# Patient Record
Sex: MALE | Race: BLACK OR AFRICAN AMERICAN | NOT HISPANIC OR LATINO | Employment: PART TIME | URBAN - METROPOLITAN AREA
[De-identification: names, ages, dates, MRNs, and addresses within clinical notes are randomized per-mention and may not be internally consistent; named-entity substitution may affect disease eponyms.]

---

## 2017-01-17 ENCOUNTER — TELEPHONE (OUTPATIENT)
Dept: FAMILY MEDICINE | Facility: CLINIC | Age: 37
End: 2017-01-17

## 2017-01-17 NOTE — TELEPHONE ENCOUNTER
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 He would like to make sure this was entered in and that his visit was billed to the workers comp and not to his health insurance. Workers comp info is not entered in on the registration. Please call patient to verify that workers comp info is in his chart.     Phone Number Patient can be reached at: Home number on file [phone] (home)    Best Time: Anytime    Can we leave a detailed message on this number? YES    Call taken on 1/17/2017 at 3:01 PM by Faby Card

## 2017-01-18 NOTE — TELEPHONE ENCOUNTER
Spoke with patient and informed of provider note below.  Patient was upset that no one has called to schedule PT.  Transferred call to scheduling, see referral.

## 2017-01-18 NOTE — TELEPHONE ENCOUNTER
To clarify, I saw patient on 12-26-16, and it was recorded as work comp.  The visit was linked to the work comp episode from 10-11-16.   We also did the usual workability letter.  Please inform patient.    Robbie Gaytan MD

## 2017-07-12 ENCOUNTER — OFFICE VISIT (OUTPATIENT)
Dept: URGENT CARE | Facility: URGENT CARE | Age: 37
End: 2017-07-12
Payer: MEDICAID

## 2017-07-12 VITALS
HEART RATE: 92 BPM | SYSTOLIC BLOOD PRESSURE: 110 MMHG | DIASTOLIC BLOOD PRESSURE: 80 MMHG | BODY MASS INDEX: 35.91 KG/M2 | TEMPERATURE: 98.4 F | WEIGHT: 205 LBS | OXYGEN SATURATION: 99 %

## 2017-07-12 DIAGNOSIS — R31.9 HEMATURIA: Primary | ICD-10-CM

## 2017-07-12 DIAGNOSIS — N41.0 ACUTE PROSTATITIS: ICD-10-CM

## 2017-07-12 DIAGNOSIS — R30.0 DYSURIA: ICD-10-CM

## 2017-07-12 LAB
ALBUMIN UR-MCNC: NEGATIVE MG/DL
APPEARANCE UR: ABNORMAL
BACTERIA #/AREA URNS HPF: ABNORMAL /HPF
BILIRUB UR QL STRIP: NEGATIVE
COLOR UR AUTO: ABNORMAL
GLUCOSE UR STRIP-MCNC: NEGATIVE MG/DL
HGB UR QL STRIP: ABNORMAL
KETONES UR STRIP-MCNC: NEGATIVE MG/DL
LEUKOCYTE ESTERASE UR QL STRIP: NEGATIVE
NITRATE UR QL: NEGATIVE
PH UR STRIP: 5 PH (ref 5–7)
RBC #/AREA URNS AUTO: >100 /HPF (ref 0–2)
SP GR UR STRIP: 1.02 (ref 1–1.03)
URN SPEC COLLECT METH UR: ABNORMAL
UROBILINOGEN UR STRIP-ACNC: 0.2 EU/DL (ref 0.2–1)
WBC #/AREA URNS AUTO: ABNORMAL /HPF (ref 0–2)

## 2017-07-12 PROCEDURE — 87086 URINE CULTURE/COLONY COUNT: CPT | Performed by: FAMILY MEDICINE

## 2017-07-12 PROCEDURE — 87591 N.GONORRHOEAE DNA AMP PROB: CPT | Performed by: FAMILY MEDICINE

## 2017-07-12 PROCEDURE — 99213 OFFICE O/P EST LOW 20 MIN: CPT | Performed by: FAMILY MEDICINE

## 2017-07-12 PROCEDURE — 81001 URINALYSIS AUTO W/SCOPE: CPT | Performed by: PHYSICIAN ASSISTANT

## 2017-07-12 PROCEDURE — 87491 CHLMYD TRACH DNA AMP PROBE: CPT | Performed by: FAMILY MEDICINE

## 2017-07-12 RX ORDER — CIPROFLOXACIN 500 MG/1
500 TABLET, FILM COATED ORAL 2 TIMES DAILY
Qty: 20 TABLET | Refills: 0 | Status: SHIPPED | OUTPATIENT
Start: 2017-07-12 | End: 2017-07-22

## 2017-07-12 NOTE — MR AVS SNAPSHOT
"              After Visit Summary   2017    Isacc Wang    MRN: [id number]           Patient Information     Date Of Birth          1980        Visit Information        Provider Department      2017 8:30 PM Nidhi Pizano MD [facility]        Today's Diagnoses     Hematuria    -  1    Dysuria        Acute prostatitis           Follow-ups after your visit        Who to contact     If you have questions or need follow up information about today's clinic visit or your schedule please contact [facility] directly at [phone].  Normal or non-critical lab and imaging results will be communicated to you by [employer], letter or phone within 4 business days after the clinic has received the results. If you do not hear from us within 7 days, please contact the clinic through [employer] or phone. If you have a critical or abnormal lab result, we will notify you by phone as soon as possible.  Submit refill requests through [facility] or call your pharmacy and they will forward the refill request to us. Please allow 3 business days for your refill to be completed.          Additional Information About Your Visit        MyChart Information     [facility] lets you send messages to your doctor, view your test results, renew your prescriptions, schedule appointments and more. To sign up, go to [email or web address] . Click on \"Log in\" on the left side of the screen, which will take you to the Welcome page. Then click on \"Sign up Now\" on the right side of the page.     You will be asked to enter the access code listed below, as well as some personal information. Please follow the directions to create your username and password.     Your access code is: [email or web address]  Expires: 10/10/2017  9:17 PM     Your access code will  in 90 days. If you need help or a new code, please call your [facility] clinic or [phone].        Care EveryWhere ID     This is your " Care EveryWhere ID. This could be used by other organizations to access your [place] medical records  [id number]        Your Vitals Were     Pulse Temperature Pulse Oximetry BMI (Body Mass Index)          92 98.4  F (36.9  C) (Oral) 99% 35.91 kg/m2         Blood Pressure from Last 3 Encounters:   07/12/17 110/80   12/26/16 130/82   12/13/16 127/83    Weight from Last 3 Encounters:   07/12/17 205 lb (93 kg)   12/26/16 204 lb (92.5 kg)   12/13/16 209 lb (94.8 kg)              We Performed the Following     Chlamydia trachomatis PCR     Neisseria gonorrhoeae PCR     UA with Microscopic reflex to Culture     Urine Culture Aerobic Bacterial          Today's Medication Changes          These changes are accurate as of: 7/12/17  9:17 PM.  If you have any questions, ask your nurse or doctor.               Start taking these medicines.        Dose/Directions    ciprofloxacin 500 MG tablet   Commonly known as:  CIPRO   Used for:  Acute prostatitis   Started by:  Nidhi Pizano MD        Dose:  500 mg   Take 1 tablet (500 mg) by mouth 2 times daily for 10 days   Quantity:  20 tablet   Refills:  0            Where to get your medicines      These medications were sent to [facility] [address] [place], [place] [address] [address] [facility] [address] [facility] [address]    Hours:  24-hours Phone:  [phone]     ciprofloxacin 500 MG tablet                Primary Care Provider Office Phone # Fax #    Ernesto Simental -[phone]       [facility] [address] [facility] [address]        Equal Access to Services     RAIZA CARDONA AH: Hadii curtis tsai hadashkandy Soomaali, waaxda luqadaha, qaybta kaalmada adeegyada, lukas karimi. So [facility] [phone].    ATENCIÓN: Si habla español, tiene a lopez disposición servicios gratuitos de asistencia lingüística. Sandra newell [phone].    We comply with applicable federal civil rights laws  and [place] laws. We do not discriminate on the basis of race, color, national origin, age, disability sex, sexual orientation or gender identity.            Thank you!     Thank you for choosing [facility]  for your care. Our goal is always to provide you with excellent care. Hearing back from our patients is one way we can continue to improve our services. Please take a few minutes to complete the written survey that you may receive in the mail after your visit with us. Thank you!             Your Updated Medication List - Protect others around you: Learn how to safely use, store and throw away your medicines at www.disposemymeds.org.          This list is accurate as of: 7/12/17  9:17 PM.  Always use your most recent med list.                   Brand Name Dispense Instructions for use Diagnosis    ciprofloxacin 500 MG tablet    CIPRO    20 tablet    Take 1 tablet (500 mg) by mouth 2 times daily for 10 days    Acute prostatitis       ibuprofen 800 MG tablet    ADVIL/MOTRIN    90 tablet    Take 1 tablet (800 mg) by mouth every 8 hours as needed for moderate pain    Chronic pain of left ankle

## 2017-07-13 NOTE — PROGRESS NOTES
SUBJECTIVE:   Isacc Wang is a 36 year old male who  presents today for a possible UTI. Symptoms of dysuria and blood in urine noticed it an hr back then had another episode in the clinic today with blood noted in the end of urination    Hematuria yes . He was at work when had an urge to urinate  and had some burning with urination and noticed some blood at the end  of urination  sudden onsetand moderate.  There is no history of fever, chills, nausea or vomiting.  Has  history of clear  penile discharge. This patient does not have a history of urinary tract infections. Patient denies rigors, flank pain and temperature > 101 degrees F.    Past Medical History:   Diagnosis Date     Esophageal reflux      Hyperlipidemia LDL goal <130 10/9/2009     Obesity 10/21/2009     Current Outpatient Prescriptions   Medication Sig Dispense Refill     ibuprofen (ADVIL/MOTRIN) 800 MG tablet Take 1 tablet (800 mg) by mouth every 8 hours as needed for moderate pain 90 tablet 1     Social History   Substance Use Topics     Smoking status: Never Smoker     Smokeless tobacco: Never Used     Alcohol use No       ROS:   Review of systems negative except as stated above.    OBJECTIVE:  /80 (BP Location: Left arm, Patient Position: Chair, Cuff Size: Adult Regular)  Pulse 92  Temp 98.4  F (36.9  C) (Oral)  Wt 205 lb (93 kg)  SpO2 99%  BMI 35.91 kg/m2  GENERAL APPEARANCE: healthy, alert and no distress  RESP: lungs clear to auscultation - no rales, rhonchi or wheezes  CV: regular rates and rhythm, normal S1 S2, no murmur noted  ABDOMEN:  soft, nontender, no HSM or masses and bowel sounds normal  BACK: No CVA tenderness  SKIN: no suspicious lesions or rashes    ASSESSMENT:   Isacc was seen today for urinary problem.    Diagnoses and all orders for this visit:    Hematuria  -     UA with Microscopic reflex to Culture    Dysuria  -     Chlamydia trachomatis PCR  -     Neisseria gonorrhoeae PCR  -     Urine Culture Aerobic  Bacterial    Acute prostatitis  -     ciprofloxacin (CIPRO) 500 MG tablet; Take 1 tablet (500 mg) by mouth 2 times daily for 10 days      Results for orders placed or performed in visit on 07/12/17   UA with Microscopic reflex to Culture   Result Value Ref Range    Color Urine Pink     Appearance Urine Slightly Cloudy     Glucose Urine Negative NEG mg/dL    Bilirubin Urine Negative NEG    Ketones Urine Negative NEG mg/dL    Specific Gravity Urine 1.020 1.003 - 1.035    pH Urine 5.0 5.0 - 7.0 pH    Protein Albumin Urine Negative NEG mg/dL    Urobilinogen Urine 0.2 0.2 - 1.0 EU/dL    Nitrite Urine Negative NEG    Blood Urine Large (A) NEG    Leukocyte Esterase Urine Negative NEG    Source Midstream Urine     WBC Urine O - 2 0 - 2 /HPF    RBC Urine >100 (A) 0 - 2 /HPF    Bacteria Urine Few (A) NEG /HPF         PLAN:  As per ordered above  Drink plenty of fluids.  Prevention and treatment of UTI's discussed.Signs and symptoms of pyelonephritis mentioned.  Follow up with primary care physician if not improving  Follow up if  symptoms fail to improve or worsens   Pt understood and agreed with plan

## 2017-07-13 NOTE — NURSING NOTE
"Chief Complaint   Patient presents with     Urinary Problem     had episode of hematuria tonight       Initial /80 (BP Location: Left arm, Patient Position: Chair, Cuff Size: Adult Regular)  Pulse 92  Temp 98.4  F (36.9  C) (Oral)  Wt 205 lb (93 kg)  SpO2 99%  BMI 35.91 kg/m2 Estimated body mass index is 35.91 kg/(m^2) as calculated from the following:    Height as of 12/26/16: 5' 3.35\" (1.609 m).    Weight as of this encounter: 205 lb (93 kg).  Medication Reconciliation: complete   Michoacano PETERS    "

## 2017-07-14 LAB
BACTERIA SPEC CULT: NORMAL
C TRACH DNA SPEC QL NAA+PROBE: NORMAL
MICRO REPORT STATUS: NORMAL
N GONORRHOEA DNA SPEC QL NAA+PROBE: NORMAL
SPECIMEN SOURCE: NORMAL

## 2017-09-29 ENCOUNTER — OFFICE VISIT (OUTPATIENT)
Dept: FAMILY MEDICINE | Facility: CLINIC | Age: 37
End: 2017-09-29
Payer: COMMERCIAL

## 2017-09-29 VITALS
TEMPERATURE: 97.3 F | WEIGHT: 210 LBS | HEIGHT: 65 IN | SYSTOLIC BLOOD PRESSURE: 121 MMHG | HEART RATE: 73 BPM | BODY MASS INDEX: 34.99 KG/M2 | OXYGEN SATURATION: 99 % | DIASTOLIC BLOOD PRESSURE: 90 MMHG

## 2017-09-29 DIAGNOSIS — Z80.0 FAMILY HISTORY OF COLON CANCER: ICD-10-CM

## 2017-09-29 DIAGNOSIS — Z12.11 SPECIAL SCREENING FOR MALIGNANT NEOPLASMS, COLON: Primary | ICD-10-CM

## 2017-09-29 DIAGNOSIS — R07.89 CHEST TIGHTNESS OR PRESSURE: ICD-10-CM

## 2017-09-29 DIAGNOSIS — N52.9 ERECTILE DYSFUNCTION, UNSPECIFIED ERECTILE DYSFUNCTION TYPE: ICD-10-CM

## 2017-09-29 DIAGNOSIS — Z13.1 SCREENING FOR DIABETES MELLITUS: ICD-10-CM

## 2017-09-29 DIAGNOSIS — Z80.42 FAMILY HISTORY OF PROSTATE CANCER: ICD-10-CM

## 2017-09-29 LAB
GLUCOSE SERPL-MCNC: 92 MG/DL (ref 70–99)
PSA SERPL-ACNC: 1.2 UG/L (ref 0–4)

## 2017-09-29 PROCEDURE — 82947 ASSAY GLUCOSE BLOOD QUANT: CPT | Performed by: FAMILY MEDICINE

## 2017-09-29 PROCEDURE — 93000 ELECTROCARDIOGRAM COMPLETE: CPT | Performed by: FAMILY MEDICINE

## 2017-09-29 PROCEDURE — 99395 PREV VISIT EST AGE 18-39: CPT | Performed by: FAMILY MEDICINE

## 2017-09-29 PROCEDURE — 36415 COLL VENOUS BLD VENIPUNCTURE: CPT | Performed by: FAMILY MEDICINE

## 2017-09-29 PROCEDURE — G0103 PSA SCREENING: HCPCS | Performed by: FAMILY MEDICINE

## 2017-09-29 RX ORDER — SILDENAFIL CITRATE 20 MG/1
TABLET ORAL
Qty: 20 TABLET | Refills: 0 | Status: SHIPPED | OUTPATIENT
Start: 2017-09-29 | End: 2017-09-29

## 2017-09-29 RX ORDER — SILDENAFIL CITRATE 20 MG/1
TABLET ORAL
Qty: 20 TABLET | Refills: 0 | Status: SHIPPED | OUTPATIENT
Start: 2017-09-29 | End: 2017-10-25

## 2017-09-29 NOTE — LETTER
[facility]   [address] [place]  [address]  [phone]                                   October 2, 2017    Isacc Wang  [address]  [facility] [address]        Dear Isacc,    Your psa and glucose were normal     Results for orders placed or performed in visit on 09/29/17   PSA, screen   Result Value Ref Range    PSA 1.20 0 - 4 ug/L   Glucose   Result Value Ref Range    Glucose 92 70 - 99 mg/dL       If you have any questions please call the clinic at [phone]    Sincerely,    Ernesto Simental MD  bmd

## 2017-09-29 NOTE — PROGRESS NOTES
SUBJECTIVE:   CC: Isacc Wang is an 37 year old male who presents for preventative health visit.     Physical   Annual:     Getting at least 3 servings of Calcium per day::  Yes    Bi-annual eye exam::  Yes    Dental care twice a year::  Yes    Sleep apnea or symptoms of sleep apnea::  None    Taking medications regularly::  Yes    Medication side effects::  Not applicable      Today's PHQ-2 Score:   PHQ-2 ( 1999 [employer]) 9/29/2017   Q1: Little interest or pleasure in doing things 0   Q2: Feeling down, depressed or hopeless 0   PHQ-2 Score 0   Q1: Little interest or pleasure in doing things Not at all   Q2: Feeling down, depressed or hopeless Not at all   PHQ-2 Score 0     Work construction   Detailing cars     Abuse: Current or Past(Physical, Sexual or Emotional)- No  Do you feel safe in your environment - Yes    Social History   Substance Use Topics     Smoking status: Never Smoker     Smokeless tobacco: Never Used     Alcohol use No     The patient does not drink >3 drinks per day nor >7 drinks per week.    Last PSA: No results found for: PSA    Reviewed orders with patient. Reviewed health maintenance and updated orders accordingly - Yes  BP Readings from Last 3 Encounters:   09/29/17 121/90   07/12/17 110/80   12/26/16 130/82    Wt Readings from Last 3 Encounters:   09/29/17 210 lb (95.3 kg)   07/12/17 205 lb (93 kg)   12/26/16 204 lb (92.5 kg)                  Patient Active Problem List   Diagnosis     Hyperlipidemia LDL goal <130     Obesity     Acute idiopathic gout of right foot     Family history of colon cancer     Past Surgical History:   Procedure Laterality Date     C NONSPECIFIC PROCEDURE      cyst removed right ear       Social History   Substance Use Topics     Smoking status: Never Smoker     Smokeless tobacco: Never Used     Alcohol use Yes      Comment: Socially     Family History   Problem Relation Age of Onset     Hypertension Mother      CANCER Mother      cervical cancer     Depression  "Mother      Thyroid Disease Mother      DIABETES Father      Hypertension Father      CEREBROVASCULAR DISEASE Father      Depression Father      HEART DISEASE Father      Lipids Father      Colon Cancer Father      Colon Cancer Maternal Grandmother      Colon Cancer Maternal Uncle          Current Outpatient Prescriptions   Medication Sig Dispense Refill     Naproxen Sodium (ALEVE PO) Take by mouth as needed for moderate pain       ibuprofen (ADVIL/MOTRIN) 800 MG tablet Take 1 tablet (800 mg) by mouth every 8 hours as needed for moderate pain (Patient not taking: Reported on 9/29/2017) 90 tablet 1     No Known Allergies        Reviewed and updated as needed this visit by clinical staff         Reviewed and updated as needed this visit by Provider            ROS:I: NEGATIVE for worrisome rashes, moles or lesions  C: NEGATIVE for fever, chills, change in weight    E: NEGATIVE for vision changes or irritation  ENT: NEGATIVE for ear, mouth and throat problems  R: NEGATIVE for significant cough or SOB  CV: NEGATIVE , palpitations or peripheral edema' ; patient states that something is sitting on his chest goes away in 5-10 minutes   He did not think it was that bad and he thinks he was overexerting himself   GI: NEGATIVE for nausea, abdominal pain, heartburn, or change in bowel habits   male: negative for dysuria, hematuria, decreased urinary stream, erectile dysfunction, urethral discharge  History of gross hematuria 2 months ago   He was given antibiotic   Went away   M: NEGATIVE for significant arthralgias or myalgia  N: NEGATIVE for weakness, dizziness or paresthesias  P: NEGATIVE for changes in mood or affect    OBJECTIVE:   /90  Pulse 73  Temp 97.3  F (36.3  C) (Oral)  Ht 5' 4.5\" (1.638 m)  Wt 210 lb (95.3 kg)  SpO2 99%  BMI 35.49 kg/m2    EXAM:  GENERAL: healthy, alert and no distress; overweight EYES: Eyes grossly normal to inspection, PERRL and conjunctivae and sclerae normal  HENT: ear canals and " "TM's normal, nose and mouth without ulcers or lesions  NECK: no adenopathy, no asymmetry, masses, or scars and thyroid normal to palpation  RESP: lungs clear to auscultation - no rales, rhonchi or wheezes  CV: regular rate and rhythm, normal S1 S2, no S3 or S4, no murmur, click or rub, no peripheral edema and peripheral pulses strong  ABDOMEN: soft, nontender, no hepatosplenomegaly, no masses and bowel sounds normal  MS: no gross musculoskeletal defects noted, no edema  SKIN: no suspicious lesions or rashes  NEURO: Normal strength and tone, mentation intact and speech normal  PSYCH: mentation appears normal, affect normal/bright    Rectal is normal prostate     ASSESSMENT/PLAN:       ICD-10-CM    1. Special screening for malignant neoplasms, colon Z12.11 GASTROENTEROLOGY ADULT REF PROCEDURE ONLY   2. Family history of colon cancer Z80.0 GASTROENTEROLOGY ADULT REF PROCEDURE ONLY   3. Family history of prostate cancer Z80.42 PSA, screen   4. Chest tightness or pressure R07.89 EKG 12-lead complete w/read - Clinics       COUNSELING:   Reviewed preventive health counseling, as reflected in patient instructions       Regular exercise       Healthy diet/nutrition           reports that he has never smoked. He has never used smokeless tobacco.      Estimated body mass index is 35.91 kg/(m^2) as calculated from the following:    Height as of 12/26/16: 5' 3.35\" (1.609 m).    Weight as of 7/12/17: 205 lb (93 kg).         Counseling Resources:  ATP IV Guidelines  Pooled Cohorts Equation Calculator  FRAX Risk Assessment  ICSI Preventive Guidelines  Dietary Guidelines for Americans, 2010  USDA's MyPlate  ASA Prophylaxis  Lung CA Screening    Ernesto Simental MD  [facility] for HPI/ROS submitted by the patient on 9/29/2017   PHQ-2 Score: 0    "

## 2017-09-29 NOTE — MR AVS SNAPSHOT
After Visit Summary   9/29/2017    Isacc Wang    MRN: [id number]           Patient Information     Date Of Birth          1980        Visit Information        Provider Department      9/29/2017 2:20 PM Ernesto Simental MD [facility]        Today's Diagnoses     Special screening for malignant neoplasms, colon    -  1    Family history of colon cancer        Family history of prostate cancer        Chest tightness or pressure        Erectile dysfunction, unspecified erectile dysfunction type          Care Instructions      Preventive Health Recommendations  Male Ages 26 - 39    Yearly exam:             See your health care provider every year in order to  o   Review health changes.   o   Discuss preventive care.    o   Review your medicines if your doctor has prescribed any.    You should be tested each year for STDs (sexually transmitted diseases), if you re at risk.     After age 35, talk to your provider about cholesterol testing. If you are at risk for heart disease, have your cholesterol tested at least every 5 years.     If you are at risk for diabetes, you should have a diabetes test (fasting glucose).  Shots: Get a flu shot each year. Get a tetanus shot every 10 years.     Nutrition:    Eat at least 5 servings of fruits and vegetables daily.     Eat whole-grain bread, whole-wheat pasta and brown rice instead of white grains and rice.     Talk to your provider about Calcium and Vitamin D.     Lifestyle    Exercise for at least 150 minutes a week (30 minutes a day, 5 days a week). This will help you control your weight and prevent disease.     Limit alcohol to one drink per day.     No smoking.     Wear sunscreen to prevent skin cancer.     See your dentist every six months for an exam and cleaning.             Follow-ups after your visit        Additional Services     GASTROENTEROLOGY ADULT REF PROCEDURE ONLY       Last Lab Result: No results found for:  "CR  Body mass index is 35.49 kg/(m^2).    Father diagnosed with colon cancer when 45 y/o      Patient will be contacted to schedule procedure.     Please be aware that coverage of these services is subject to the terms and limitations of your health insurance plan.  Call member services at your health plan with any benefit or coverage questions.  Any procedures must be performed at a [facility] facility OR coordinated by your clinic's referral office.    Please bring the following with you to your appointment:    (1) Any X-Rays, CTs or MRIs which have been performed.  Contact the facility where they were done to arrange for  prior to your scheduled appointment.    (2) List of current medications   (3) This referral request   (4) Any documents/labs given to you for this referral                  Who to contact     If you have questions or need follow up information about today's clinic visit or your schedule please contact [facility] directly at [phone].  Normal or non-critical lab and imaging results will be communicated to you by MyChart, letter or phone within 4 business days after the clinic has received the results. If you do not hear from us within 7 days, please contact the clinic through [employer] or phone. If you have a critical or abnormal lab result, we will notify you by phone as soon as possible.  Submit refill requests through [employer] or call your pharmacy and they will forward the refill request to us. Please allow 3 business days for your refill to be completed.          Additional Information About Your Visit        [employer] Information     [employer] lets you send messages to your doctor, view your test results, renew your prescriptions, schedule appointments and more. To sign up, go to [email or web address] . Click on \"Log in\" on the left side of the screen, which will take you to the Welcome page. Then click on \"Sign up Now\" on the right side of the page.     You will " "be asked to enter the access code listed below, as well as some personal information. Please follow the directions to create your username and password.     Your access code is: [email or web address]  Expires: 10/10/2017  9:17 PM     Your access code will  in 90 days. If you need help or a new code, please call your [facility] clinic or [phone].        Care EveryWhere ID     This is your Care EveryWhere ID. This could be used by other organizations to access your [facility] medical records  [id number]        Your Vitals Were     Pulse Temperature Height Pulse Oximetry BMI (Body Mass Index)       73 97.3  F (36.3  C) (Oral) 5' 4.5\" (1.638 m) 99% 35.49 kg/m2        Blood Pressure from Last 3 Encounters:   17 121/90   17 110/80   16 130/82    Weight from Last 3 Encounters:   17 210 lb (95.3 kg)   17 205 lb (93 kg)   16 204 lb (92.5 kg)              We Performed the Following     EKG 12-lead complete w/read - Clinics     GASTROENTEROLOGY ADULT REF PROCEDURE ONLY     PSA, screen          Today's Medication Changes          These changes are accurate as of: 17  3:44 PM.  If you have any questions, ask your nurse or doctor.               Start taking these medicines.        Dose/Directions    sildenafil 20 MG tablet   Commonly known as:  REVATIO   Used for:  Erectile dysfunction, unspecified erectile dysfunction type   Started by:  Ernesto Simental MD        Take 1-3 tabs as needed 1 hour before sexual activity for erectile dysfunction  Never use with nitroglycerin, terazosin or doxazosin.   Quantity:  20 tablet   Refills:  0            Where to get your medicines      Call your pharmacy to confirm that your medication is ready for pickup. It may take up to 24 hours for them to receive the prescription. If the prescription is not ready within 3 business days, please contact your clinic or your provider.     We will let you know when these medications are ready. If you don't hear " back within 3 business days, please contact us.     sildenafil 20 MG tablet                Primary Care Provider Office Phone # Fax #    Ernesto Simental [phone]       [address] [facility] [address]        Equal Access to Services     KYLIERAIZA DULCE : Hadii aad ku hadasho Soomaali, waaxda luqadaha, qaybta kaalmada adeegyada, waxay idiin hayefrainn adeeg khjennifer lashakajosue karimi. So [facility] [phone].    ATENCIÓN: Si habla español, tiene a lopez disposición servicios gratuitos de asistencia lingüística. Llame al [phone].    We comply with applicable federal civil rights laws and [place] laws. We do not discriminate on the basis of race, color, national origin, age, disability, sex, sexual orientation, or gender identity.            Thank you!     Thank you for choosing [facility]  for your care. Our goal is always to provide you with excellent care. Hearing back from our patients is one way we can continue to improve our services. Please take a few minutes to complete the written survey that you may receive in the mail after your visit with us. Thank you!             Your Updated Medication List - Protect others around you: Learn how to safely use, store and throw away your medicines at www.disposemymeds.org.          This list is accurate as of: 9/29/17  3:44 PM.  Always use your most recent med list.                   Brand Name Dispense Instructions for use Diagnosis    ALEVE PO      Take by mouth as needed for moderate pain        ibuprofen 800 MG tablet    ADVIL/MOTRIN    90 tablet    Take 1 tablet (800 mg) by mouth every 8 hours as needed for moderate pain    Chronic pain of left ankle       sildenafil 20 MG tablet    REVATIO    20 tablet    Take 1-3 tabs as needed 1 hour before sexual activity for erectile dysfunction  Never use with nitroglycerin, terazosin or doxazosin.    Erectile dysfunction, unspecified erectile dysfunction type

## 2017-10-09 ENCOUNTER — TELEPHONE (OUTPATIENT)
Dept: FAMILY MEDICINE | Facility: CLINIC | Age: 37
End: 2017-10-09

## 2017-10-09 NOTE — TELEPHONE ENCOUNTER
Reason for Call:  Other colonoscsopy prep script     Detailed comments: Patient calling in to state that his pharmacy, [facility], that they have not received the script for his colonoscopy prep.    Phone Number Patient can be reached at: Cell number on file:    Telephone Information:   Mobile [phone]       Best Time: anytime    Can we leave a detailed message on this number? YES    Call taken on 10/9/2017 at 3:57 PM by Angelika Sierra

## 2017-10-09 NOTE — TELEPHONE ENCOUNTER
Notified patient to get this prep from surgeon.  Patient stated understanding and agreeable with the plan of care. Talia Shay RN [facility] Triage.

## 2017-10-25 DIAGNOSIS — N52.9 ERECTILE DYSFUNCTION, UNSPECIFIED ERECTILE DYSFUNCTION TYPE: ICD-10-CM

## 2017-10-26 ENCOUNTER — HOSPITAL ENCOUNTER (OUTPATIENT)
Facility: AMBULATORY SURGERY CENTER | Age: 37
Discharge: HOME | End: 2017-10-26
Attending: SURGERY | Admitting: SURGERY
Payer: COMMERCIAL

## 2017-10-26 ENCOUNTER — SURGERY (OUTPATIENT)
Age: 37
End: 2017-10-26

## 2017-10-26 VITALS
WEIGHT: 210 LBS | OXYGEN SATURATION: 97 % | BODY MASS INDEX: 34.99 KG/M2 | TEMPERATURE: 97.5 F | RESPIRATION RATE: 16 BRPM | DIASTOLIC BLOOD PRESSURE: 81 MMHG | HEIGHT: 65 IN | SYSTOLIC BLOOD PRESSURE: 119 MMHG

## 2017-10-26 LAB — COLONOSCOPY: NORMAL

## 2017-10-26 PROCEDURE — G8918 PT W/O PREOP ORDER IV AB PRO: HCPCS

## 2017-10-26 PROCEDURE — 45385 COLONOSCOPY W/LESION REMOVAL: CPT

## 2017-10-26 PROCEDURE — 88305 TISSUE EXAM BY PATHOLOGIST: CPT | Performed by: SURGERY

## 2017-10-26 PROCEDURE — 99152 MOD SED SAME PHYS/QHP 5/>YRS: CPT | Performed by: SURGERY

## 2017-10-26 PROCEDURE — G8907 PT DOC NO EVENTS ON DISCHARG: HCPCS

## 2017-10-26 PROCEDURE — 45385 COLONOSCOPY W/LESION REMOVAL: CPT | Mod: PT | Performed by: SURGERY

## 2017-10-26 RX ORDER — FENTANYL CITRATE 50 UG/ML
INJECTION, SOLUTION INTRAMUSCULAR; INTRAVENOUS PRN
Status: DISCONTINUED | OUTPATIENT
Start: 2017-10-26 | End: 2017-10-26 | Stop reason: HOSPADM

## 2017-10-26 RX ORDER — LIDOCAINE 40 MG/G
CREAM TOPICAL
Status: DISCONTINUED | OUTPATIENT
Start: 2017-10-26 | End: 2017-10-27 | Stop reason: HOSPADM

## 2017-10-26 RX ADMIN — FENTANYL CITRATE 50 MCG: 50 INJECTION, SOLUTION INTRAMUSCULAR; INTRAVENOUS at 08:33

## 2017-10-26 RX ADMIN — FENTANYL CITRATE 100 MCG: 50 INJECTION, SOLUTION INTRAMUSCULAR; INTRAVENOUS at 08:30

## 2017-10-27 RX ORDER — SILDENAFIL CITRATE 20 MG/1
TABLET ORAL
Qty: 20 TABLET | Refills: 0 | Status: SHIPPED | OUTPATIENT
Start: 2017-10-27 | End: 2020-12-09

## 2017-10-27 NOTE — TELEPHONE ENCOUNTER
Prescription approved per [facility] Refill Protocol.    Hien Oconnell RN  [facility]

## 2017-11-02 LAB — COPATH REPORT: NORMAL

## 2019-02-17 ENCOUNTER — NURSE TRIAGE (OUTPATIENT)
Dept: NURSING | Facility: CLINIC | Age: 39
End: 2019-02-17

## 2019-02-17 ENCOUNTER — APPOINTMENT (OUTPATIENT)
Dept: GENERAL RADIOLOGY | Facility: CLINIC | Age: 39
End: 2019-02-17
Attending: EMERGENCY MEDICINE
Payer: COMMERCIAL

## 2019-02-17 ENCOUNTER — HOSPITAL ENCOUNTER (EMERGENCY)
Facility: CLINIC | Age: 39
Discharge: HOME | End: 2019-02-17
Attending: EMERGENCY MEDICINE | Admitting: EMERGENCY MEDICINE
Payer: COMMERCIAL

## 2019-02-17 VITALS
HEART RATE: 82 BPM | TEMPERATURE: 97.8 F | BODY MASS INDEX: 35.36 KG/M2 | HEIGHT: 66 IN | RESPIRATION RATE: 20 BRPM | SYSTOLIC BLOOD PRESSURE: 149 MMHG | DIASTOLIC BLOOD PRESSURE: 103 MMHG | OXYGEN SATURATION: 99 % | WEIGHT: 220 LBS

## 2019-02-17 DIAGNOSIS — M54.9 ACUTE BACK PAIN, UNSPECIFIED BACK LOCATION, UNSPECIFIED BACK PAIN LATERALITY: ICD-10-CM

## 2019-02-17 DIAGNOSIS — R07.89 ATYPICAL CHEST PAIN: ICD-10-CM

## 2019-02-17 LAB
ALBUMIN SERPL-MCNC: 3.4 G/DL (ref 3.4–5)
ALP SERPL-CCNC: 58 U/L (ref 40–150)
ALT SERPL W P-5'-P-CCNC: 59 U/L (ref 0–70)
ANION GAP SERPL CALCULATED.3IONS-SCNC: 5 MMOL/L (ref 3–14)
AST SERPL W P-5'-P-CCNC: 29 U/L (ref 0–45)
BASOPHILS # BLD AUTO: 0 10E9/L (ref 0–0.2)
BASOPHILS NFR BLD AUTO: 0.5 %
BILIRUB SERPL-MCNC: <0.1 MG/DL (ref 0.2–1.3)
BUN SERPL-MCNC: 15 MG/DL (ref 7–30)
CALCIUM SERPL-MCNC: 8.4 MG/DL (ref 8.5–10.1)
CHLORIDE SERPL-SCNC: 112 MMOL/L (ref 94–109)
CO2 SERPL-SCNC: 24 MMOL/L (ref 20–32)
CREAT SERPL-MCNC: 0.71 MG/DL (ref 0.66–1.25)
DIFFERENTIAL METHOD BLD: NORMAL
EOSINOPHIL # BLD AUTO: 0.1 10E9/L (ref 0–0.7)
EOSINOPHIL NFR BLD AUTO: 1.3 %
ERYTHROCYTE [DISTWIDTH] IN BLOOD BY AUTOMATED COUNT: 14.3 % (ref 10–15)
GFR SERPL CREATININE-BSD FRML MDRD: >90 ML/MIN/{1.73_M2}
GLUCOSE SERPL-MCNC: 87 MG/DL (ref 70–99)
HCT VFR BLD AUTO: 41 % (ref 40–53)
HGB BLD-MCNC: 13.5 G/DL (ref 13.3–17.7)
IMM GRANULOCYTES # BLD: 0 10E9/L (ref 0–0.4)
IMM GRANULOCYTES NFR BLD: 0.5 %
LYMPHOCYTES # BLD AUTO: 2.7 10E9/L (ref 0.8–5.3)
LYMPHOCYTES NFR BLD AUTO: 31.6 %
MCH RBC QN AUTO: 26.8 PG (ref 26.5–33)
MCHC RBC AUTO-ENTMCNC: 32.9 G/DL (ref 31.5–36.5)
MCV RBC AUTO: 81 FL (ref 78–100)
MONOCYTES # BLD AUTO: 0.9 10E9/L (ref 0–1.3)
MONOCYTES NFR BLD AUTO: 10.3 %
NEUTROPHILS # BLD AUTO: 4.8 10E9/L (ref 1.6–8.3)
NEUTROPHILS NFR BLD AUTO: 55.8 %
NRBC # BLD AUTO: 0 10*3/UL
NRBC BLD AUTO-RTO: 0 /100
PLATELET # BLD AUTO: 246 10E9/L (ref 150–450)
POTASSIUM SERPL-SCNC: 4 MMOL/L (ref 3.4–5.3)
PROT SERPL-MCNC: 7.7 G/DL (ref 6.8–8.8)
RBC # BLD AUTO: 5.04 10E12/L (ref 4.4–5.9)
SODIUM SERPL-SCNC: 141 MMOL/L (ref 133–144)
TROPONIN I SERPL-MCNC: <0.015 UG/L (ref 0–0.04)
WBC # BLD AUTO: 8.6 10E9/L (ref 4–11)

## 2019-02-17 PROCEDURE — 25000128 H RX IP 250 OP 636: Performed by: EMERGENCY MEDICINE

## 2019-02-17 PROCEDURE — 85025 COMPLETE CBC W/AUTO DIFF WBC: CPT | Performed by: EMERGENCY MEDICINE

## 2019-02-17 PROCEDURE — 99285 EMERGENCY DEPT VISIT HI MDM: CPT | Mod: 25

## 2019-02-17 PROCEDURE — 84484 ASSAY OF TROPONIN QUANT: CPT | Performed by: EMERGENCY MEDICINE

## 2019-02-17 PROCEDURE — 93005 ELECTROCARDIOGRAM TRACING: CPT

## 2019-02-17 PROCEDURE — 80053 COMPREHEN METABOLIC PANEL: CPT | Performed by: EMERGENCY MEDICINE

## 2019-02-17 PROCEDURE — 96374 THER/PROPH/DIAG INJ IV PUSH: CPT

## 2019-02-17 PROCEDURE — 25000132 ZZH RX MED GY IP 250 OP 250 PS 637: Performed by: EMERGENCY MEDICINE

## 2019-02-17 PROCEDURE — 71046 X-RAY EXAM CHEST 2 VIEWS: CPT

## 2019-02-17 RX ORDER — ASPIRIN 81 MG/1
324 TABLET, CHEWABLE ORAL ONCE
Status: COMPLETED | OUTPATIENT
Start: 2019-02-17 | End: 2019-02-17

## 2019-02-17 RX ORDER — KETOROLAC TROMETHAMINE 15 MG/ML
10 INJECTION, SOLUTION INTRAMUSCULAR; INTRAVENOUS ONCE
Status: COMPLETED | OUTPATIENT
Start: 2019-02-17 | End: 2019-02-17

## 2019-02-17 RX ORDER — CYCLOBENZAPRINE HCL 10 MG
10 TABLET ORAL 3 TIMES DAILY PRN
Qty: 20 TABLET | Refills: 0 | Status: SHIPPED | OUTPATIENT
Start: 2019-02-17 | End: 2019-05-21

## 2019-02-17 RX ORDER — DIAZEPAM 5 MG
5 TABLET ORAL ONCE
Status: COMPLETED | OUTPATIENT
Start: 2019-02-17 | End: 2019-02-17

## 2019-02-17 RX ADMIN — ASPIRIN 81 MG 324 MG: 81 TABLET ORAL at 22:47

## 2019-02-17 RX ADMIN — DIAZEPAM 5 MG: 5 TABLET ORAL at 22:46

## 2019-02-17 RX ADMIN — KETOROLAC TROMETHAMINE 10 MG: 15 INJECTION, SOLUTION INTRAMUSCULAR; INTRAVENOUS at 22:47

## 2019-02-17 ASSESSMENT — ENCOUNTER SYMPTOMS
SHORTNESS OF BREATH: 1
BACK PAIN: 1

## 2019-02-17 ASSESSMENT — MIFFLIN-ST. JEOR: SCORE: 1850.66

## 2019-02-17 NOTE — ED AVS SNAPSHOT
[facility]  [address]  [facility] [address]  Phone:  [phone]  Fax:  [phone]                                    Isacc Wang   MRN: [id number]    Department:  [facility]   Date of Visit:  2/17/2019           After Visit Summary Signature Page    I have received my discharge instructions, and my questions have been answered. I have discussed any challenges I see with this plan with the nurse or doctor.    ..........................................................................................................................................  Patient/Patient Representative Signature      ..........................................................................................................................................  Patient Representative Print Name and Relationship to Patient    ..................................................               ................................................  Date                                   Time    ..........................................................................................................................................  Reviewed by Signature/Title    ...................................................              ..............................................  Date                                               Time          22EPIC Rev 08/18

## 2019-02-18 LAB — INTERPRETATION ECG - MUSE: NORMAL

## 2019-02-18 NOTE — TELEPHONE ENCOUNTER
Patient concerned about chest pain.    Patient states she started having chest pain yesterday and it is worse today. Pain is about a 7/10 on pain scale. Pain is in the left side of her chest and travels from the front into his back and the pain feels heavy today on his chest, lasts longer than 5 minutes. Denied severe difficulty with breathing, shortness of breath at times.     Protocol and care advice reviewed.  Recommended patient hang up and call 911 per protocol, patient agreed he would call 911.   Caller states understanding of the recommended disposition.   Advised to call back if further questions or concerns.    Priscilla Duffy RN  [facility] Nurse Advisor    Reason for Disposition    [1] Chest pain lasts > 5 minutes AND [2] described as crushing, pressure-like, or heavy    Additional Information    Negative: Severe difficulty breathing (e.g., struggling for each breath, speaks in single words)    Negative: Difficult to awaken or acting confused (e.g., disoriented, slurred speech)    Negative: Shock suspected (e.g., cold/pale/clammy skin, too weak to stand, low BP, rapid pulse)    Negative: [1] Chest pain lasts > 5 minutes AND [2] history of heart disease  (i.e., heart attack, bypass surgery, angina, angioplasty, CHF; not just a heart murmur)    Protocols used: CHEST PAIN-ADULT-

## 2019-02-18 NOTE — ED TRIAGE NOTES
Pt presents with having chest pain that radiates from between his shoulder blades into the front of the chest rates it at a 6/10 ABC's intact A&Ox.4

## 2019-02-18 NOTE — ED PROVIDER NOTES
History     Chief Complaint:  Chest pain    The history is provided by the patient.      Isacc Wang is a 38 year old male with a history of hyperlipidemia who presents to the emergency department with his friend for evaluation of chest pain. For the past two days, the patient has had left sided back pain originating behind the shoulder blade and this pain wrapped to the front of his left side of his chest, which was concerning to him. Pain is worse in his back vs the chest and pain is sharp, non-pleuritic, brief, and sporadic in occurrence. He tried to massage his back but then the pain became sharp in the left side of the chest and did become short of breath. He does note he [place] bags at the airport [place] and may have injured his back then. The continuation of pain this evening prompted the patient to seek evaluation here in the emergency department. He has taken 400 mg ibuprofen 8 hours ago for the pain. He denies a history of related back pain but does have a history of back pain. He is not a smoker and has never previously had a stress test.     Cardiac/PE/DVT Risk Factors:  The patient has a history of hyperlipidemia but no hypertension, diabetes, or smoking. He reports a family history of heart disease in his father as well as HTN, hyperlipidemia, and diabetes. The patient denies any personal or familial history of PE, DVT, or clotting disorder. The patient reports denies recent travel, surgery, or other immobilizations.     Allergies:  NKDA     Medications:    Revatio  Prilosec    Past Medical History:    Hyperlipidemia  Obesity  Acute idiopathic gout of right foot   GERD    Past Surgical History:    Right ear cyst removed  Colonoscopy with CO2 insufflation    Family History:    HTN  Cervical cancer  Depression  Thyroid disease  diabetes mellitus  Cerebrovascular disease  Colon cancer  Hyperlipidemia  Heart disease: Father    Social History:  Negative for tobacco use.  Positive for  "alcohol use.  Negative for drug use.  Marital Status:  Single      Review of Systems   Respiratory: Positive for shortness of breath.    Cardiovascular: Positive for chest pain.   Musculoskeletal: Positive for back pain.   All other systems reviewed and are negative.    Physical Exam     Patient Vitals for the past 24 hrs:   BP Temp Temp src Pulse Heart Rate Resp SpO2 Height Weight   02/17/19 2230 -- -- -- -- 80 20 99 % -- --   02/17/19 2220 -- -- -- -- 77 22 100 % -- --   02/17/19 2215 -- -- -- -- 76 27 98 % -- --   02/17/19 2211 (!) 149/103 97.8  F (36.6  C) Oral 82 82 18 98 % 1.676 m (5' 6\") 99.8 kg (220 lb)       Physical Exam  Constitutional:  Oriented to person, place, and time. Well appearing.  HENT:   Head:    Normocephalic.   Mouth/Throat:   Oropharynx is clear and moist.   Eyes:    EOM are normal. Pupils are equal, round, and reactive to light.   Neck:    Neck supple.   Cardiovascular:  Normal rate, regular rhythm and normal heart sounds.      Exam reveals no gallop and no friction rub.       No murmur heard.  Pulmonary/Chest:  Effort normal and breath sounds normal.      No respiratory distress. No wheezes. No rales.      No reproducible chest wall pain.  Abdominal:   Soft. No distension. No tenderness. No rebound and no guarding.   Musculoskeletal:  Normal range of motion. No midline spinal tenderness.  Left thoracic paraspinal tenderness noted. Pain is reproducible with lateral torsion of his trunk. 2+ distal pulses.    Neurological:   Alert and oriented to person, place, and time.           Moves all 4 extremities spontaneously. 5/5 strength in all 4 extremities.  Sensation intact to light touch in all 4 extremities.  Skin:    No rash noted. No pallor.     Emergency Department Course   ECG:  Indication: chest pain  Time: 2204  Vent. Rate 77 bpm. AL interval 164. QRS duration 94. QT/QTc 370/418. P-R-T axis 37 -13 -2. Normal sinus rhythm Voltage criteria for left ventricular hypertrophy. Abnormal ECG. " Agrees with computer interpretation. Read time: 2210.    Imaging:  Radiographic findings were communicated with the patient who voiced understanding of the findings.    XR Chest 2 views:   No acute abnormality. As per radiology.     Laboratory:  2220 Troponin: <0.015  CBC: WBC: 8.6, HGB: 13.5, PLT: 246  CMP: Chloride 112 (H), Calcium 8.4 (L), Bilirubin total <0.1 (L) o/w WNL (Creatinine: 0.71)    Interventions:  2246 Valium 5 mg tablet PO  2247 Aspirin 324 mg tablet PO  2247 Toradol 10 mg IV    Emergency Department Course:  2208 Nursing notes and vitals reviewed.  I performed an exam of the patient as documented above.     IV inserted. Medicine administered as documented above. Blood drawn. This was sent to the lab for further testing, results above.    The patient was placed on continuous pulse oximetry and cardiac monitoring while here in the ED.      EKG obtained in the ED, see results above.     The patient was sent for a chest xray while in the emergency department, findings above.     2330 I rechecked the patient and discussed the results of his workup thus far.     Findings and plan explained to the Patient. Patient discharged home with instructions regarding supportive care, medications, and reasons to return. The importance of close follow-up was reviewed. The patient was prescribed Flexeril.    I personally reviewed the laboratory results with the Patient and answered all related questions prior to discharge.   Impression & Plan    Medical Decision Making:  Isacc Wang is a 38 year old male who has had ongoing back pain over the past two days after lifting a bag with pain radiating to the left front of his chest today. Differential includes musculoskeletal back pain, ACS, PE, pneumothorax, costochondritis, aortic dissection, or other causes. His workup is otherwise non-specific. EKG is reassuring as well a negative troponin with symptoms ongoing for greater than 6 hours. Patient is PERC negative and  low HEART score therefore I would highly doubt ACS and need for admission. Pain is reproducible along his left thoracic paraspinal area as well as with lateral rotation of his trunk, likely pointing to musculoskeletal back pain. He has no weakness or numbness. No concerning findings for neurovascular compromise or fracture or any need for imaging. At this time, he is safe for discharge. He will be discharged home with a course of flexeril and told to continue using ibuprofen and follow up with his primary care doctor is symptoms persist for physical therapy. If his symptoms start worsening such as chest pain, shortness of breath, or for any other concerns, he should return here.    Critical Care time:  none    Diagnosis:    ICD-10-CM    1. Acute back pain, unspecified back location, unspecified back pain laterality M54.9    2. Atypical chest pain R07.89        Disposition:  discharged to home    Discharge Medications:     Medication List      Started    cyclobenzaprine 10 MG tablet  Commonly known as:  FLEXERIL  10 mg, Oral, 3 TIMES DAILY PRN          Scribe Disclosure:  I, Sherley Barillas, am serving as a scribe on 2/17/2019 at 11:44 PM to personally document services performed by Federico Torres MD      providers found based on my observations and the provider's statements to me.     Sherley Barillas  2/17/2019   [facility] EMERGENCY DEPARTMENT       Federico Torres MD  02/18/19 [id number]

## 2019-02-18 NOTE — DISCHARGE INSTRUCTIONS
Discharge Instructions  Back Pain  You were seen today for back pain. Back pain can have many causes, but most will get better without surgery or other specific treatment. Sometimes there is a herniated (?slipped?) disc. We don?t usually do MRI scans to look for these right away, since most herniated discs will get better on their own with time.  Today, we did not find any evidence that your back pain was caused by a serious condition, such as an infection, fracture, or tumor. However, sometimes symptoms develop over time and cannot be found during an emergency visit, so it is very important that you follow up with your primary doctor.  Return to the Emergency Department if:  You develop a fever with your back pain.   You have weakness or change in sensation in one or both legs.  You lose control of your bowels or bladder, or can?t empty your bladder.  Your pain gets much worse.     Follow-up with your doctor:  Unless your pain has completely gone away, please make an appointment with your doctor within one week.  You may need further management of your back pain, such as more pain medication, imaging such as an X-ray or MRI, or physical therapy.    What can I do to help myself?  Remain Active -- People are often afraid that they will hurt their back further or delay recovery by remaining active, but this is one of the best things you can do for your back. In fact, prolonged bed rest is not recommended. Studies have shown that people with low back pain recover faster when they remain active. Movement helps to bring blood flow to the muscles and relieve muscle spasms as well as preventing loss of muscle strength.  Heat -- Using a heating pad can help with low back pain during the first few weeks. Do not sleep with a heating pad, as you can be burned.   Pain medications - You may take a pain medication such as Tylenol  (acetaminophen), Advil , Nuprin  (ibuprofen) or Aleve  (naproxen).  If you have been given a  narcotic such as Vicodin  (hydrocodone with acetaminophen), Percocet  (oxycodone with acetaminophen), codeine, or a muscle relaxant such as Flexeril  (cyclobenzaprine) or Soma  (carisoprodol), do not drive for four hours after you have taken it. If the narcotic contains Tylenol  (acetaminophen), do not take Tylenol  with it. All narcotics will cause constipation, so eat a high fiber diet.   If you were given a prescription for medicine here today, be sure to read all of the information (including the package insert) that comes with your prescription.  This will include important information about the medicine, its side effects, and any warnings that you need to know about.  The pharmacist who fills the prescription can provide more information and answer questions you may have about the medicine.  If you have questions or concerns that the pharmacist cannot address, please call or return to the Emergency Department.   Opioid Medication Information    Pain medications are among the most commonly prescribed medicines, so we are including this information for all our patients. If you did not receive pain medication or get a prescription for pain medicine, you can ignore it.     You may have been given a prescription for an opioid (narcotic) pain medicine and/or have received a pain medicine while here in the Emergency Department. These medicines can make you drowsy or impaired. You must not drive, operate dangerous equipment, or engage in any other dangerous activities while taking these medications. If you drive while taking these medications, you could be arrested for DUI, or driving under the influence. Do not drink any alcohol while you are taking these medications.     Opioid pain medications can cause addiction. If you have a history of chemical dependency of any type, you are at a higher risk of becoming addicted to pain medications.  Only take these prescribed medications to treat your pain when all other  options have been tried. Take it for as short a time and as few doses as possible. Store your pain pills in a secure place, as they are frequently stolen and provide a dangerous opportunity for children or visitors in your house to start abusing these powerful medications. We will not replace any lost or stolen medicine.  As soon as your pain is better, you should flush all your remaining medication.     Many prescription pain medications contain Tylenol  (acetaminophen), including Vicodin , Tylenol #3 , Norco , Lortab , and Percocet .  You should not take any extra pills of Tylenol  if you are using these prescription medications or you can get very sick.  Do not ever take more than 3000 mg of acetaminophen in any 24 hour period.    All opioids tend to cause constipation. Drink plenty of water and eat foods that have a lot of fiber, such as fruits, vegetables, prune juice, apple juice and high fiber cereal.  Take a laxative if you don?t move your bowels at least every other day. Miralax , Milk of Magnesia, Colace , or Senna  can be used to keep you regular.      Remember that you can always come back to the Emergency Department if you are not able to see your regular doctor in the amount of time listed above, if you get any new symptoms, or if there is anything that worries you.    Discharge Instructions  Chest Pain    You have been seen today for chest pain or discomfort.  At this time, your doctor has found no signs that your chest pain is due to a serious or life-threatening condition, (or you have declined more testing and/or admission to the hospital). However, sometimes there is a serious problem that does not show up right away. Your evaluation today may not be complete and you may need further testing and evaluation.     You need to follow-up with your regular doctor within 3 days.    Return to the Emergency Department if:  Your chest pain changes, gets worse, starts to happen more often, or comes with less  activity.  You are short of breath.  You get very weak or tired.  You pass out or faint.  You have any new symptoms, like fever, cough, numb legs, or you cough up blood.  You have anything else that worries you.    Until you follow-up with your regular doctor please do the following:  Take one aspirin daily unless you have an allergy or are told not to by your doctor.  If a stress test appointment has been made, go to the appointment.  If you have questions, contact your regular doctor.    If your doctor today has told you to follow-up with your regular doctor, it is very important that you make an appointment with your clinic and go to the appointment.  If you do not follow-up with your primary doctor, it may result in missing an important development which could result in permanent injury or disability and/or lasting pain.  If there is any problem keeping your appointment, call your doctor or return to the Emergency Department.    If you were given a prescription for medicine here today, be sure to read all of the information (including the package insert) that comes with your prescription.  This will include important information about the medicine, its side effects, and any warnings that you need to know about.  The pharmacist who fills the prescription can provide more information and answer questions you may have about the medicine.  If you have questions or concerns that the pharmacist cannot address, please call or return to the Emergency Department.     Opioid Medication Information    Pain medications are among the most commonly prescribed medicines, so we are including this information for all our patients. If you did not receive pain medication or get a prescription for pain medicine, you can ignore it.     You may have been given a prescription for an opioid (narcotic) pain medicine and/or have received a pain medicine while here in the Emergency Department. These medicines can make you drowsy or  impaired. You must not drive, operate dangerous equipment, or engage in any other dangerous activities while taking these medications. If you drive while taking these medications, you could be arrested for DUI, or driving under the influence. Do not drink any alcohol while you are taking these medications.     Opioid pain medications can cause addiction. If you have a history of chemical dependency of any type, you are at a higher risk of becoming addicted to pain medications.  Only take these prescribed medications to treat your pain when all other options have been tried. Take it for as short a time and as few doses as possible. Store your pain pills in a secure place, as they are frequently stolen and provide a dangerous opportunity for children or visitors in your house to start abusing these powerful medications. We will not replace any lost or stolen medicine.  As soon as your pain is better, you should flush all your remaining medication.     Many prescription pain medications contain Tylenol  (acetaminophen), including Vicodin , Tylenol #3 , Norco , Lortab , and Percocet .  You should not take any extra pills of Tylenol  if you are using these prescription medications or you can get very sick.  Do not ever take more than 3000 mg of acetaminophen in any 24 hour period.    All opioids tend to cause constipation. Drink plenty of water and eat foods that have a lot of fiber, such as fruits, vegetables, prune juice, apple juice and high fiber cereal.  Take a laxative if you don?t move your bowels at least every other day. Miralax , Milk of Magnesia, Colace , or Senna  can be used to keep you regular.      Remember that you can always come back to the Emergency Department if you are not able to see your regular doctor in the amount of time listed above, if you get any new symptoms, or if there is anything that worries you.

## 2019-05-21 ENCOUNTER — OFFICE VISIT (OUTPATIENT)
Dept: FAMILY MEDICINE | Facility: CLINIC | Age: 39
End: 2019-05-21
Payer: MEDICAID

## 2019-05-21 VITALS
HEART RATE: 72 BPM | TEMPERATURE: 97.3 F | WEIGHT: 234 LBS | SYSTOLIC BLOOD PRESSURE: 120 MMHG | HEIGHT: 66 IN | BODY MASS INDEX: 37.61 KG/M2 | OXYGEN SATURATION: 99 % | DIASTOLIC BLOOD PRESSURE: 84 MMHG

## 2019-05-21 DIAGNOSIS — R35.89 POLYURIA: ICD-10-CM

## 2019-05-21 DIAGNOSIS — R63.5 ABNORMAL WEIGHT GAIN: ICD-10-CM

## 2019-05-21 DIAGNOSIS — Z23 NEED FOR TDAP VACCINATION: Primary | ICD-10-CM

## 2019-05-21 DIAGNOSIS — Z86.0100 HISTORY OF COLONIC POLYPS: ICD-10-CM

## 2019-05-21 DIAGNOSIS — Z23 NEED FOR TETANUS BOOSTER: ICD-10-CM

## 2019-05-21 DIAGNOSIS — E66.01 MORBID OBESITY (H): ICD-10-CM

## 2019-05-21 DIAGNOSIS — Z12.5 SCREENING FOR PROSTATE CANCER: ICD-10-CM

## 2019-05-21 LAB
ANION GAP SERPL CALCULATED.3IONS-SCNC: 5 MMOL/L (ref 3–14)
BASOPHILS # BLD AUTO: 0 10E9/L (ref 0–0.2)
BASOPHILS NFR BLD AUTO: 0.4 %
BUN SERPL-MCNC: 11 MG/DL (ref 7–30)
CALCIUM SERPL-MCNC: 9.4 MG/DL (ref 8.5–10.1)
CHLORIDE SERPL-SCNC: 106 MMOL/L (ref 94–109)
CO2 SERPL-SCNC: 28 MMOL/L (ref 20–32)
CREAT SERPL-MCNC: 0.66 MG/DL (ref 0.66–1.25)
DIFFERENTIAL METHOD BLD: NORMAL
EOSINOPHIL # BLD AUTO: 0.1 10E9/L (ref 0–0.7)
EOSINOPHIL NFR BLD AUTO: 1.1 %
ERYTHROCYTE [DISTWIDTH] IN BLOOD BY AUTOMATED COUNT: 14.4 % (ref 10–15)
GFR SERPL CREATININE-BSD FRML MDRD: >90 ML/MIN/{1.73_M2}
GLUCOSE SERPL-MCNC: 85 MG/DL (ref 70–99)
HCT VFR BLD AUTO: 41.9 % (ref 40–53)
HGB BLD-MCNC: 14.2 G/DL (ref 13.3–17.7)
LYMPHOCYTES # BLD AUTO: 2.5 10E9/L (ref 0.8–5.3)
LYMPHOCYTES NFR BLD AUTO: 30.4 %
MCH RBC QN AUTO: 26.5 PG (ref 26.5–33)
MCHC RBC AUTO-ENTMCNC: 33.9 G/DL (ref 31.5–36.5)
MCV RBC AUTO: 78 FL (ref 78–100)
MONOCYTES # BLD AUTO: 0.7 10E9/L (ref 0–1.3)
MONOCYTES NFR BLD AUTO: 8.6 %
NEUTROPHILS # BLD AUTO: 4.9 10E9/L (ref 1.6–8.3)
NEUTROPHILS NFR BLD AUTO: 59.5 %
PLATELET # BLD AUTO: 274 10E9/L (ref 150–450)
POTASSIUM SERPL-SCNC: 4.4 MMOL/L (ref 3.4–5.3)
RBC # BLD AUTO: 5.35 10E12/L (ref 4.4–5.9)
SODIUM SERPL-SCNC: 139 MMOL/L (ref 133–144)
WBC # BLD AUTO: 8.2 10E9/L (ref 4–11)

## 2019-05-21 PROCEDURE — 84154 ASSAY OF PSA FREE: CPT | Performed by: FAMILY MEDICINE

## 2019-05-21 PROCEDURE — 90715 TDAP VACCINE 7 YRS/> IM: CPT | Performed by: FAMILY MEDICINE

## 2019-05-21 PROCEDURE — 99396 PREV VISIT EST AGE 40-64: CPT | Mod: 25 | Performed by: FAMILY MEDICINE

## 2019-05-21 PROCEDURE — 90471 IMMUNIZATION ADMIN: CPT | Performed by: FAMILY MEDICINE

## 2019-05-21 PROCEDURE — 85025 COMPLETE CBC W/AUTO DIFF WBC: CPT | Performed by: FAMILY MEDICINE

## 2019-05-21 PROCEDURE — 36415 COLL VENOUS BLD VENIPUNCTURE: CPT | Performed by: FAMILY MEDICINE

## 2019-05-21 PROCEDURE — 80048 BASIC METABOLIC PNL TOTAL CA: CPT | Performed by: FAMILY MEDICINE

## 2019-05-21 ASSESSMENT — ENCOUNTER SYMPTOMS
DIARRHEA: 0
DIZZINESS: 1
CHILLS: 0
ABDOMINAL PAIN: 0
HEMATURIA: 0
FEVER: 0
HEADACHES: 1
COUGH: 0
FREQUENCY: 0
CONSTIPATION: 0
EYE PAIN: 0
HEMATOCHEZIA: 0
NERVOUS/ANXIOUS: 0

## 2019-05-21 ASSESSMENT — MIFFLIN-ST. JEOR: SCORE: 1914.17

## 2019-05-21 NOTE — LETTER
[facility]   [address] [place]  [address]  [phone]                                   May 23, 2019    Isacc Wang  [address]   [facility] [id number]        Dear Isacc,    Your complete blood count is normal   Your psa is normal   Your basic metabolic panel which includes electrolytes,kidney function is normal and  -Glucose (diabetic screening test) is within normal limits    Follow up in 1 year(s)    Results for orders placed or performed in visit on 05/21/19   PSA, screen   Result Value Ref Range    PSA 0.97 0 - 4 ug/L   Basic metabolic panel   Result Value Ref Range    Sodium 139 133 - 144 mmol/L    Potassium 4.4 3.4 - 5.3 mmol/L    Chloride 106 94 - 109 mmol/L    Carbon Dioxide 28 20 - 32 mmol/L    Anion Gap 5 3 - 14 mmol/L    Glucose 85 70 - 99 mg/dL    Urea Nitrogen 11 7 - 30 mg/dL    Creatinine 0.66 0.66 - 1.25 mg/dL    GFR Estimate >90 >60 mL/min/[1.73_m2]    GFR Estimate If Black >90 >60 mL/min/[1.73_m2]    Calcium 9.4 8.5 - 10.1 mg/dL   CBC with platelets differential   Result Value Ref Range    WBC 8.2 4.0 - 11.0 10e9/L    RBC Count 5.35 4.4 - 5.9 10e12/L    Hemoglobin 14.2 13.3 - 17.7 g/dL    Hematocrit 41.9 40.0 - 53.0 %    MCV 78 78 - 100 fl    MCH 26.5 26.5 - 33.0 pg    MCHC 33.9 31.5 - 36.5 g/dL    RDW 14.4 10.0 - 15.0 %    Platelet Count 274 150 - 450 10e9/L    % Neutrophils 59.5 %    % Lymphocytes 30.4 %    % Monocytes 8.6 %    % Eosinophils 1.1 %    % Basophils 0.4 %    Absolute Neutrophil 4.9 1.6 - 8.3 10e9/L    Absolute Lymphocytes 2.5 0.8 - 5.3 10e9/L    Absolute Monocytes 0.7 0.0 - 1.3 10e9/L    Absolute Eosinophils 0.1 0.0 - 0.7 10e9/L    Absolute Basophils 0.0 0.0 - 0.2 10e9/L    Diff Method Automated Method        If you have any questions please call the clinic at [phone]    Sincerely,    Ernesto Simental MD  [facility]

## 2019-05-21 NOTE — PROGRESS NOTES
SUBJECTIVE:   CC: Isacc Wang is an 38 year old male who presents for preventative health visit.     Healthy Habits:     Getting at least 3 servings of Calcium per day:  Yes    Bi-annual eye exam:  Yes    Dental care twice a year:  Yes    Sleep apnea or symptoms of sleep apnea:  None    Diet:  Low salt    Frequency of exercise:  4-5 days/week    Duration of exercise:  30-45 minutes    Taking medications regularly:  Yes    Medication side effects:  None    PHQ-2 Total Score: 0    Additional concerns today:  No        Today's PHQ-2 Score:   PHQ-2 ( 1999 Pfizer) 5/21/2019   Q1: Little interest or pleasure in doing things 0   Q2: Feeling down, depressed or hopeless 0   PHQ-2 Score 0   Q1: Little interest or pleasure in doing things Not at all   Q2: Feeling down, depressed or hopeless Not at all   PHQ-2 Score 0       Abuse: Current or Past(Physical, Sexual or Emotional)- No  Do you feel safe in your environment? Yes    Social History     Tobacco Use     Smoking status: Never Smoker     Smokeless tobacco: Never Used   Substance Use Topics     Alcohol use: Yes     Comment: Socially     Alcohol Use 5/21/2019   Prescreen: >3 drinks/day or >7 drinks/week? No   Prescreen: >3 drinks/day or >7 drinks/week? -   No flowsheet data found.    Last PSA:   PSA   Date Value Ref Range Status   09/29/2017 1.20 0 - 4 ug/L Final     Comment:     Assay Method:  Chemiluminescence using Siemens Vista analyzer       Reviewed orders with patient. Reviewed health maintenance and updated orders accordingly - Yes  Labs reviewed in EPIC  BP Readings from Last 3 Encounters:   05/21/19 120/84   02/17/19 (!) 149/103   10/26/17 119/81    Wt Readings from Last 3 Encounters:   05/21/19 106.1 kg (234 lb)   02/17/19 99.8 kg (220 lb)   10/19/17 95.3 kg (210 lb)                  Patient Active Problem List   Diagnosis     Hyperlipidemia LDL goal <130     Obesity     Acute idiopathic gout of right foot     Family history of colon cancer     Obesity (BMI  35.0-39.9) with comorbidity (H)     Past Surgical History:   Procedure Laterality Date     C NONSPECIFIC PROCEDURE      cyst removed right ear     COLONOSCOPY WITH CO2 INSUFFLATION N/A 10/26/2017    Procedure: COLONOSCOPY WITH CO2 INSUFFLATION;  COLON SCREEN/ FAMILY HX/ SPECIAL SCREENING FOR MALIGNANT NEOPLASMS/ [id number];  Surgeon: Alfonso Ortega MD;  Location:  OR       Social History     Tobacco Use     Smoking status: Never Smoker     Smokeless tobacco: Never Used   Substance Use Topics     Alcohol use: Yes     Comment: Socially     Family History   Problem Relation Age of Onset     Hypertension Mother      Cancer Mother         cervical cancer     Depression Mother      Thyroid Disease Mother      Diabetes Father      Hypertension Father      Cerebrovascular Disease Father      Depression Father      Heart Disease Father      Lipids Father      Colon Cancer Father      Colon Cancer Maternal Grandmother      Colon Cancer Maternal Uncle          Current Outpatient Medications   Medication Sig Dispense Refill     ibuprofen (ADVIL/MOTRIN) 800 MG tablet Take 1 tablet (800 mg) by mouth every 8 hours as needed for moderate pain (Patient not taking: Reported on 5/21/2019) 90 tablet 1     sildenafil (REVATIO) 20 MG tablet TAKE 1 TO 3 TABLETS AS NEEDED 1 HOUR BEFORE SEXUAL ACTIVITY FOR ERECTILE DYSFUNCTION. NEVER USE WITH NITROGLYCERIN, TERAZOSIN, OR DOXAZOSIN (Patient not taking: Reported on 5/21/2019) 20 tablet 0     No Known Allergies    Reviewed and updated as needed this visit by clinical staff         Reviewed and updated as needed this visit by Provider        Past Medical History:   Diagnosis Date     Esophageal reflux      Hyperlipidemia LDL goal <130 10/9/2009     Obesity 10/21/2009      Past Surgical History:   Procedure Laterality Date     C NONSPECIFIC PROCEDURE      cyst removed right ear     COLONOSCOPY WITH CO2 INSUFFLATION N/A 10/26/2017    Procedure: COLONOSCOPY WITH CO2 INSUFFLATION;   "COLON SCREEN/ FAMILY HX/ SPECIAL SCREENING FOR MALIGNANT NEOPLASMS/ KAREN;  Surgeon: Alfonso Ortega MD;  Location: [facility] OR       Review of Systems   Constitutional: Negative for chills and fever.   HENT: Positive for ear pain. Negative for congestion.    Eyes: Negative for pain.   Respiratory: Negative for cough.    Cardiovascular: Negative for chest pain.   Gastrointestinal: Negative for abdominal pain, constipation, diarrhea and hematochezia.   Genitourinary: Negative for frequency, genital sores and hematuria.   Neurological: Positive for dizziness and headaches.   Psychiatric/Behavioral: The patient is not nervous/anxious.      History of gout       OBJECTIVE:   /84 (BP Location: Right arm, Patient Position: Sitting, Cuff Size: Adult Large)   Pulse 72   Temp 97.3  F (36.3  C) (Oral)   Ht 1.676 m (5' 6\")   Wt 106.1 kg (234 lb)   SpO2 99%   BMI 37.77 kg/m       Wt Readings from Last 4 Encounters:   05/21/19 106.1 kg (234 lb)   02/17/19 99.8 kg (220 lb)   10/19/17 95.3 kg (210 lb)   09/29/17 95.3 kg (210 lb)       Physical Exam  GENERAL: healthy, alert and no distress; obese has gained 20 lb in 2 years   EYES: Eyes grossly normal to inspection, PERRL and conjunctivae and sclerae normal  HENT: ear canals and TM's normal, nose and mouth without ulcers or lesions  NECK: no adenopathy, no asymmetry, masses, or scars and thyroid normal to palpation; no bruits   RESP: lungs clear to auscultation - no rales, rhonchi or wheezes  CV: regular rate and rhythm, normal S1 S2, no S3 or S4, no murmur, click or rub, no peripheral edema and peripheral pulses strong  ABDOMEN: soft, nontender, no hepatosplenomegaly, no masses and bowel sounds normal  MS: no gross musculoskeletal defects noted, no edema  SKIN: no suspicious lesions or rashes  NEURO: Normal strength and tone, mentation intact and speech normal  PSYCH: mentation appears normal, affect normal/bright    ASSESSMENT/PLAN:       ICD-10-CM    1. Need " "for Tdap vaccination Z23 TDAP, IM (10 - 64 YRS) - [facility]     VACCINE ADMINISTRATION, INITIAL   2. Need for tetanus booster Z23 TDAP, IM (10 - 64 YRS) - Adacel     VACCINE ADMINISTRATION, INITIAL   3. Morbid obesity (H) E66.01      Son is getting obese   Son has epilepsy     Patient thinks that he is stressed and eating more because of it   He is not    He lives with 2 sons     Suggested weighing     COUNSELING:   Reviewed preventive health counseling, as reflected in patient instructions       Regular exercise       Healthy diet/nutrition    Estimated body mass index is 35.51 kg/m  as calculated from the following:    Height as of 2/17/19: 1.676 m (5' 6\").    Weight as of 2/17/19: 99.8 kg (220 lb).     Weight management plan: Discussed healthy diet and exercise guidelines     reports that he has never smoked. He has never used smokeless tobacco.      Counseling Resources:  ATP IV Guidelines  Pooled Cohorts Equation Calculator  FRAX Risk Assessment  ICSI Preventive Guidelines  Dietary Guidelines for Americans, 2010  USDA's MyPlate  ASA Prophylaxis  Lung CA Screening    Ernesto Simental MD  [facility]  "

## 2019-05-21 NOTE — PATIENT INSTRUCTIONS
Preventive Health Recommendations  Male Ages 40 to 49    Yearly exam:             See your health care provider every year in order to  o   Review health changes.   o   Discuss preventive care.    o   Review your medicines if your doctor has prescribed any.    You should be tested each year for STDs (sexually transmitted diseases) if you re at risk.     Have a cholesterol test every 5 years.     Have a colonoscopy (test for colon cancer) if someone in your family has had colon cancer or polyps before age 50.     After age 45, have a diabetes test (fasting glucose). If you are at risk for diabetes, you should have this test every 3 years.      Talk with your health care provider about whether or not a prostate cancer screening test (PSA) is right for you.    Shots: Get a flu shot each year. Get a tetanus shot every 10 years.     Nutrition:    Eat at least 5 servings of fruits and vegetables daily.     Eat whole-grain bread, whole-wheat pasta and brown rice instead of white grains and rice.     Get adequate Calcium and Vitamin D.     Lifestyle    Exercise for at least 150 minutes a week (30 minutes a day, 5 days a week). This will help you control your weight and prevent disease.     Limit alcohol to one drink per day.     No smoking.     Wear sunscreen to prevent skin cancer.     See your dentist every six months for an exam and cleaning.      Patient Education     Weight Management: Exercise and Activity    Studies show that people who exercise are the most likely to lose weight and keep it off. Exercise burns calories. It helps build muscle to make your body stronger. Make exercise an important part of your weight-management plan.  Make activity part of your day  You may not think you have the time to exercise. But you can work activity into your daily life--you just need to be committed. Take 10 minutes out of your lunch hour to take a walk. Walk to the [employer] to get your paper instead of having it delivered.  Make it a habit to take the stairs instead of the elevator. Park in a far away parking spot instead of the closest. You ll be surprised at how fast these little changes can make a difference.  Some people really cannot walk very far, and tire out quickly with exercise. Instead of becoming discouraged, resolve to do what you can do, and work to make that a regular frequent habit.   The benefits of exercise  Exercise offers many benefits including:     Exercise increases your metabolism (the speed at which your body burns calories).    Regular exercise can increase the amount of muscle in your body. Muscle burns calories faster than fat. The more muscle you have, the more calories you burn.    Exercise gives you energy and curbs your appetite.    Exercise decreases stress and helps you sleep better. Find out for yourself what time of day works best for you.  Make exercise fun  Exercise can be fun. Choose an activity you enjoy. You may even get a friend to do it with you:    Take a resistance-training or aerobics class    Join a team sport    Take a dance class    Walk the dog    Ride a bike  If you have health problems, be sure to ask your healthcare provider before you start an exercise program. Have a  help you develop a plan that s safe for you.   Date Last Reviewed: 4/1/2018 2000-2018 [employer] [address] [facility], [place] [address]. All rights reserved. This information is not intended as a substitute for professional medical care. Always follow your healthcare professional's instructions.

## 2019-05-21 NOTE — NURSING NOTE
Screening Questionnaire for Adult Immunization    Are you sick today?   No   Do you have allergies to medications, food, a vaccine component or latex?   No   Have you ever had a serious reaction after receiving a vaccination?   No   Do you have a long-term health problem with heart disease, lung disease, asthma, kidney disease, metabolic disease (e.g. diabetes), anemia, or other blood disorder?   No   Do you have cancer, leukemia, HIV/AIDS, or any other immune system problem?   No   In the past 3 months, have you taken medications that affect  your immune system, such as prednisone, other steroids, or anticancer drugs; drugs for the treatment of rheumatoid arthritis, Crohn s disease, or psoriasis; or have you had radiation treatments?   No   Have you had a seizure, or a brain or other nervous system problem?   No   During the past year, have you received a transfusion of blood or blood     products, or been given immune (gamma) globulin or antiviral drug?   No   For women: Are you pregnant or is there a chance you could become        pregnant during the next month?   No   Have you received any vaccinations in the past 4 weeks?   No     Immunization questionnaire answers were all negative.      Patient instructed to remain in clinic for 15 minutes afterwards, and to report any adverse reaction to me immediately.    Vaccine information supplied.      Screening performed by [facility] on 5/21/2019 at 4:36 PM.

## 2019-05-22 LAB — PSA SERPL-ACNC: 0.97 UG/L (ref 0–4)

## 2019-05-23 ENCOUNTER — TELEPHONE (OUTPATIENT)
Dept: FAMILY MEDICINE | Facility: CLINIC | Age: 39
End: 2019-05-23

## 2019-05-23 NOTE — TELEPHONE ENCOUNTER
Notes recorded by Ernesto Simental MD on 5/23/2019 at 1:48 PM CDT  Your complete blood count is normal   Your psa is normal   Your basic metabolic panel which includes electrolytes,kidney function is normal and  -Glucose (diabetic screening test) is within normal limits    Follow up in 1 year(s)        Called and relayed above to Isacc, he expressed understanding.  Valerie Enrique RN

## 2019-05-23 NOTE — TELEPHONE ENCOUNTER
Reason for Call:  Request for results:    Name of test or procedure: Lab results    Date of test of procedure: Tuesday 5/21/19    Location of the test or procedure: [facility] to leave the result message on voice mail or with a family member? YES    Phone number Patient can be reached at:  Home number on file [phone] (home)    Additional comments: Would like test results    Thank you!  Juana RIGGS  Patient Representative  [facility]        Call taken on 5/23/2019 at 3:40 PM by Juana Maravilla

## 2019-05-23 NOTE — RESULT ENCOUNTER NOTE
Your complete blood count is normal   Your psa is normal   Your basic metabolic panel which includes electrolytes,kidney function is normal and  -Glucose (diabetic screening test) is within normal limits    Follow up in 1 year(s)

## 2019-12-09 ENCOUNTER — OFFICE VISIT (OUTPATIENT)
Dept: URGENT CARE | Facility: URGENT CARE | Age: 39
End: 2019-12-09
Payer: COMMERCIAL

## 2019-12-09 VITALS
BODY MASS INDEX: 38.25 KG/M2 | HEART RATE: 85 BPM | OXYGEN SATURATION: 98 % | SYSTOLIC BLOOD PRESSURE: 142 MMHG | WEIGHT: 237 LBS | DIASTOLIC BLOOD PRESSURE: 98 MMHG | RESPIRATION RATE: 16 BRPM

## 2019-12-09 DIAGNOSIS — S39.92XA BACK INJURY, INITIAL ENCOUNTER: Primary | ICD-10-CM

## 2019-12-09 PROCEDURE — 99213 OFFICE O/P EST LOW 20 MIN: CPT | Performed by: FAMILY MEDICINE

## 2019-12-09 NOTE — LETTER
[facility]  [address] [address]  [phone]      December 9, 2019    RE:  Isacc Wang                                                                                                                                                       [address]   [place] [place] [address]            To whom it may concern:    Isacc Wang is under my professional care for Back injury, initial encounter.   He  may return to work with the following: No working or lifting restrictions on or about today.          Sincerely,        Mayank Godwin, DO    [facility]

## 2019-12-09 NOTE — PROGRESS NOTES
SUBJECTIVE:  Chief Complaint   Patient presents with     Fall     Pt fell and bruised L leg and mid back X 6 days   .ident presents with a chief complaint of left back.  The injury occurred days ago.   The injury happened while while walking.   How: trauma: sliped on ice immediate pain  The patient complained of moderate pain and has had decreased ROM.    Pain exacerbated by movement    He treated it initially with no therapy.   This is the first time this type of injury has occurred to this patient.     Past Medical History:   Diagnosis Date     Esophageal reflux      Hyperlipidemia LDL goal <130 10/9/2009     Obesity 10/21/2009     No Known Allergies  Social History     Tobacco Use     Smoking status: Never Smoker     Smokeless tobacco: Never Used   Substance Use Topics     Alcohol use: Yes     Comment: Socially       ROSINTEGUMENTARY/SKIN: NEGATIVE for open wound/bleeding and NEGATIVE for bruising  MUSCULOSKELETAL: NEGATIVE for joint swelling, paresthesias, radicular pain    EXAM: BP (!) 142/98   Pulse 85   Resp 16   Wt 107.5 kg (237 lb)   SpO2 98%   BMI 38.25 kg/m  Gen: healthy,alert,no distress  Extremity: back has no pain with palpatiom or ro .   There is not compromise to the distal circulation.  Pulses are +2 and CRT is brisk.GENERAL APPEARANCE: healthy, alert and no distress  EXTREMITIES: peripheral pulses normal  SKIN: no suspicious lesions or rashes  NEURO: Normal strength and tone, sensory exam grossly normal, mentation intact and speech normal    X-RAY was not done.      ICD-10-CM    1. Back injury, initial encounter S39.92XA      [facility]  RICE

## 2020-06-09 ENCOUNTER — VIRTUAL VISIT (OUTPATIENT)
Dept: FAMILY MEDICINE | Facility: CLINIC | Age: 40
End: 2020-06-09
Payer: COMMERCIAL

## 2020-06-09 DIAGNOSIS — J30.1 SEASONAL ALLERGIC RHINITIS DUE TO POLLEN: Primary | ICD-10-CM

## 2020-06-09 PROCEDURE — 99213 OFFICE O/P EST LOW 20 MIN: CPT | Mod: 95 | Performed by: PHYSICIAN ASSISTANT

## 2020-06-09 RX ORDER — FLUTICASONE PROPIONATE 50 MCG
2 SPRAY, SUSPENSION (ML) NASAL DAILY
Qty: 16 G | Refills: 11 | Status: SHIPPED | OUTPATIENT
Start: 2020-06-09 | End: 2020-06-26

## 2020-06-09 RX ORDER — CETIRIZINE HYDROCHLORIDE 10 MG/1
10 TABLET ORAL DAILY
Qty: 90 TABLET | Refills: 1 | Status: SHIPPED | OUTPATIENT
Start: 2020-06-09 | End: 2020-06-26

## 2020-06-09 NOTE — PROGRESS NOTES
"Isacc Wang is a 39 year old male who is being evaluated via a billable telephone visit.      The patient has been notified of following:     \"This telephone visit will be conducted via a call between you and your physician/provider. We have found that certain health care needs can be provided without the need for a physical exam.  This service lets us provide the care you need with a short phone conversation.  If a prescription is necessary we can send it directly to your pharmacy.  If lab work is needed we can place an order for that and you can then stop by our lab to have the test done at a later time.    Telephone visits are billed at different rates depending on your insurance coverage. During this emergency period, for some insurers they may be billed the same as an in-person visit.  Please reach out to your insurance provider with any questions.    If during the course of the call the physician/provider feels a telephone visit is not appropriate, you will not be charged for this service.\"    Patient has given verbal consent for Telephone visit?  Yes    What phone number would you like to be contacted at? [phone]    How would you like to obtain your AVS? Mail a copy    Subjective     Isacc Wang is a 39 year old male who presents via phone visit today for the following health issues:    HPI  Acute Illness   Acute illness concerns: Sore Throat   Onset: x1.5-2 weeks give or  take    Fever: no    Chills/Sweats: no    Headache (location?): no    Sinus Pressure:YES- tender    Conjunctivitis:  no    Ear Pain: no    Rhinorrhea: no     Congestion: no    Sore Throat: YES- feels like his glands are swollen     Cough: no    Wheeze: no     Decreased Appetite: no     Nausea: no    Vomiting: no    Diarrhea:  no    Dysuria/Freq.: no    Fatigue/Achiness: no     Sick/Strep Exposure: no      Therapies Tried and outcome: OTC Claritin indoor/outdoor Medication, drinking tea       Patient was diagnosed with COVID-19 " "5/1/20 [place] [facility]     He notes that he has a history of acid reflux. He had been taking zantac but has not been taking it since it was discontinued.   When he swallows it feels like something is there. Wondering if his glands are swollen.   \"A different type of discomfort\"  When he tries to clear his throat it feels weird. When he tries to cough up, he can get a little bit of mucus. He notes he doesn't feel this at home. Usually happens when he is outdoors. He has a history of seasonal allergies.     No bad taste in his mouth in morning. No acid reflux symptoms when he wakes.   He has been taking claritin, which helps some, not lasting the whole day.     He has been having itchy and watery eyes.     Reviewed and updated as needed this visit by Provider  Tobacco  Allergies  Meds  Problems  Med Hx  Surg Hx  Fam Hx         Review of Systems   Constitutional, HEENT, cardiovascular, pulmonary, gi and gu systems are negative, except as otherwise noted.       Objective   Reported vitals:  There were no vitals taken for this visit.   PSYCH: Alert and oriented times 3; coherent speech, normal   rate and volume, able to articulate logical thoughts, able   to abstract reason, no tangential thoughts, no hallucinations   or delusions  His affect is normal  RESP: No cough, no audible wheezing, able to talk in full sentences  Remainder of exam unable to be completed due to telephone visits    Diagnostic Test Results:  none         Assessment/Plan:  1. Seasonal allergic rhinitis due to pollen  Change to Zyrtec daily and flonase daily. Continue taking the whole summer.   - cetirizine (ZYRTEC) 10 MG tablet; Take 1 tablet (10 mg) by mouth daily  Dispense: 90 tablet; Refill: 1  - fluticasone (FLONASE) 50 MCG/ACT nasal spray; Spray 2 sprays into both nostrils daily  Dispense: 16 g; Refill: 11    No follow-ups on file.      Phone call duration:  8 minutes    Mishel Rivera PA-C      "

## 2020-06-09 NOTE — LETTER
June 9, 2020      Isacc Wang  [address]   [facility] [place] [address]        To Whom It May Concern:    Isacc Wang here is your After Visit Summary from your telephone visit today.            Sincerely,        Mishel Rivera PA-C

## 2020-06-25 ENCOUNTER — TELEPHONE (OUTPATIENT)
Dept: FAMILY MEDICINE | Facility: CLINIC | Age: 40
End: 2020-06-25

## 2020-06-25 NOTE — TELEPHONE ENCOUNTER
Reason for call:  Patient reporting a symptom    Symptom or request: possible allergic reaction    Duration (how long have symptoms been present): today    Have you been treated for this before? No    Additional comments: Patient had stated that he had taken a medication and is now having a reaction. He had said that he is having troubles swallowing. Isacc is wanting to get an appointment with the clinic. Please call patient to discuss.     Phone Number patient can be reached at:  Home number on file [phone] (home)    Best Time:  ASAP    Can we leave a detailed message on this number:  YES    Call taken on 6/25/2020 at 10:51 AM by Paris Cruz

## 2020-06-25 NOTE — TELEPHONE ENCOUNTER
Reason for Call:  Other     Detailed comments: TC offered telephone visit and patient refused and stated he wanted to be seen for heart burn and throat issue.    Phone Number Patient can be reached at: Home number on file [phone] (home)    Best Time:     Can we leave a detailed message on this number? YES    Call taken on 6/25/2020 at 9:30 AM by Aggie Marion

## 2020-06-25 NOTE — TELEPHONE ENCOUNTER
Attempt # 1  Called patient at home number.[phone] (home)     Was call answered?  Yes, went to [facility] to get checked out, having a swallowing issue could be due to allergy season, stomach issue when eats certain foods has a weird sensation. Rates swallowing 6/10, extra beats of heart for a minute or 2 usually when yelling at the kids intermittent possibly from anxiety/upset. [facility] did see some swelling in back of throat not  Was on Xantac for acid reflux but off that taking Tums about 3 or 4 times per day before meals. Feels difficult to swallow but not sore. [facility] did see redness. No strep throat.   Told to take honey and tea. When swallows feels like a hair ball. Appetite is normal, sometimes coughs but not constant. admits headache thinks hunger headache resolves after eating. Drinks a lot of water and Gatorade, taking Vitamin C,     DENIES: dyspnea/SOB, fever, difficulty swallowing    1 month and 2 weeks ago was tested positive for Covid-19 not the same symptoms - symptoms at that time: SOB, runny stools, off and on hot flashes.   Patient declining telephone visit at this time.  Last Zyrtec was about 2 weeks ago and symptoms started about the same time as stopped taking the Zyrtec.     Nurse advised patient to gargle with warm salt water - do not swallow, hot tea with honey. Take the Zyrtec daily. Go to ER if dyspnea/SOB, difficulty swallowing occurs. Call clinic if new symptoms occur or symptoms worsen.  Patient verbalized understanding and agreement with plan and had no questions.         María Lr RN  [facility]

## 2020-06-26 ENCOUNTER — OFFICE VISIT (OUTPATIENT)
Dept: FAMILY MEDICINE | Facility: CLINIC | Age: 40
End: 2020-06-26
Payer: COMMERCIAL

## 2020-06-26 VITALS
WEIGHT: 223 LBS | BODY MASS INDEX: 35.84 KG/M2 | DIASTOLIC BLOOD PRESSURE: 86 MMHG | HEART RATE: 88 BPM | SYSTOLIC BLOOD PRESSURE: 127 MMHG | HEIGHT: 66 IN

## 2020-06-26 DIAGNOSIS — J30.1 SEASONAL ALLERGIC RHINITIS DUE TO POLLEN: ICD-10-CM

## 2020-06-26 DIAGNOSIS — K21.00 GASTROESOPHAGEAL REFLUX DISEASE WITH ESOPHAGITIS: Primary | ICD-10-CM

## 2020-06-26 PROCEDURE — 99213 OFFICE O/P EST LOW 20 MIN: CPT | Performed by: INTERNAL MEDICINE

## 2020-06-26 RX ORDER — FLUTICASONE PROPIONATE 50 MCG
2 SPRAY, SUSPENSION (ML) NASAL DAILY
Qty: 16 G | Refills: 11 | Status: SHIPPED | OUTPATIENT
Start: 2020-06-26 | End: 2020-12-09

## 2020-06-26 RX ORDER — ESOMEPRAZOLE MAGNESIUM 40 MG/1
40 GRANULE, DELAYED RELEASE ORAL DAILY
Qty: 31 EACH | Refills: 3 | Status: SHIPPED | OUTPATIENT
Start: 2020-06-26 | End: 2020-08-14

## 2020-06-26 RX ORDER — CETIRIZINE HYDROCHLORIDE 10 MG/1
10 TABLET ORAL DAILY
Qty: 90 TABLET | Refills: 1 | Status: SHIPPED | OUTPATIENT
Start: 2020-06-26 | End: 2020-12-09

## 2020-06-26 ASSESSMENT — MIFFLIN-ST. JEOR: SCORE: 1859.27

## 2020-06-26 NOTE — PROGRESS NOTES
Subjective     Isacc Wang is a 39 year old male who presents to clinic today for the following health issues:    HPI   Concern - tightness of the throat    Description:   Due to allergies vs GERD. Stop taking medication for a few days. Then restatred this 2 days ago. Medication has help some compare compare to last few days.    Precipitating factors:   Worsened by: doesn't matter, no certain time. Notice this more outdoors    GERD/Heartburn    Description:     Burning in chest: not really    Intensity:  Currently 4-5/10     Progression of Symptoms:     Accompanying Signs & Symptoms:  Does it feel like food gets stuck: no goes down well  Nausea: YES  Vomiting (bloody?): no dry heaves  Abdominal Pain: YES stomach area.  Black-Tarry stools: no :  Bloody stools: no     History:   Previous ulcers: YES had EGD done 10+ years ago    Precipitating factors:   Caffeine use: YES coffee   Alcohol use: no   NSAID/Aspirin use: no   Tobacco use: no   Worse with no particular food or drink.    Alleviating factors:      Therapies Tried and outcome: Tums, honey tea, Flonase, allergy pill and some tylenol.    Due to recall stop taking zantac. He is doing Tums currently.        Patient Active Problem List   Diagnosis     Hyperlipidemia LDL goal <130     Obesity     Acute idiopathic gout of right foot     Family history of colon cancer     Obesity (BMI 35.0-39.9) with comorbidity (H)     Past Surgical History:   Procedure Laterality Date     C NONSPECIFIC PROCEDURE      cyst removed right ear     COLONOSCOPY WITH CO2 INSUFFLATION N/A 10/26/2017    Procedure: COLONOSCOPY WITH CO2 INSUFFLATION;  COLON SCREEN/ FAMILY HX/ SPECIAL SCREENING FOR MALIGNANT NEOPLASMS/ ZOWNIROWYCZ;  Surgeon: Alfonso Ortega MD;  Location:  OR       Social History     Tobacco Use     Smoking status: Never Smoker     Smokeless tobacco: Never Used   Substance Use Topics     Alcohol use: Yes     Comment: Socially     Family History   Problem Relation  "Age of Onset     Hypertension Mother      Cancer Mother         cervical cancer     Depression Mother      Thyroid Disease Mother      Diabetes Father      Hypertension Father      Cerebrovascular Disease Father      Depression Father      Heart Disease Father      Lipids Father      Colon Cancer Father      Colon Cancer Maternal Grandmother      Colon Cancer Maternal Uncle          Current Outpatient Medications   Medication Sig Dispense Refill     cetirizine (ZYRTEC) 10 MG tablet Take 1 tablet (10 mg) by mouth daily 90 tablet 1     Esomeprazole Magnesium (NEXIUM) 40 MG PACK Take 1 packet (40 mg) by mouth daily 31 each 3     fluticasone (FLONASE) 50 MCG/ACT nasal spray Spray 2 sprays into both nostrils daily 16 g 11     sildenafil (REVATIO) 20 MG tablet TAKE 1 TO 3 TABLETS AS NEEDED 1 HOUR BEFORE SEXUAL ACTIVITY FOR ERECTILE DYSFUNCTION. NEVER USE WITH NITROGLYCERIN, TERAZOSIN, OR DOXAZOSIN (Patient not taking: Reported on 5/21/2019) 20 tablet 0     No Known Allergies  Recent Labs   Lab Test 05/21/19  1203 02/17/19  2220 12/08/15  0859   LDL  --   --  112*   HDL  --   --  58   TRIG  --   --  109   ALT  --  59  --    CR 0.66 0.71  --    GFRESTIMATED >90 >90  --    GFRESTBLACK >90 >90  --    POTASSIUM 4.4 4.0  --       BP Readings from Last 3 Encounters:   06/26/20 127/86   12/09/19 (!) 142/98   05/21/19 120/84    Wt Readings from Last 3 Encounters:   06/26/20 101.2 kg (223 lb)   12/09/19 107.5 kg (237 lb)   05/21/19 106.1 kg (234 lb)                    Reviewed and updated as needed this visit by Provider         Review of Systems   Constitutional, HEENT, cardiovascular, pulmonary, gi and gu systems are negative, except as otherwise noted.      Objective    /86   Pulse 88   Ht 1.676 m (5' 6\")   Wt 101.2 kg (223 lb)   BMI 35.99 kg/m    Body mass index is 35.99 kg/m .  Physical Exam   GENERAL: healthy, alert and no distress  NECK: no adenopathy, no asymmetry, masses, or scars and thyroid normal to " "palpation  RESP: lungs clear to auscultation - no rales, rhonchi or wheezes  CV: regular rate and rhythm, normal S1 S2, no S3 or S4, no murmur, click or rub, no peripheral edema and peripheral pulses strong  ABDOMEN: soft, nontender, no hepatosplenomegaly, no masses and bowel sounds normal  MS: no gross musculoskeletal defects noted, no edema    Diagnostic Test Results:  Labs reviewed in Epic  No results found for any visits on 06/26/20.        Assessment & Plan       ICD-10-CM    1. Gastroesophageal reflux disease with esophagitis  K21.0 Esomeprazole Magnesium (NEXIUM) 40 MG PACK   2. Seasonal allergic rhinitis due to pollen  J30.1 cetirizine (ZYRTEC) 10 MG tablet     fluticasone (FLONASE) 50 MCG/ACT nasal spray      no evidence of cardiac involvement  No evidence of respiratory involvement    BMI:   Estimated body mass index is 35.99 kg/m  as calculated from the following:    Height as of this encounter: 1.676 m (5' 6\").    Weight as of this encounter: 101.2 kg (223 lb).   Weight management plan: Discussed healthy diet and exercise guidelines        Regular exercise    No follow-ups on file.    Grabiel Bajwa MD  [facility]    "

## 2020-06-26 NOTE — PROGRESS NOTES
Not as significantly  [facility]  Some irritation in the area  Swallow zantac for a long time   Has been off since the recal    Vomited a little with eating  tums   Heart beating

## 2020-07-13 ENCOUNTER — TELEPHONE (OUTPATIENT)
Dept: FAMILY MEDICINE | Facility: CLINIC | Age: 40
End: 2020-07-13

## 2020-07-13 NOTE — TELEPHONE ENCOUNTER
Reason for Call:  Other appointment    Detailed comments: PT stated needed a face to face follow up and requested to speak with Care team.  Appt scheduled for 8/12/2020 at 12:40 pm first available but would like an earlier appt.      Phone Number Patient can be reached at: Home number on file [phone] (home)    Best Time: Anytime    Can we leave a detailed message on this number? YES    Call taken on 7/13/2020 at 9:34 AM by Andressa Hagen

## 2020-07-16 ENCOUNTER — ANCILLARY PROCEDURE (OUTPATIENT)
Dept: GENERAL RADIOLOGY | Facility: CLINIC | Age: 40
End: 2020-07-16
Attending: INTERNAL MEDICINE
Payer: COMMERCIAL

## 2020-07-16 ENCOUNTER — OFFICE VISIT (OUTPATIENT)
Dept: FAMILY MEDICINE | Facility: CLINIC | Age: 40
End: 2020-07-16
Payer: COMMERCIAL

## 2020-07-16 VITALS
DIASTOLIC BLOOD PRESSURE: 87 MMHG | TEMPERATURE: 98.5 F | WEIGHT: 224 LBS | HEART RATE: 79 BPM | BODY MASS INDEX: 36.15 KG/M2 | SYSTOLIC BLOOD PRESSURE: 126 MMHG | OXYGEN SATURATION: 98 %

## 2020-07-16 DIAGNOSIS — J18.9 ATYPICAL PNEUMONIA: ICD-10-CM

## 2020-07-16 DIAGNOSIS — R13.19 ESOPHAGEAL DYSPHAGIA: ICD-10-CM

## 2020-07-16 DIAGNOSIS — R13.19 ESOPHAGEAL DYSPHAGIA: Primary | ICD-10-CM

## 2020-07-16 PROCEDURE — 71046 X-RAY EXAM CHEST 2 VIEWS: CPT

## 2020-07-16 PROCEDURE — 70360 X-RAY EXAM OF NECK: CPT

## 2020-07-16 PROCEDURE — 99214 OFFICE O/P EST MOD 30 MIN: CPT | Performed by: INTERNAL MEDICINE

## 2020-07-16 RX ORDER — AZITHROMYCIN 250 MG/1
TABLET, FILM COATED ORAL
Qty: 6 TABLET | Refills: 0 | Status: SHIPPED | OUTPATIENT
Start: 2020-07-16 | End: 2020-08-14

## 2020-07-16 NOTE — PROGRESS NOTES
Subjective     Isacc Wang is a 39 year old male who presents to clinic today for the following health issues:    HPI     Recheck swelling  Discuss swallowing concerns; possible xray  Does not hurt to swallow and swollen in the back  Not hurt to breathe  Heart burn in the area        Patient Active Problem List   Diagnosis     Hyperlipidemia LDL goal <130     Obesity     Acute idiopathic gout of right foot     Family history of colon cancer     Obesity (BMI 35.0-39.9) with comorbidity (H)     Past Surgical History:   Procedure Laterality Date     C NONSPECIFIC PROCEDURE      cyst removed right ear     COLONOSCOPY WITH CO2 INSUFFLATION N/A 10/26/2017    Procedure: COLONOSCOPY WITH CO2 INSUFFLATION;  COLON SCREEN/ FAMILY HX/ SPECIAL SCREENING FOR MALIGNANT NEOPLASMS/ ZOWNIROWYCZ;  Surgeon: Alfonso Ortega MD;  Location:  OR       Social History     Tobacco Use     Smoking status: Never Smoker     Smokeless tobacco: Never Used   Substance Use Topics     Alcohol use: Yes     Comment: Socially     Family History   Problem Relation Age of Onset     Hypertension Mother      Cancer Mother         cervical cancer     Depression Mother      Thyroid Disease Mother      Diabetes Father      Hypertension Father      Cerebrovascular Disease Father      Depression Father      Heart Disease Father      Lipids Father      Colon Cancer Father      Colon Cancer Maternal Grandmother      Colon Cancer Maternal Uncle          Current Outpatient Medications   Medication Sig Dispense Refill     azithromycin (ZITHROMAX) 250 MG tablet Take 2 tablets (500 mg) by mouth daily for 1 day, THEN 1 tablet (250 mg) daily for 4 days. 6 tablet 0     cetirizine (ZYRTEC) 10 MG tablet Take 1 tablet (10 mg) by mouth daily 90 tablet 1     Esomeprazole Magnesium (NEXIUM) 40 MG PACK Take 1 packet (40 mg) by mouth daily 31 each 3     fluticasone (FLONASE) 50 MCG/ACT nasal spray Spray 2 sprays into both nostrils daily 16 g 11     sildenafil  (REVATIO) 20 MG tablet TAKE 1 TO 3 TABLETS AS NEEDED 1 HOUR BEFORE SEXUAL ACTIVITY FOR ERECTILE DYSFUNCTION. NEVER USE WITH NITROGLYCERIN, TERAZOSIN, OR DOXAZOSIN (Patient not taking: Reported on 5/21/2019) 20 tablet 0     No Known Allergies  Recent Labs   Lab Test 05/21/19  1203 02/17/19  2220 12/08/15  0859   LDL  --   --  112*   HDL  --   --  58   TRIG  --   --  109   ALT  --  59  --    CR 0.66 0.71  --    GFRESTIMATED >90 >90  --    GFRESTBLACK >90 >90  --    POTASSIUM 4.4 4.0  --       BP Readings from Last 3 Encounters:   07/16/20 126/87   06/26/20 127/86   12/09/19 (!) 142/98    Wt Readings from Last 3 Encounters:   07/16/20 101.6 kg (224 lb)   06/26/20 101.2 kg (223 lb)   12/09/19 107.5 kg (237 lb)                    Reviewed and updated as needed this visit by Provider         Review of Systems   Constitutional, HEENT, cardiovascular, pulmonary, gi and gu systems are negative, except as otherwise noted.      Objective    /87 (BP Location: Right arm, Patient Position: Chair, Cuff Size: Adult Large)   Pulse 79   Temp 98.5  F (36.9  C) (Oral)   Wt 101.6 kg (224 lb)   SpO2 98%   BMI 36.15 kg/m    Body mass index is 36.15 kg/m .  Physical Exam   GENERAL: healthy, alert and no distress  EYES: Eyes grossly normal to inspection, PERRL and conjunctivae and sclerae normal  HENT: ear canals and TM's normal, nose and mouth without ulcers or lesions  NECK: no adenopathy, no asymmetry, masses, or scars and thyroid normal to palpation  RESP: lungs clear to auscultation - no rales, rhonchi or wheezes  CV: regular rate and rhythm, normal S1 S2, no S3 or S4, no murmur, click or rub, no peripheral edema and peripheral pulses strong  ABDOMEN: soft, nontender, no hepatosplenomegaly, no masses and bowel sounds normal  MS: no gross musculoskeletal defects noted, no edema  SKIN: no suspicious lesions or rashes  NEURO: Normal strength and tone, mentation intact and speech normal  BACK: no CVA tenderness, no paralumbar  tenderness  PSYCH: mentation appears normal, affect normal/bright  LYMPH: no cervical, supraclavicular, axillary, or inguinal adenopathy    Diagnostic Test Results:  Labs reviewed in Epic  Results for orders placed or performed in visit on 07/16/20   XR Neck Soft Tissue     Status: None    Narrative    NECK SOFT TISSUE 7/16/2020 9:43 AM     HISTORY: Esophageal dysphagia.    COMPARISON: None.      Impression    IMPRESSION: Alignment is normal. No gross vertebral body height loss.  The intervertebral disc spaces appear largely maintained. No  significant endplate osteophytes, particularly anteriorly. The facet  joints appear radiographically within normal limits. The prevertebral  soft tissues appear normal.    SANTOS NICHOLE MD   Results for orders placed or performed in visit on 07/16/20   XR Chest 2 Views     Status: None    Narrative    CHEST TWO VIEWS 7/16/2020 9:43 AM     HISTORY: Esophageal dysphagia.    COMPARISON: 2/17/2019    FINDINGS: Heart size and pulmonary vascularity are within normal  limits. The lungs are clear. No pneumothorax or pleural effusion.       Impression    IMPRESSION: No radiographic evidence of acute chest abnormality.     JOSELUIS DIAZ MD           Assessment & Plan       ICD-10-CM    1. Esophageal dysphagia  R13.10 XR Chest 2 Views     XR Neck Soft Tissue   2. Atypical pneumonia  J18.9 azithromycin (ZITHROMAX) 250 MG tablet      patient continues to have worries and concerns about complications from COVID virus infection  No sign of soft tissue swelling .  Small streak in posterior lung fileds likely atelectasis or residual from lung infection  However, with symptoms persistent this long, will consider atypical pneumonia      Regular exercise    No follow-ups on file.    Grabiel Bajwa MD  [facility]

## 2020-08-12 ENCOUNTER — TELEPHONE (OUTPATIENT)
Dept: FAMILY MEDICINE | Facility: CLINIC | Age: 40
End: 2020-08-12

## 2020-08-12 NOTE — TELEPHONE ENCOUNTER
Reason for Call:  Other     Detailed comments: patient has some follow up questions from last appointment on 7/16/20 (swollowing issues)    Phone Number Patient can be reached at: Home number on file [phone] (home)    Best Time: any    Can we leave a detailed message on this number? YES    Call taken on 8/12/2020 at 8:00 AM by Carmen Jerez

## 2020-08-13 NOTE — TELEPHONE ENCOUNTER
patient returned call - nurse picked up. Slight pains in back and in front of stomach. Is afraid organs were messed up from the covid from a couple months ago?    Patient requesting scheduling a physical - states has been 1.5 years since last physical.   Screening Covid negative - scheduled physical 9/2      Do you have:     Fever >100.0 -No  New cough -No    Shortness of breath-No    Chills-No    New loss of taste or smell-No    Generalized body aches-No    New persistent headache-No    New sore throat-No - has allergy    New rash-No    Nausea, vomiting or diarrhea      Within the past 3 weeks, have you been exposed to someone with a known positive COVID 19 test?-No    Have you traveled anywhere?-No          María Lr RN  [facility]

## 2020-08-13 NOTE — TELEPHONE ENCOUNTER
Attempt # 1  Called patient at home number.[phone]   Was call answered?  No answer, left message to call nurse line at [phone]          María Lr RN  [facility]

## 2020-08-14 ENCOUNTER — OFFICE VISIT (OUTPATIENT)
Dept: FAMILY MEDICINE | Facility: CLINIC | Age: 40
End: 2020-08-14
Payer: COMMERCIAL

## 2020-08-14 VITALS
BODY MASS INDEX: 35.52 KG/M2 | DIASTOLIC BLOOD PRESSURE: 81 MMHG | HEART RATE: 75 BPM | WEIGHT: 221 LBS | SYSTOLIC BLOOD PRESSURE: 127 MMHG | HEIGHT: 66 IN

## 2020-08-14 DIAGNOSIS — K21.00 GASTROESOPHAGEAL REFLUX DISEASE WITH ESOPHAGITIS: ICD-10-CM

## 2020-08-14 DIAGNOSIS — R10.13 ABDOMINAL PAIN, EPIGASTRIC: Primary | ICD-10-CM

## 2020-08-14 DIAGNOSIS — R10.33 PERIUMBILICAL ABDOMINAL PAIN: ICD-10-CM

## 2020-08-14 DIAGNOSIS — R09.89 THROAT TIGHTNESS: ICD-10-CM

## 2020-08-14 LAB
ALBUMIN SERPL-MCNC: 3.8 G/DL (ref 3.4–5)
ALBUMIN UR-MCNC: NEGATIVE MG/DL
ALP SERPL-CCNC: 57 U/L (ref 40–150)
ALT SERPL W P-5'-P-CCNC: 34 U/L (ref 0–70)
ANION GAP SERPL CALCULATED.3IONS-SCNC: 5 MMOL/L (ref 3–14)
APPEARANCE UR: CLEAR
AST SERPL W P-5'-P-CCNC: 12 U/L (ref 0–45)
BACTERIA #/AREA URNS HPF: ABNORMAL /HPF
BASOPHILS # BLD AUTO: 0 10E9/L (ref 0–0.2)
BASOPHILS NFR BLD AUTO: 0.2 %
BILIRUB SERPL-MCNC: 0.3 MG/DL (ref 0.2–1.3)
BILIRUB UR QL STRIP: NEGATIVE
BUN SERPL-MCNC: 9 MG/DL (ref 7–30)
CALCIUM SERPL-MCNC: 9.2 MG/DL (ref 8.5–10.1)
CHLORIDE SERPL-SCNC: 106 MMOL/L (ref 94–109)
CO2 SERPL-SCNC: 27 MMOL/L (ref 20–32)
COLOR UR AUTO: YELLOW
CREAT SERPL-MCNC: 0.57 MG/DL (ref 0.66–1.25)
CRP SERPL-MCNC: <2.9 MG/L (ref 0–8)
DIFFERENTIAL METHOD BLD: NORMAL
EOSINOPHIL # BLD AUTO: 0 10E9/L (ref 0–0.7)
EOSINOPHIL NFR BLD AUTO: 0.4 %
ERYTHROCYTE [DISTWIDTH] IN BLOOD BY AUTOMATED COUNT: 14.5 % (ref 10–15)
GFR SERPL CREATININE-BSD FRML MDRD: >90 ML/MIN/{1.73_M2}
GLUCOSE SERPL-MCNC: 93 MG/DL (ref 70–99)
GLUCOSE UR STRIP-MCNC: NEGATIVE MG/DL
HCT VFR BLD AUTO: 41.8 % (ref 40–53)
HGB BLD-MCNC: 14.4 G/DL (ref 13.3–17.7)
HGB UR QL STRIP: NEGATIVE
KETONES UR STRIP-MCNC: NEGATIVE MG/DL
LEUKOCYTE ESTERASE UR QL STRIP: ABNORMAL
LIPASE SERPL-CCNC: 52 U/L (ref 73–393)
LYMPHOCYTES # BLD AUTO: 2.1 10E9/L (ref 0.8–5.3)
LYMPHOCYTES NFR BLD AUTO: 23.7 %
MCH RBC QN AUTO: 27 PG (ref 26.5–33)
MCHC RBC AUTO-ENTMCNC: 34.4 G/DL (ref 31.5–36.5)
MCV RBC AUTO: 78 FL (ref 78–100)
MONOCYTES # BLD AUTO: 0.8 10E9/L (ref 0–1.3)
MONOCYTES NFR BLD AUTO: 8.3 %
NEUTROPHILS # BLD AUTO: 6.1 10E9/L (ref 1.6–8.3)
NEUTROPHILS NFR BLD AUTO: 67.4 %
NITRATE UR QL: NEGATIVE
NON-SQ EPI CELLS #/AREA URNS LPF: ABNORMAL /LPF
PH UR STRIP: 7 PH (ref 5–7)
PLATELET # BLD AUTO: 276 10E9/L (ref 150–450)
POTASSIUM SERPL-SCNC: 4 MMOL/L (ref 3.4–5.3)
PROT SERPL-MCNC: 8 G/DL (ref 6.8–8.8)
RBC # BLD AUTO: 5.34 10E12/L (ref 4.4–5.9)
RBC #/AREA URNS AUTO: ABNORMAL /HPF
SODIUM SERPL-SCNC: 138 MMOL/L (ref 133–144)
SOURCE: ABNORMAL
SP GR UR STRIP: 1.01 (ref 1–1.03)
UROBILINOGEN UR STRIP-ACNC: 0.2 EU/DL (ref 0.2–1)
WBC # BLD AUTO: 9 10E9/L (ref 4–11)
WBC #/AREA URNS AUTO: ABNORMAL /HPF

## 2020-08-14 PROCEDURE — 85025 COMPLETE CBC W/AUTO DIFF WBC: CPT | Performed by: NURSE PRACTITIONER

## 2020-08-14 PROCEDURE — 81001 URINALYSIS AUTO W/SCOPE: CPT | Performed by: NURSE PRACTITIONER

## 2020-08-14 PROCEDURE — 83690 ASSAY OF LIPASE: CPT | Performed by: NURSE PRACTITIONER

## 2020-08-14 PROCEDURE — 99214 OFFICE O/P EST MOD 30 MIN: CPT | Performed by: NURSE PRACTITIONER

## 2020-08-14 PROCEDURE — 80053 COMPREHEN METABOLIC PANEL: CPT | Performed by: NURSE PRACTITIONER

## 2020-08-14 PROCEDURE — 36415 COLL VENOUS BLD VENIPUNCTURE: CPT | Performed by: NURSE PRACTITIONER

## 2020-08-14 PROCEDURE — 86140 C-REACTIVE PROTEIN: CPT | Performed by: NURSE PRACTITIONER

## 2020-08-14 RX ORDER — ESOMEPRAZOLE MAGNESIUM 40 MG/1
40 GRANULE, DELAYED RELEASE ORAL DAILY
Qty: 31 EACH | Refills: 3 | Status: SHIPPED | OUTPATIENT
Start: 2020-08-14 | End: 2020-12-09 | Stop reason: ALTCHOICE

## 2020-08-14 ASSESSMENT — MIFFLIN-ST. JEOR: SCORE: 1845.2

## 2020-08-14 NOTE — PATIENT INSTRUCTIONS
Recommend re-starting nexium daily.     Patient Education     How Acid Reflux Affects Your Throat    Do you have to clear your throat or cough often? Are you hoarse? Do you have trouble swallowing? If you have these or other throat symptoms, you may have acid reflux. This occurs when stomach acid flows back up and irritates your throat.  Why you have throat symptoms  There are muscles (esophageal sphincters) at both ends of the tube that carries food to your stomach (the esophagus). These muscles relax to let food pass. Then they tighten to keep stomach acid down. When the lower esophageal sphincter (LES) doesn t tighten enough, acid can flow back (reflux) from your stomach into your esophagus. This may cause heartburn. In some cases the upper esophageal sphincter (UES) also doesn t work well. Then acid can travel higher and enter your throat (pharynx). In many cases, this causes throat symptoms.  Common throat symptoms    Need to clear your throat often    Feeling like you re choking    Long-term (chronic) cough    Hoarseness    Trouble swallowing    Feel like you have a lump in your throat    Sour or acid taste    Sore throat that keeps coming back   Date Last Reviewed: 7/1/2016 2000-2019 [employer]. [address] [place] [id number]. All rights reserved. This information is not intended as a substitute for professional medical care. Always follow your healthcare professional's instructions.         Patient Education     Tips to Control Acid Reflux    To control acid reflux, you ll need to make some basic diet and lifestyle changes. The simple steps outlined below may be all you ll need to ease discomfort.  Watch what you eat    Avoid fatty foods and spicy foods.    Eat fewer acidic foods, such as citrus and tomato-based foods. These can increase symptoms.    Limit drinking alcohol, caffeine, and fizzy beverages. All increase acid reflux.    Try limiting chocolate, peppermint, and  spearmint. These can worsen acid reflux in some people.  Watch when you eat    Avoid lying down for 3 hours after eating.    Do not snack before going to bed.  Raise your head  Raising your head and upper body by 4 to 6 inches helps limit reflux when you re lying down. Put blocks under the head of your bed frame to raise it.  Other changes    Lose weight, if you need to    Don t exercise near bedtime    Avoid tight-fitting clothes    Limit aspirin and ibuprofen    Stop smoking   Date Last Reviewed: 7/1/2016 2000-2019 [employer]. [address] [place] [address], [place] [address]. All rights reserved. This information is not intended as a substitute for professional medical care. Always follow your healthcare professional's instructions.

## 2020-08-14 NOTE — PROGRESS NOTES
Subjective     Isacc Wang is a 40 year old male who presents to clinic today for the following health issues:    HPI       Stomach issues     Onset: 2 weeks ago    Description:   Character: tightness of throat, burning sensation of stomach   Location: epigastric region  Radiation:  Back    Intensity: mild 6/10.    Progression of Symptoms:  same    Accompanying Signs & Symptoms:  Fever/Chills?: no   Gas/Bloating: YES  Nausea: no   Vomitting: no   Diarrhea?: no   Constipation:no   Dysuria or Hematuria: frequency due to fluid intake   History:   Trauma: no   Previous similar pain: no    Previous tests done: Colonoscopy Hx of polyp. [place]    Precipitating factors:   Does the pain change with:     Food:     BM: YES slightly better    Urination: no     Therapies Tried and outcome:  apple cider vinegar and Vitamin C     LMP:  not applicable     Was on Zantac in the pass for acid reflux however this was recalled. He was prescribed nexium earlier this summer, he is unsure if this helped or not. hes also taken tums.     He has noticed recent tightness to his throat with difficulty swallowing medications recently. He has also had a hot/burning sensation intermittently to his stomach. He adds he has an acid taste in his mouth. History of stomach ulcer in the past. He has tried an antihistamine and flonase for his throat symptoms in the past. He has also tried gargling with salt water.     He reports having had covid-19 2.5 months ago. After that he started drinking apple cider vinegar to help detox.    He was evaluated for similar symptoms both on 6/26/20 and 7/16/20. During these office visits he had both a chest and neck x-ray and was prescribed zithromax for possible pneumonia. He is unsure if the antibiotic has helped. He is a non-smoker. He reports have 6 family deaths over the past 2 years. He reports vomiting small amount of acid last night. He also had a dark green stool last night. Denies chest pain. He  "takes aspirin and aleve as needed. He states his symptoms are flared by fried foods, seasoned foods and spicy food. Denies signs of blood in his vomit or stools. He has also tried pepto bismal.       Current Outpatient Medications   Medication Sig Dispense Refill     cetirizine (ZYRTEC) 10 MG tablet Take 1 tablet (10 mg) by mouth daily 90 tablet 1     Esomeprazole Magnesium (NEXIUM) 40 MG PACK Take 1 packet (40 mg) by mouth daily 31 each 3     fluticasone (FLONASE) 50 MCG/ACT nasal spray Spray 2 sprays into both nostrils daily 16 g 11     sildenafil (REVATIO) 20 MG tablet TAKE 1 TO 3 TABLETS AS NEEDED 1 HOUR BEFORE SEXUAL ACTIVITY FOR ERECTILE DYSFUNCTION. NEVER USE WITH NITROGLYCERIN, TERAZOSIN, OR DOXAZOSIN 20 tablet 0     Allergies   Allergen Reactions     Seasonal Allergies        Reviewed and updated as needed this visit by Provider         Review of Systems   Constitutional, cardiovascular, pulmonary, and gu systems are negative, except throat symptoms, abdominal pain       Objective    /81   Pulse 75   Ht 1.676 m (5' 6\")   Wt 100.2 kg (221 lb)   BMI 35.67 kg/m    Body mass index is 35.67 kg/m .  Physical Exam   GENERAL: healthy, alert and no distress  EYES: Eyes grossly normal to inspection, and conjunctivae and sclerae normal  HENT: nose and mouth without ulcers or lesions  NECK: no adenopathy, no asymmetry, masses, or scars and thyroid normal to palpation  RESP: lungs clear to auscultation - no rales, rhonchi or wheezes  CV: regular rate and rhythm, normal S1 S2, no S3 or S4, no murmur, click or rub, no peripheral edema and peripheral pulses strong  ABDOMEN: soft, no hepatosplenomegaly, no masses and bowel sounds normal. Epigastric and periumbilical tenderness noted   SKIN: no suspicious lesions or rashes  NEURO: mentation intact and speech normal  PSYCH: mentation appears normal, affect normal/bright      Assessment & Plan     1. Abdominal pain, epigastric  Will check labs, stool, urine and get " abdominal/pelvic CT to further assess. Also ordered EGD. Recommended that pt re-start nexium and avoid food triggers. Also discussed avoiding NSAIDs.   - Helicobacter pylori Antigen Stool; Future  - Comprehensive metabolic panel (BMP + Alb, Alk Phos, ALT, AST, Total. Bili, TP)  - Lipase  - CRP, inflammation  - CBC with platelets and differential  - GASTROENTEROLOGY ADULT REF PROCEDURE ONLY; Future  - CT Abdomen Pelvis w Contrast; Future  - Urine Microscopic    2. Periumbilical abdominal pain  See above   - *UA reflex to Microscopic and Culture ([place] [facility] (except [place] and [place])  - Helicobacter pylori Antigen Stool; Future  - Comprehensive metabolic panel (BMP + Alb, Alk Phos, ALT, AST, Total. Bili, TP)  - CRP, inflammation  - CBC with platelets and differential  - CT Abdomen Pelvis w Contrast; Future    3. Gastroesophageal reflux disease with esophagitis  See above   - Esomeprazole Magnesium (NEXIUM) 40 MG PACK; Take 1 packet (40 mg) by mouth daily  Dispense: 31 each; Refill: 3    4. Throat tightness  Question if related to acid reflux. Has tried flonase, antihistamine and acid reducing medication. Also had neck x-ray performed within the past few months. Will refer to ENT for further evaluation. No acute throat abnormalities seen today that would warrant immediate referral or ED evaluation.   - OTOLARYNGOLOGY REFERRAL       Return in about 2 weeks (around 8/28/2020) for If symptoms worsen or fail to improve.    Smitha Rascon NP  [facility]

## 2020-08-16 PROCEDURE — 87338 HPYLORI STOOL AG IA: CPT | Performed by: NURSE PRACTITIONER

## 2020-08-17 DIAGNOSIS — R10.33 PERIUMBILICAL ABDOMINAL PAIN: ICD-10-CM

## 2020-08-17 DIAGNOSIS — R10.13 ABDOMINAL PAIN, EPIGASTRIC: ICD-10-CM

## 2020-08-18 ENCOUNTER — TELEPHONE (OUTPATIENT)
Dept: FAMILY MEDICINE | Facility: CLINIC | Age: 40
End: 2020-08-18

## 2020-08-18 LAB — H PYLORI AG STL QL IA: NEGATIVE

## 2020-08-18 NOTE — TELEPHONE ENCOUNTER
Attempt #1 to call patient.     Patient did not answer, RN left voicemail at home number. RN requested patient return call to Nurse Triage line at [phone].     Jenni Justice RN, BSN, PHN  [facility]: [place]

## 2020-08-18 NOTE — TELEPHONE ENCOUNTER
Please call pt to let him know that his urine showed very few bacteria (likely contamination).  Lipase was slightly decreased otherwise labs were normal. Continue with plan to get abdominal/pelvic CT to further assess persistent symptoms.

## 2020-08-19 NOTE — TELEPHONE ENCOUNTER
RN called patient and relayed provider's message. Patient verbalized understanding.     He states he was seen at hospital last night, was treated and feel better.    Jenni Justice RN, BSN, PHN  [facility]: [place]

## 2020-08-26 ENCOUNTER — HOSPITAL ENCOUNTER (OUTPATIENT)
Dept: CT IMAGING | Facility: CLINIC | Age: 40
Discharge: HOME | End: 2020-08-26
Attending: NURSE PRACTITIONER | Admitting: NURSE PRACTITIONER
Payer: COMMERCIAL

## 2020-08-26 DIAGNOSIS — R10.13 ABDOMINAL PAIN, EPIGASTRIC: ICD-10-CM

## 2020-08-26 DIAGNOSIS — R10.33 PERIUMBILICAL ABDOMINAL PAIN: ICD-10-CM

## 2020-08-26 PROCEDURE — 25000125 ZZHC RX 250: Performed by: NURSE PRACTITIONER

## 2020-08-26 PROCEDURE — 25000128 H RX IP 250 OP 636: Performed by: NURSE PRACTITIONER

## 2020-08-26 PROCEDURE — 74177 CT ABD & PELVIS W/CONTRAST: CPT

## 2020-08-26 RX ORDER — IOPAMIDOL 755 MG/ML
108 INJECTION, SOLUTION INTRAVASCULAR ONCE
Status: COMPLETED | OUTPATIENT
Start: 2020-08-26 | End: 2020-08-26

## 2020-08-26 RX ADMIN — IOPAMIDOL 108 ML: 755 INJECTION, SOLUTION INTRAVENOUS at 07:32

## 2020-08-26 RX ADMIN — SODIUM CHLORIDE 71 ML: 9 INJECTION, SOLUTION INTRAVENOUS at 07:32

## 2020-08-27 ENCOUNTER — HOSPITAL ENCOUNTER (EMERGENCY)
Facility: CLINIC | Age: 40
Discharge: HOME | End: 2020-08-27
Attending: EMERGENCY MEDICINE | Admitting: EMERGENCY MEDICINE
Payer: COMMERCIAL

## 2020-08-27 ENCOUNTER — APPOINTMENT (OUTPATIENT)
Dept: GENERAL RADIOLOGY | Facility: CLINIC | Age: 40
End: 2020-08-27
Attending: EMERGENCY MEDICINE
Payer: COMMERCIAL

## 2020-08-27 VITALS
HEART RATE: 80 BPM | RESPIRATION RATE: 12 BRPM | DIASTOLIC BLOOD PRESSURE: 105 MMHG | OXYGEN SATURATION: 96 % | TEMPERATURE: 98 F | SYSTOLIC BLOOD PRESSURE: 140 MMHG

## 2020-08-27 DIAGNOSIS — M54.2 NECK PAIN: ICD-10-CM

## 2020-08-27 DIAGNOSIS — K21.9 GASTROESOPHAGEAL REFLUX DISEASE WITHOUT ESOPHAGITIS: ICD-10-CM

## 2020-08-27 DIAGNOSIS — G44.209 TENSION HEADACHE: ICD-10-CM

## 2020-08-27 LAB
ANION GAP SERPL CALCULATED.3IONS-SCNC: 6 MMOL/L (ref 3–14)
BASOPHILS # BLD AUTO: 0 10E9/L (ref 0–0.2)
BASOPHILS NFR BLD AUTO: 0.3 %
BUN SERPL-MCNC: 8 MG/DL (ref 7–30)
CALCIUM SERPL-MCNC: 9.4 MG/DL (ref 8.5–10.1)
CHLORIDE SERPL-SCNC: 110 MMOL/L (ref 94–109)
CK SERPL-CCNC: 144 U/L (ref 30–300)
CO2 SERPL-SCNC: 25 MMOL/L (ref 20–32)
CREAT SERPL-MCNC: 0.62 MG/DL (ref 0.66–1.25)
CRP SERPL-MCNC: <2.9 MG/L (ref 0–8)
DIFFERENTIAL METHOD BLD: ABNORMAL
EOSINOPHIL # BLD AUTO: 0.1 10E9/L (ref 0–0.7)
EOSINOPHIL NFR BLD AUTO: 0.5 %
ERYTHROCYTE [DISTWIDTH] IN BLOOD BY AUTOMATED COUNT: 13.9 % (ref 10–15)
GFR SERPL CREATININE-BSD FRML MDRD: >90 ML/MIN/{1.73_M2}
GLUCOSE SERPL-MCNC: 95 MG/DL (ref 70–99)
HCT VFR BLD AUTO: 43.7 % (ref 40–53)
HGB BLD-MCNC: 14.9 G/DL (ref 13.3–17.7)
IMM GRANULOCYTES # BLD: 0 10E9/L (ref 0–0.4)
IMM GRANULOCYTES NFR BLD: 0.2 %
INTERPRETATION ECG - MUSE: NORMAL
LYMPHOCYTES # BLD AUTO: 2.5 10E9/L (ref 0.8–5.3)
LYMPHOCYTES NFR BLD AUTO: 27.3 %
MCH RBC QN AUTO: 27.2 PG (ref 26.5–33)
MCHC RBC AUTO-ENTMCNC: 34.1 G/DL (ref 31.5–36.5)
MCV RBC AUTO: 80 FL (ref 78–100)
MONOCYTES # BLD AUTO: 0.9 10E9/L (ref 0–1.3)
MONOCYTES NFR BLD AUTO: 10.1 %
NEUTROPHILS # BLD AUTO: 5.7 10E9/L (ref 1.6–8.3)
NEUTROPHILS NFR BLD AUTO: 61.6 %
NRBC # BLD AUTO: 0.1 10*3/UL
NRBC BLD AUTO-RTO: 2 /100
PLATELET # BLD AUTO: 310 10E9/L (ref 150–450)
POTASSIUM SERPL-SCNC: 3.7 MMOL/L (ref 3.4–5.3)
RBC # BLD AUTO: 5.47 10E12/L (ref 4.4–5.9)
SODIUM SERPL-SCNC: 141 MMOL/L (ref 133–144)
TROPONIN I SERPL-MCNC: <0.015 UG/L (ref 0–0.04)
WBC # BLD AUTO: 9.2 10E9/L (ref 4–11)

## 2020-08-27 PROCEDURE — 86140 C-REACTIVE PROTEIN: CPT | Performed by: EMERGENCY MEDICINE

## 2020-08-27 PROCEDURE — 84484 ASSAY OF TROPONIN QUANT: CPT | Performed by: EMERGENCY MEDICINE

## 2020-08-27 PROCEDURE — 82550 ASSAY OF CK (CPK): CPT | Performed by: EMERGENCY MEDICINE

## 2020-08-27 PROCEDURE — 80048 BASIC METABOLIC PNL TOTAL CA: CPT | Performed by: EMERGENCY MEDICINE

## 2020-08-27 PROCEDURE — 99285 EMERGENCY DEPT VISIT HI MDM: CPT | Mod: 25

## 2020-08-27 PROCEDURE — 71046 X-RAY EXAM CHEST 2 VIEWS: CPT

## 2020-08-27 PROCEDURE — 85025 COMPLETE CBC W/AUTO DIFF WBC: CPT | Performed by: EMERGENCY MEDICINE

## 2020-08-27 PROCEDURE — 93005 ELECTROCARDIOGRAM TRACING: CPT

## 2020-08-27 RX ORDER — IBUPROFEN 600 MG/1
600 TABLET, FILM COATED ORAL EVERY 8 HOURS PRN
Qty: 30 TABLET | Refills: 0 | Status: SHIPPED | OUTPATIENT
Start: 2020-08-27 | End: 2020-12-09

## 2020-08-27 RX ORDER — METHOCARBAMOL 750 MG/1
750 TABLET, FILM COATED ORAL
Qty: 30 TABLET | Refills: 0 | Status: SHIPPED | OUTPATIENT
Start: 2020-08-27 | End: 2020-12-09

## 2020-08-27 ASSESSMENT — ENCOUNTER SYMPTOMS
CHEST TIGHTNESS: 0
SHORTNESS OF BREATH: 0
NECK PAIN: 1

## 2020-08-27 NOTE — ED TRIAGE NOTES
Patient presents to ED with reports of burning sensation in his chest and right sided neck pain. Pt has hx of GERD, taking medications for it as well as other GI medications. Pt denies SOB. Pt had an outpatient CT scan yesterday here, awaiting results. Patient reports he is not feeling any better and did not sleep d/t his s/sx. Patient reports he is under a lot of stress and sadness due to multiple recent familial deaths, patient tearful in triage.

## 2020-08-27 NOTE — DISCHARGE INSTRUCTIONS
Stop the Flexeril  You can take Robaxin at night  600 mg of ibuprofen 3 times a day  Restart your esomeprazole

## 2020-08-27 NOTE — ED PROVIDER NOTES
History     Chief Complaint:  Neck Pain      HPI   Isacc Wnag is a 40 year old male, with a history of un medicated hypertension and hypercholesterolemia, who presents with neck soreness. The patient states he has been taking muscle relaxer's for about 2 weeks due to arm and neck muscle spasms.  The patient states the pain radiates from his neck upward towards his head. The patient states he has been under a lot of stress due to three familial deaths this year. The patient denies chest pain, shortness of breath, and chest tightness.  The patient was here for a CT scan of the abdomen and pelvis yesterday, results below.  Patient does have a long history of gastroesophageal reflux disease, he has been taking Carafate for this recently and he has stopped his Nexium as he did not know if it was helpful.  He has not had any significant ibuprofen as he is concerned that this could have side effects given that we are in the COVID-19 pandemic.  Has been quite anxious about his symptoms.  The patient does not smoke tobacco.  He is not using illicit drugs.      CT Abdomen/Pelvis with IV contrast: 08/26/2020, per radiology.   Negative CT of the abdomen and pelvis. No specific finding  to explain the patient's pain.    Allergies:  No known drug allergies      Medications:    Zyrtec  Flonase  Revatio  Flexeril    cetirizine (ZYRTEC) 10 MG tablet  fluticasone (FLONASE) 50 MCG/ACT nasal spray  sildenafil (REVATIO) 20 MG tablet    carafete before meals and at bedtime 4 times a day  flexoril muscle relaxer      Past Medical History:    GERD  Hyperlipidemia  Obesity      Past Surgical History:    Cyst removed      Family History:    Hypertension  Cervical Cancer  Depression   Thyroid disease  Diabetes  Cerebrovascular disease  Lipids  Heart disease       Social History:  Smoking status: Never  Alcohol use: Not currently  Drug use: No  PCP: Physician No Ref-Primary  Marital Status:  Single [1]       Review of Systems    Respiratory: Negative for chest tightness and shortness of breath.    Cardiovascular: Negative for chest pain.   Musculoskeletal: Positive for neck pain.   All other systems reviewed and are negative.    Physical Exam     Patient Vitals for the past 24 hrs:   BP Temp Temp src Pulse Resp SpO2   08/27/20 0701 -- -- -- 80 12 96 %   08/27/20 0700 (!) 140/105 -- -- 87 16 96 %   08/27/20 0649 (!) 151/92 98  F (36.7  C) Temporal 85 18 99 %       Physical Exam  General: Resting comfortably on the [employer], evaded BMI  Head:  The scalp, face, and head appear normal  Eyes:  The pupils are equal, round, and reactive to light    There is no nystagmus    Extraocular muscles are intact    Conjunctivae and sclerae are normal  ENT:    The nose is normal    Pinnae are normal    The oropharynx is normal    Patient's tongue is normal, there were no lesions or infection    uvula is in the midline  Neck:  Normal range of motion    Patient has bilateral paracervical muscular tension, this extends into the suboccipital musculature, he does feel a sensation of tenseness in his neck that causes some in the posterior occipital scalp.  This is likely consistent with tension type headache.    There is no rigidity noted    There is no midline cervical spine pain/tenderness    Trachea is in the midline    No mass is detected  CV:  Regular rate and underlying rhythm     Normal S1/S2, no S3/S4    No pathological murmur detected  Resp:  Lungs are clear    There is no tachypnea    Non-labored    No rales    No wheezing   GI:  Abdomen is soft, there is no rigidity    No distension    No tympani    No rebound tenderness     Non-surgical without peritoneal features  MS:  Normal muscular tone    Symmetric motor strength    No major joint effusions    No asymmetric leg swelling, no calf tenderness  Skin:  No rash or acute skin lesions noted  Neuro: Speech is normal and fluent  Psych:  Awake. Alert.      Normal affect.  Appropriate  interactions.  Lymph: No anterior cervical lymphadenopathy noted              Emergency Department Course   ECG (06:50:57):  Rate 87 bpm. IL interval 134. QRS duration 88. QT/QTc 344/413. P-R-T axes 43 -1 45. Sinus rhythm. Minimal voltage criteria for LVH, may be normal variant. Borderline ECG. When compared with ECG of 17- feb-2019 Nonspecific T wave abnormality has replaced inverted T waves in Inferior leads.  Interpreted at 0655 by Pepe Sánchez MD.    Imaging:  Radiology findings were communicated with the patient who voiced understanding of the findings.    XR Chest, 2 view:  PA and lateral views of the chest. Lungs are clear. Heart   is normal in size. No effusions are evident. No pneumothorax.         Laboratory:  Laboratory findings were communicated with the patient who voiced understanding of the findings.    CBC:  AWNL. (WBC 9.2, HGB 14.9, )     BMP: Chloride 110 (H), Creatinine 0.62 (L)    Troponin (Collected 0705): <0.015    CK Total: 144    CRP Inflammation: <2.9      Emergency Department Course:  Past medical records, nursing notes, and vitals reviewed.    0702 I performed an exam of the patient as documented above.     EKG obtained in the ED, see results above.   IV was inserted and blood was drawn for laboratory testing, results above.  The patient was sent for a Chest XR while in the emergency department, results above.     1024 I rechecked the patient and discussed the results of his workup thus far.     Findings and plan explained to the Patient. Patient discharged home with instructions regarding supportive care, medications, and reasons to return. The importance of close follow-up was reviewed.     I personally reviewed the laboratory and imaging results with the Patient and answered all related questions prior to discharge.     Impression & Plan     Medical Decision Making:  Patient presents with numerous symptoms as noted above.  He has muscle tension in his neck.  Low-grade  posterior scalp region headache.  Upset stomach.  Insomnia at least last evening.  And he notes that he has been under significant stress and has had grief.  Patient was also concerned that he might have a tongue infection.  Notes that he has been googling symptoms quite a bit.    Work-up of the patient demonstrates a negative CT scan of the abdomen from yesterday, a normal chest x-ray today.  Normal blood work including a screening troponin.  I do believe this patient is having some reflux symptoms.  He is having some mild insomnia and tension headache.  No indication of migraine.  No indication of neuro imaging at this point of the brain.  Patient will be started on ibuprofen, he has been reluctant to take this as he was afraid it could put him at increased risk for COVID-19 infection.  He has not been taking his ease omeprazole, I have encouraged him to restart this.  The patient will be given a different muscle relaxer mainly to be used at nighttime to help him sleep and to help with the muscle tension in his neck.    No  life-threatening etiologies are detected and the patient is safe for discharge.      Diagnosis:    ICD-10-CM    1. Neck pain  M54.2    2. Tension headache  G44.209        Disposition:  Discharged to home.    Discharge Medications:  Discharge Medication List as of 8/27/2020 10:35 AM      START taking these medications    Details   ibuprofen (ADVIL/MOTRIN) 600 MG tablet Take 1 tablet (600 mg) by mouth every 8 hours as needed for moderate pain, Disp-30 tablet,R-0, Local Print      methocarbamol (ROBAXIN) 750 MG tablet Take 1 tablet (750 mg) by mouth nightly as needed for muscle spasms or other (headache,sleep), Disp-30 tablet,R-0, Local Print             Scribe Disclosure:  I, Teo Manzano, am serving as a scribe at 7:02 AM on 8/27/2020 to document services personally performed by Pepe Sánchez MD based on my observations and the provider's statements to me.        Pepe Sánchez,  MD  08/27/20 6056

## 2020-09-08 DIAGNOSIS — Z11.59 ENCOUNTER FOR SCREENING FOR OTHER VIRAL DISEASES: Primary | ICD-10-CM

## 2020-09-09 ENCOUNTER — HOSPITAL ENCOUNTER (OUTPATIENT)
Facility: AMBULATORY SURGERY CENTER | Age: 40
End: 2020-09-09
Attending: INTERNAL MEDICINE
Payer: COMMERCIAL

## 2020-09-17 ENCOUNTER — OFFICE VISIT (OUTPATIENT)
Dept: FAMILY MEDICINE | Facility: CLINIC | Age: 40
End: 2020-09-17
Payer: COMMERCIAL

## 2020-09-17 VITALS
BODY MASS INDEX: 35.19 KG/M2 | DIASTOLIC BLOOD PRESSURE: 62 MMHG | OXYGEN SATURATION: 100 % | WEIGHT: 218 LBS | HEART RATE: 89 BPM | SYSTOLIC BLOOD PRESSURE: 128 MMHG

## 2020-09-17 DIAGNOSIS — K21.00 GASTROESOPHAGEAL REFLUX DISEASE WITH ESOPHAGITIS: ICD-10-CM

## 2020-09-17 DIAGNOSIS — Z00.00 ROUTINE GENERAL MEDICAL EXAMINATION AT A HEALTH CARE FACILITY: Primary | ICD-10-CM

## 2020-09-17 PROCEDURE — U0003 INFECTIOUS AGENT DETECTION BY NUCLEIC ACID (DNA OR RNA); SEVERE ACUTE RESPIRATORY SYNDROME CORONAVIRUS 2 (SARS-COV-2) (CORONAVIRUS DISEASE [COVID-19]), AMPLIFIED PROBE TECHNIQUE, MAKING USE OF HIGH THROUGHPUT TECHNOLOGIES AS DESCRIBED BY CMS-2020-01-R: HCPCS | Performed by: INTERNAL MEDICINE

## 2020-09-17 PROCEDURE — 99396 PREV VISIT EST AGE 40-64: CPT | Performed by: INTERNAL MEDICINE

## 2020-09-17 ASSESSMENT — ENCOUNTER SYMPTOMS
DIZZINESS: 0
CHILLS: 0
COUGH: 0
DIARRHEA: 0
HEMATOCHEZIA: 0
ABDOMINAL PAIN: 0
CONSTIPATION: 0
HEMATURIA: 0

## 2020-09-17 ASSESSMENT — ANXIETY QUESTIONNAIRES
5. BEING SO RESTLESS THAT IT IS HARD TO SIT STILL: MORE THAN HALF THE DAYS
7. FEELING AFRAID AS IF SOMETHING AWFUL MIGHT HAPPEN: MORE THAN HALF THE DAYS
3. WORRYING TOO MUCH ABOUT DIFFERENT THINGS: NEARLY EVERY DAY
2. NOT BEING ABLE TO STOP OR CONTROL WORRYING: NEARLY EVERY DAY
IF YOU CHECKED OFF ANY PROBLEMS ON THIS QUESTIONNAIRE, HOW DIFFICULT HAVE THESE PROBLEMS MADE IT FOR YOU TO DO YOUR WORK, TAKE CARE OF THINGS AT HOME, OR GET ALONG WITH OTHER PEOPLE: VERY DIFFICULT
6. BECOMING EASILY ANNOYED OR IRRITABLE: NEARLY EVERY DAY
1. FEELING NERVOUS, ANXIOUS, OR ON EDGE: NEARLY EVERY DAY
GAD7 TOTAL SCORE: 18

## 2020-09-17 ASSESSMENT — PATIENT HEALTH QUESTIONNAIRE - PHQ9
SUM OF ALL RESPONSES TO PHQ QUESTIONS 1-9: 15
5. POOR APPETITE OR OVEREATING: MORE THAN HALF THE DAYS

## 2020-09-17 NOTE — PROGRESS NOTES
SUBJECTIVE:   CC: Isacc Wang is an 40 year old male who presents for preventative health visit.     Patient has been advised of split billing requirements and indicates understanding: Yes  Healthy Habits:     Getting at least 3 servings of Calcium per day:  Yes    Bi-annual eye exam:  Yes    Dental care twice a year:  Yes    Sleep apnea or symptoms of sleep apnea:  None    Diet:  Low salt    Frequency of exercise:  2-3 days/week    Duration of exercise:  15-30 minutes    Taking medications regularly:  Yes    Medication side effects:  None    PHQ-2 Total Score: 0    Additional concerns today:  No    When laying down and driving feels in the throat      Today's PHQ-2 Score:   PHQ-2 ( 1999 Pfizer) 9/17/2020   Q1: Little interest or pleasure in doing things 0   Q2: Feeling down, depressed or hopeless 0   PHQ-2 Score 0   Q1: Little interest or pleasure in doing things Not at all   Q2: Feeling down, depressed or hopeless Not at all   PHQ-2 Score 0     No smoking   cbd e- cig  Reduce anxiety  Death in the family        Abuse: Current or Past(Physical, Sexual or Emotional)- No  Do you feel safe in your environment? Yes      Social History     Tobacco Use     Smoking status: Never Smoker     Smokeless tobacco: Never Used   Substance Use Topics     Alcohol use: Not Currently     If you drink alcohol do you typically have >3 drinks per day or >7 drinks per week? No    Alcohol Use 9/17/2020   Prescreen: >3 drinks/day or >7 drinks/week? No   Prescreen: >3 drinks/day or >7 drinks/week? -   No flowsheet data found.    Last PSA:   PSA   Date Value Ref Range Status   05/21/2019 0.97 0 - 4 ug/L Final     Comment:     Assay Method:  Chemiluminescence using Siemens Vista analyzer       Reviewed orders with patient. Reviewed health maintenance and updated orders accordingly - Yes  Lab work is in process  Labs reviewed in EPIC  BP Readings from Last 3 Encounters:   09/17/20 128/62   08/27/20 (!) 140/105   08/14/20 127/81    Wt  Readings from Last 3 Encounters:   09/17/20 98.9 kg (218 lb)   08/14/20 100.2 kg (221 lb)   07/16/20 101.6 kg (224 lb)                  Patient Active Problem List   Diagnosis     Hyperlipidemia LDL goal <130     Obesity     Acute idiopathic gout of right foot     Family history of colon cancer     Obesity (BMI 35.0-39.9) with comorbidity (H)     Past Surgical History:   Procedure Laterality Date     C NONSPECIFIC PROCEDURE      cyst removed right ear     COLONOSCOPY WITH CO2 INSUFFLATION N/A 10/26/2017    Procedure: COLONOSCOPY WITH CO2 INSUFFLATION;  COLON SCREEN/ FAMILY HX/ SPECIAL SCREENING FOR MALIGNANT NEOPLASMS/ ZOWNIROWYCZ;  Surgeon: Alfonso Ortega MD;  Location:  OR       Social History     Tobacco Use     Smoking status: Never Smoker     Smokeless tobacco: Never Used   Substance Use Topics     Alcohol use: Not Currently     Family History   Problem Relation Age of Onset     Hypertension Mother      Cancer Mother         cervical cancer     Depression Mother      Thyroid Disease Mother      Diabetes Father      Hypertension Father      Cerebrovascular Disease Father      Depression Father      Heart Disease Father      Lipids Father      Colon Cancer Father      Colon Cancer Maternal Grandmother      Colon Cancer Maternal Uncle          Current Outpatient Medications   Medication Sig Dispense Refill     cetirizine (ZYRTEC) 10 MG tablet Take 1 tablet (10 mg) by mouth daily 90 tablet 1     Esomeprazole Magnesium (NEXIUM) 40 MG PACK Take 1 packet (40 mg) by mouth daily 31 each 3     fluticasone (FLONASE) 50 MCG/ACT nasal spray Spray 2 sprays into both nostrils daily 16 g 11     ibuprofen (ADVIL/MOTRIN) 600 MG tablet Take 1 tablet (600 mg) by mouth every 8 hours as needed for moderate pain 30 tablet 0     methocarbamol (ROBAXIN) 750 MG tablet Take 1 tablet (750 mg) by mouth nightly as needed for muscle spasms or other (headache,sleep) 30 tablet 0     sildenafil (REVATIO) 20 MG tablet TAKE 1 TO 3  TABLETS AS NEEDED 1 HOUR BEFORE SEXUAL ACTIVITY FOR ERECTILE DYSFUNCTION. NEVER USE WITH NITROGLYCERIN, TERAZOSIN, OR DOXAZOSIN 20 tablet 0     Allergies   Allergen Reactions     Seasonal Allergies        Reviewed and updated as needed this visit by clinical staff         Reviewed and updated as needed this visit by Provider            Review of Systems   Constitutional: Negative for chills.   HENT: Negative for congestion.    Respiratory: Negative for cough.    Cardiovascular: Negative for chest pain.   Gastrointestinal: Negative for abdominal pain, constipation, diarrhea and hematochezia.   Genitourinary: Negative for hematuria.   Neurological: Negative for dizziness.     CONSTITUTIONAL: NEGATIVE for fever, chills, change in weight  INTEGUMENTARY/SKIN: NEGATIVE for worrisome rashes, moles or lesions  EYES: NEGATIVE for vision changes or irritation  ENT: NEGATIVE for ear, mouth and throat problems  RESP: NEGATIVE for significant cough or SOB  CV: NEGATIVE for chest pain, palpitations or peripheral edema  GI: NEGATIVE for nausea, abdominal pain, heartburn, or change in bowel habits   male: negative for dysuria, hematuria, decreased urinary stream, erectile dysfunction, urethral discharge  MUSCULOSKELETAL: NEGATIVE for significant arthralgias or myalgia  NEURO: NEGATIVE for weakness, dizziness or paresthesias  PSYCHIATRIC: NEGATIVE for changes in mood or affect    OBJECTIVE:   There were no vitals taken for this visit.    Physical Exam  GENERAL: healthy, alert and no distress  NECK: no adenopathy, no asymmetry, masses, or scars and thyroid normal to palpation  RESP: lungs clear to auscultation - no rales, rhonchi or wheezes  CV: regular rate and rhythm, normal S1 S2, no S3 or S4, no murmur, click or rub, no peripheral edema and peripheral pulses strong  ABDOMEN: soft, nontender, no hepatosplenomegaly, no masses and bowel sounds normal  MS: no gross musculoskeletal defects noted, no edema    Diagnostic Test  "Results:  Labs reviewed in Epic  No results found for any visits on 09/17/20.    ASSESSMENT/PLAN:       ICD-10-CM    1. Routine general medical examination at a health care facility  Z00.00 Asymptomatic COVID-19 Virus (Coronavirus) by PCR     Asymptomatic COVID-19 Virus (Coronavirus) by PCR   2. Gastroesophageal reflux disease with esophagitis  K21.0 Asymptomatic COVID-19 Virus (Coronavirus) by PCR     Asymptomatic COVID-19 Virus (Coronavirus) by PCR       Patient has been advised of split billing requirements and indicates understanding: Yes  COUNSELING:   Reviewed preventive health counseling, as reflected in patient instructions       Regular exercise       Healthy diet/nutrition       Vision screening       Hearing screening       Family planning       Safe sex practices/STD prevention       Consider Hep C screening for patients born between 1945 and 1965       Colon cancer screening    Estimated body mass index is 35.67 kg/m  as calculated from the following:    Height as of 8/14/20: 1.676 m (5' 6\").    Weight as of 8/14/20: 100.2 kg (221 lb).     Weight management plan: Discussed healthy diet and exercise guidelines    He reports that he has never smoked. He has never used smokeless tobacco.    ICD-10-CM    1. Routine general medical examination at a health care facility  Z00.00 Asymptomatic COVID-19 Virus (Coronavirus) by PCR     Asymptomatic COVID-19 Virus (Coronavirus) by PCR   2. Gastroesophageal reflux disease with esophagitis  K21.0 Asymptomatic COVID-19 Virus (Coronavirus) by PCR     Asymptomatic COVID-19 Virus (Coronavirus) by PCR     Adjustment disorder  Consider lexapro discussed risk benefits and therapy  Therapy recommended      Starts to hurt in the   Counseling Resources:  ATP IV Guidelines  Pooled Cohorts Equation Calculator  FRAX Risk Assessment  ICSI Preventive Guidelines  Dietary Guidelines for Americans, 2010  USDA's MyPlate  ASA Prophylaxis  Lung CA Screening    Grabiel Bajwa, " MD  [facility]

## 2020-09-17 NOTE — PATIENT INSTRUCTIONS
Preventive Health Recommendations  Male Ages 40 to 49    Yearly exam:             See your health care provider every year in order to  o   Review health changes.   o   Discuss preventive care.    o   Review your medicines if your doctor has prescribed any.    You should be tested each year for STDs (sexually transmitted diseases) if you re at risk.     Have a cholesterol test every 5 years.     Have a colonoscopy (test for colon cancer) if someone in your family has had colon cancer or polyps before age 50.     After age 45, have a diabetes test (fasting glucose). If you are at risk for diabetes, you should have this test every 3 years.      Talk with your health care provider about whether or not a prostate cancer screening test (PSA) is right for you.    Shots: Get a flu shot each year. Get a tetanus shot every 10 years.     Nutrition:    Eat at least 5 servings of fruits and vegetables daily.     Eat whole-grain bread, whole-wheat pasta and brown rice instead of white grains and rice.     Get adequate Calcium and Vitamin D.     Lifestyle    Exercise for at least 150 minutes a week (30 minutes a day, 5 days a week). This will help you control your weight and prevent disease.     Limit alcohol to one drink per day.     No smoking.     Wear sunscreen to prevent skin cancer.     See your dentist every six months for an exam and cleaning.     1. Consider starting lexapro 10 mg daily as a

## 2020-09-18 LAB
SARS-COV-2 RNA SPEC QL NAA+PROBE: NOT DETECTED
SPECIMEN SOURCE: NORMAL

## 2020-09-18 ASSESSMENT — ANXIETY QUESTIONNAIRES: GAD7 TOTAL SCORE: 18

## 2020-09-21 ENCOUNTER — SURGERY (OUTPATIENT)
Age: 40
End: 2020-09-21
Payer: COMMERCIAL

## 2020-09-21 ENCOUNTER — VIRTUAL VISIT (OUTPATIENT)
Dept: FAMILY MEDICINE | Facility: CLINIC | Age: 40
End: 2020-09-21
Payer: COMMERCIAL

## 2020-09-21 ENCOUNTER — HOSPITAL ENCOUNTER (OUTPATIENT)
Facility: AMBULATORY SURGERY CENTER | Age: 40
Discharge: HOME | End: 2020-09-21
Attending: INTERNAL MEDICINE | Admitting: INTERNAL MEDICINE
Payer: COMMERCIAL

## 2020-09-21 VITALS
OXYGEN SATURATION: 97 % | TEMPERATURE: 98.3 F | DIASTOLIC BLOOD PRESSURE: 90 MMHG | SYSTOLIC BLOOD PRESSURE: 121 MMHG | HEART RATE: 71 BPM | RESPIRATION RATE: 16 BRPM

## 2020-09-21 DIAGNOSIS — K21.00 GASTROESOPHAGEAL REFLUX DISEASE WITH ESOPHAGITIS: Primary | ICD-10-CM

## 2020-09-21 DIAGNOSIS — Z01.818 PRE-OP EXAM: ICD-10-CM

## 2020-09-21 LAB — UPPER GI ENDOSCOPY: NORMAL

## 2020-09-21 PROCEDURE — 43239 EGD BIOPSY SINGLE/MULTIPLE: CPT | Mod: 74

## 2020-09-21 PROCEDURE — 99214 OFFICE O/P EST MOD 30 MIN: CPT | Mod: 95 | Performed by: INTERNAL MEDICINE

## 2020-09-21 PROCEDURE — G8907 PT DOC NO EVENTS ON DISCHARG: HCPCS

## 2020-09-21 PROCEDURE — G8918 PT W/O PREOP ORDER IV AB PRO: HCPCS

## 2020-09-21 RX ORDER — ONDANSETRON 4 MG/1
4 TABLET, ORALLY DISINTEGRATING ORAL EVERY 6 HOURS PRN
Status: DISCONTINUED | OUTPATIENT
Start: 2020-09-21 | End: 2020-09-22 | Stop reason: HOSPADM

## 2020-09-21 RX ORDER — LIDOCAINE 40 MG/G
CREAM TOPICAL
Status: DISCONTINUED | OUTPATIENT
Start: 2020-09-21 | End: 2020-09-22 | Stop reason: HOSPADM

## 2020-09-21 RX ORDER — NALOXONE HYDROCHLORIDE 0.4 MG/ML
.1-.4 INJECTION, SOLUTION INTRAMUSCULAR; INTRAVENOUS; SUBCUTANEOUS
Status: DISCONTINUED | OUTPATIENT
Start: 2020-09-21 | End: 2020-09-22 | Stop reason: HOSPADM

## 2020-09-21 RX ORDER — ONDANSETRON 2 MG/ML
4 INJECTION INTRAMUSCULAR; INTRAVENOUS EVERY 6 HOURS PRN
Status: DISCONTINUED | OUTPATIENT
Start: 2020-09-21 | End: 2020-09-22 | Stop reason: HOSPADM

## 2020-09-21 RX ORDER — DIPHENHYDRAMINE HYDROCHLORIDE 50 MG/ML
INJECTION INTRAMUSCULAR; INTRAVENOUS PRN
Status: DISCONTINUED | OUTPATIENT
Start: 2020-09-21 | End: 2020-09-21 | Stop reason: HOSPADM

## 2020-09-21 RX ORDER — FENTANYL CITRATE 50 UG/ML
INJECTION, SOLUTION INTRAMUSCULAR; INTRAVENOUS PRN
Status: DISCONTINUED | OUTPATIENT
Start: 2020-09-21 | End: 2020-09-21 | Stop reason: HOSPADM

## 2020-09-21 RX ORDER — FLUMAZENIL 0.1 MG/ML
0.2 INJECTION, SOLUTION INTRAVENOUS
Status: SHIPPED | OUTPATIENT
Start: 2020-09-21 | End: 2020-09-21

## 2020-09-21 RX ORDER — ONDANSETRON 2 MG/ML
4 INJECTION INTRAMUSCULAR; INTRAVENOUS
Status: DISCONTINUED | OUTPATIENT
Start: 2020-09-21 | End: 2020-09-22 | Stop reason: HOSPADM

## 2020-09-21 RX ADMIN — DIPHENHYDRAMINE HYDROCHLORIDE 25 MG: 50 INJECTION INTRAMUSCULAR; INTRAVENOUS at 08:36

## 2020-09-21 RX ADMIN — FENTANYL CITRATE 25 MCG: 50 INJECTION, SOLUTION INTRAMUSCULAR; INTRAVENOUS at 08:41

## 2020-09-21 RX ADMIN — FENTANYL CITRATE 50 MCG: 50 INJECTION, SOLUTION INTRAMUSCULAR; INTRAVENOUS at 08:28

## 2020-09-21 RX ADMIN — FENTANYL CITRATE 25 MCG: 50 INJECTION, SOLUTION INTRAMUSCULAR; INTRAVENOUS at 08:33

## 2020-09-21 RX ADMIN — FENTANYL CITRATE 50 MCG: 50 INJECTION, SOLUTION INTRAMUSCULAR; INTRAVENOUS at 08:25

## 2020-09-21 NOTE — PROGRESS NOTES
Pt was not tolerating the upper endoscopy. Dr. Ramos was unable to advance the scope. Additional sedation medications were given and an additional support nurse was called into the room. The patient was unable to tolerate the scope and could not lay still. The patient continued to grab at the scope and move around. We were unable to complete the procedure.

## 2020-09-21 NOTE — PROGRESS NOTES
"Isacc Wang is a 40 year old male who is being evaluated via a billable telephone visit.      The patient has been notified of following:     \"This telephone visit will be conducted via a call between you and your physician/provider. We have found that certain health care needs can be provided without the need for a physical exam.  This service lets us provide the care you need with a short phone conversation.  If a prescription is necessary we can send it directly to your pharmacy.  If lab work is needed we can place an order for that and you can then stop by our lab to have the test done at a later time.    Telephone visits are billed at different rates depending on your insurance coverage. During this emergency period, for some insurers they may be billed the same as an in-person visit.  Please reach out to your insurance provider with any questions.    If during the course of the call the physician/provider feels a telephone visit is not appropriate, you will not be charged for this service.\"    Patient has given verbal consent for Telephone visit?  Yes    What phone number would you like to be contacted at? [phone]    How would you like to obtain your AVS? Mail a copy    Subjective     Isacc Wang is a 40 year old male who presents via phone visit today for the following health issues:    HPI    Talk about the endoscopy procedure he was suppose to have today     Proposed Surgery/ Procedure: EGD  Date of Surgery/ Procedure: pending  Time of Surgery/ Procedure:   Hospital/Surgical Facility:   Surgery Fax Number: Note does not need to be faxed, will be available electronically in Epic.  Primary Physician: No Ref-Primary, Physician  Type of Anesthesia Anticipated: General    Preoperative Questionnaire:   No - Have you ever had a heart attack or stroke?  No - Have you ever had surgery on your heart or blood vessels, such as a stent, coronary (heart) bypass, or surgery on an artery in the head, neck, " heart, or legs?  No - Do you have chest pain when you are physically active?  No - Do you have a history of heart failure?  No - Do you currently have a cold, bronchitis, or symptoms of other respiratory (head and chest) infections?  No - Do you have a cough, shortness of breath, or wheezing?  No - Do you or anyone in your family have a history of blood clots?  No - Do you or anyone in your family have a serious bleeding problem, such as long-lasting bleeding after surgeries or cuts?  No - Have you ever had anemia or been told to take iron pills?  No - Have you had any abnormal blood loss such as black, tarry or bloody stools, or abnormal vaginal bleeding?  No - Have you ever had a blood transfusion?  Yes - Are you willing to have a blood transfusion if it is medically needed before, during, or after your surgery?  No - Have you or anyone in your family ever had problems with anesthesia (sedation for surgery)?  No - Do you have sleep apnea, excessive snoring, or daytime drowsiness?   No - Do you have any artifical heart valves or other implanted medical devices, such as a pacemaker, defibrillator, or continuous glucose monitor?  No - Do you have any artifical joints?  No - Are you allergic to latex?  No - Is there any chance that you may be pregnant?    Patient has a Health Care Directive or Living Will:  NO    New Patient/Transfer of Care    Review of Systems   Constitutional, HEENT, cardiovascular, pulmonary, gi and gu systems are negative, except as otherwise noted.       Objective          Vitals:  No vitals were obtained today due to virtual visit.    healthy, alert and no distress  PSYCH: Alert and oriented times 3; coherent speech, normal   rate and volume, able to articulate logical thoughts, able   to abstract reason, no tangential thoughts, no hallucinations   or delusions  His affect is normal  RESP: No cough, no audible wheezing, able to talk in full sentences  Remainder of exam unable to be completed  due to telephone visits    Results for orders placed or performed during the hospital encounter of 09/21/20   UPPER GI ENDOSCOPY     Status: None   Result Value Ref Range    Upper GI Endoscopy       [facility]  Endoscopy Department-[place]  _______________________________________________________________________________  Patient Name: Isacc Wang         Procedure Date: 9/21/2020 8:11 AM  MRN: [id number]                       YOB: 1980  Admit Type: Outpatient                Age: 40  Gender: Male                          Note Status: Finalized  Attending MD: Sisi Palma MD  Instrument Name: GIF- [id number]  _______________________________________________________________________________     Procedure:                Attempted Upper GI endoscopy  Indications:              Epigastric abdominal pain, Dysphagia  Providers:                Sisi Palma MD, Idris Valentine MD:             Smitha Rascon  Medicines:                Fentanyl 150 micrograms IV, Midazolam 6 mg IV,                             Diphenhydramine 25 mg IV  Procedure:                After obtaining informed consent, the endoscope was                              passed under direct vision. Throughout the                             procedure, the patient's blood pressure, pulse, and                             oxygen saturations were monitored continuously. The                             procedure was aborted. The scope was not inserted                             into the esophagus as the patient was unable to                             tolerate due to execessive agitation despite                             increasing doses of medications. Decision was then                             made to abort the procedure and reschedule with                             anesthesia Medications were given.                                                                                    Findings:       - Unable to intubate esophagus due to patient agitation                                                                                   Moderate Sedation:       Moderate (conscious) sedation was administered by the endoscopy nurse         and supervised by the endoscopist. The patient's oxygen saturation,        heart rate, blood pressure and response to care were monitored. Total        physician intraservice time was 18 minutes.  Recommendation:           - Repeat upper endoscopy with MAC at appointment to                             be scheduled                            - Patient has a contact number available for                             emergencies. The signs and symptoms of potential                             delayed complications were discussed with the                             patient. Return to normal activities tomorrow.                             Written discharge instructions were provided to the                             patient.                                                                                     Electronically Signed by Dr. Palma  ______________________  Sisi Palma MD  9/21/2020 8:49:37 AM  I was physically present for the entire viewing portion of the exam.Enrike Palma MD  Number of Addenda: 0    Note Initiated On: 9/21/2020 8:11 AM  Scope In:  Scope Out:             Assessment/Plan:    Assessment & Plan       ICD-10-CM    1. Gastroesophageal reflux disease with esophagitis  K21.0 GASTROENTEROLOGY ADULT REF PROCEDURE ONLY   2. Pre-op exam  Z01.818 GASTROENTEROLOGY ADULT REF PROCEDURE ONLY     Patient is OK to proceed with EGD under anesthesia  No changes are needed in medication  No additional testing is required  EKG not indicated    Labs are up to date     OK to proceed     No modifiable risk identified  Low risk procedure   Low risk patient         Regular exercise    No follow-ups on file.    Grabiel Bajwa MD  [facility]  [facility]    Phone call duration:  20 minutes

## 2020-09-24 ENCOUNTER — TELEPHONE (OUTPATIENT)
Dept: FAMILY MEDICINE | Facility: CLINIC | Age: 40
End: 2020-09-24

## 2020-09-24 NOTE — TELEPHONE ENCOUNTER
Attempt # 1  Called patient at home number.  Patient answered phone, relayed below referral number - repeated correctly, Patient verbalized understanding and agreement with plan and had no questions.         Referral information If you have not heard from the scheduling office within 2 business days, please call [phone].            María Lr RN  [facility]

## 2020-09-24 NOTE — TELEPHONE ENCOUNTER
Reason for Call:  Other / Gastro referral    Detailed comments: Patient called and stated he spoke to Dr Bajwa about a gastro procedure he had done recently and was not satisfied with the outcome.  Patient also stated Dr Bajwa said that an appointment would be scheduled for him with Salvatore, and he would like to know when and where his appointment will be.  Please call patient back to discuss.    Phone Number Patient can be reached at: Home number on file [phone] (home)    Best Time: ASAP    Can we leave a detailed message on this number? YES    Call taken on 9/24/2020 at 4:02 PM by Rolanda Dawn

## 2020-09-25 ENCOUNTER — TELEPHONE (OUTPATIENT)
Dept: GASTROENTEROLOGY | Facility: CLINIC | Age: 40
End: 2020-09-25

## 2020-09-25 DIAGNOSIS — Z11.59 ENCOUNTER FOR SCREENING FOR OTHER VIRAL DISEASES: Primary | ICD-10-CM

## 2020-09-25 NOTE — TELEPHONE ENCOUNTER
Patient is scheduled for EGd with Dr. Cortes    Spoke with: patient    Date of Procedure: 10-07-20    Location: [facility]    Sedation Type MAC    Informed patient they will need an adult  yes    Informed Patient of COVID Test Requirement yes    Preferred Pharmacy for Pre Prescription chart    Confirmed Nurse will call to complete assessment yes    Additional comments: none

## 2020-09-30 ENCOUNTER — TELEPHONE (OUTPATIENT)
Dept: GASTROENTEROLOGY | Facility: OUTPATIENT CENTER | Age: 40
End: 2020-09-30

## 2020-09-30 NOTE — TELEPHONE ENCOUNTER
Patient taking any blood thinners ? No     Heart disease ? Denies      Lung disease ? Denies      Sleep apnea ? Denies      Diabetic ? Denies      Kidney disease ? Denies      Electronic implanted medical devices ? Denies     PTSD ? N/a      Prep instructions reviewed with patient ? Instructions, policy,MAC sedation plan reviewed. Advised patient to have someone stay with them post exam.     Yes    Pharmacy : N/a     Indication for procedure : Abdominal pain, epigastric     Referring provider : Smitha Rascon NP      Arrival Time : 12 PM

## 2020-10-03 DIAGNOSIS — Z11.59 ENCOUNTER FOR SCREENING FOR OTHER VIRAL DISEASES: ICD-10-CM

## 2020-10-03 PROCEDURE — U0003 INFECTIOUS AGENT DETECTION BY NUCLEIC ACID (DNA OR RNA); SEVERE ACUTE RESPIRATORY SYNDROME CORONAVIRUS 2 (SARS-COV-2) (CORONAVIRUS DISEASE [COVID-19]), AMPLIFIED PROBE TECHNIQUE, MAKING USE OF HIGH THROUGHPUT TECHNOLOGIES AS DESCRIBED BY CMS-2020-01-R: HCPCS | Performed by: INTERNAL MEDICINE

## 2020-10-04 LAB
SARS-COV-2 RNA SPEC QL NAA+PROBE: NOT DETECTED
SPECIMEN SOURCE: NORMAL

## 2020-10-05 ENCOUNTER — NURSE TRIAGE (OUTPATIENT)
Dept: NURSING | Facility: CLINIC | Age: 40
End: 2020-10-05

## 2020-10-05 NOTE — TELEPHONE ENCOUNTER

## 2020-10-07 ENCOUNTER — TRANSFERRED RECORDS (OUTPATIENT)
Dept: HEALTH INFORMATION MANAGEMENT | Facility: CLINIC | Age: 40
End: 2020-10-07

## 2020-10-07 ENCOUNTER — DOCUMENTATION ONLY (OUTPATIENT)
Dept: GASTROENTEROLOGY | Facility: OUTPATIENT CENTER | Age: 40
End: 2020-10-07
Attending: INTERNAL MEDICINE
Payer: COMMERCIAL

## 2020-10-07 DIAGNOSIS — Z01.818 PRE-OP EXAM: ICD-10-CM

## 2020-10-07 DIAGNOSIS — K21.00 GASTROESOPHAGEAL REFLUX DISEASE WITH ESOPHAGITIS: ICD-10-CM

## 2020-10-08 ENCOUNTER — NURSE TRIAGE (OUTPATIENT)
Dept: NURSING | Facility: CLINIC | Age: 40
End: 2020-10-08

## 2020-10-08 NOTE — TELEPHONE ENCOUNTER
The results aren't in the chart yet. I advised if they find anything they will contact him.  Vee Gupta RN  [facility]

## 2020-10-09 LAB — COPATH REPORT: NORMAL

## 2020-10-10 NOTE — RESULT ENCOUNTER NOTE
Tristin Dexter,    Attached is a copy of your pathology report from your recent upper endoscopy. The biopsies came back negative. No inflammation in your esophagus. We did do a dilation which may help with the swallowing problem. Please follow up with your PCP.     BEVERLY Cortes MD  Gastroenterology

## 2020-10-12 ENCOUNTER — TELEPHONE (OUTPATIENT)
Dept: FAMILY MEDICINE | Facility: CLINIC | Age: 40
End: 2020-10-12

## 2020-10-12 DIAGNOSIS — K21.00 GASTROESOPHAGEAL REFLUX DISEASE WITH ESOPHAGITIS WITHOUT HEMORRHAGE: Primary | ICD-10-CM

## 2020-10-12 RX ORDER — PANTOPRAZOLE SODIUM 40 MG/1
40 TABLET, DELAYED RELEASE ORAL DAILY
Qty: 31 TABLET | Refills: 3 | Status: SHIPPED | OUTPATIENT
Start: 2020-10-12 | End: 2021-06-07

## 2020-10-12 NOTE — TELEPHONE ENCOUNTER
Called patient, nurse read Dr Cortes's result note. He is still having acid reflux and he stated that the generic nexium is not helping him at all. He is wondering if the name brand name Nexium can be sent in. Preferred pharmacy cued.     Routed to provider to review.     Thank you.    Cher Rausch RN

## 2020-10-12 NOTE — TELEPHONE ENCOUNTER
Called patient at home. Left message to return call to nurse triage line at [phone].    Cher Rausch RN

## 2020-10-12 NOTE — TELEPHONE ENCOUNTER
Patient returned call to RN line and left message for call back to him.  Carie Goss RN  [facility]

## 2020-10-12 NOTE — TELEPHONE ENCOUNTER
Reason for call:  Patient reporting a symptom    Symptom or request: Acid Reflux    Duration (how long have symptoms been present): Unknown    Have you been treated for this before? No    Additional comments: Pt would like a call back to discuss and also would like to inquire on endoscopy results.    Phone Number patient can be reached at:  Home number on file [phone] (home)    Best Time:  Anytime    Can we leave a detailed message on this number:  NO    Call taken on 10/12/2020 at 12:40 PM by Kalpana Polanco

## 2020-10-12 NOTE — TELEPHONE ENCOUNTER
There was a small ring of thinkened tissue in the esophagus called a Shotsky's ring.  They stretched it out with a dilator which may cause discomfort for a day or two.    Instead of brand name nexium ( which is identical in every way to the generic, lets try protonix

## 2020-11-18 ENCOUNTER — TELEPHONE (OUTPATIENT)
Dept: FAMILY MEDICINE | Facility: CLINIC | Age: 40
End: 2020-11-18

## 2020-11-18 NOTE — TELEPHONE ENCOUNTER
Attempt # 1  Called patient at home number.[phone]  Was call answered?  Yes,  At night time, not sure if eating evening, fast heart rate, no SOB when happening, last night happened ate and then laid down right after eating. Once in awhile has nausea when fast pulse, happens through out the day but not an issue notices more right before bed. States feels normal, daytime goes about day time. Feels fine just nervous about the heart rate. Does admit probably does not drink enough water.    Scheduled appointment for Friday with PCP.     DENIES: abdominal pain, constipation, urination issues. Smoking, fever, cough, sweating.       Has not taken any medications other than Protonix and vitamins which he takes after eating breakfast.     Nurse advised patient drink 6 to 8 eight ounce glasses of water per day, call 911 if feels rapid pulse, sweating and SOB. Call clinic with new symptoms or questions/concerns. Patient verbalized understanding and agreement with plan and had no questions.           Routing to PCP as EDU Lr RN  [facility]

## 2020-11-18 NOTE — TELEPHONE ENCOUNTER
Reason for call:  Patient reporting a symptom    Symptom or request: fast heart rate at night    Duration (how long have symptoms been present): several days    Have you been treated for this before? No    Additional comments: Patient states he has a fast heart rate at night, wants to have it checked out by his PCP. He requested a callback from the doctor kendrick declined speaking with triage.     Phone Number patient can be reached at:  Cell number on file:    Telephone Information:   Mobile [phone]       Best Time:  today    Can we leave a detailed message on this number:  YES    Call taken on 11/18/2020 at 9:24 AM by Tuyet Eason

## 2020-11-20 ENCOUNTER — OFFICE VISIT (OUTPATIENT)
Dept: FAMILY MEDICINE | Facility: CLINIC | Age: 40
End: 2020-11-20
Payer: COMMERCIAL

## 2020-11-20 VITALS
WEIGHT: 217.25 LBS | HEART RATE: 74 BPM | TEMPERATURE: 98.5 F | BODY MASS INDEX: 35.07 KG/M2 | DIASTOLIC BLOOD PRESSURE: 80 MMHG | SYSTOLIC BLOOD PRESSURE: 122 MMHG

## 2020-11-20 DIAGNOSIS — R00.2 PALPITATIONS: Primary | ICD-10-CM

## 2020-11-20 DIAGNOSIS — F43.9 STRESS: ICD-10-CM

## 2020-11-20 LAB
ALBUMIN SERPL-MCNC: 3.7 G/DL (ref 3.4–5)
ALP SERPL-CCNC: 60 U/L (ref 40–150)
ALT SERPL W P-5'-P-CCNC: 25 U/L (ref 0–70)
ANION GAP SERPL CALCULATED.3IONS-SCNC: 5 MMOL/L (ref 3–14)
AST SERPL W P-5'-P-CCNC: 12 U/L (ref 0–45)
BASOPHILS # BLD AUTO: 0 10E9/L (ref 0–0.2)
BASOPHILS NFR BLD AUTO: 0.2 %
BILIRUB SERPL-MCNC: 0.3 MG/DL (ref 0.2–1.3)
BUN SERPL-MCNC: 13 MG/DL (ref 7–30)
CALCIUM SERPL-MCNC: 9.3 MG/DL (ref 8.5–10.1)
CHLORIDE SERPL-SCNC: 105 MMOL/L (ref 94–109)
CO2 SERPL-SCNC: 28 MMOL/L (ref 20–32)
CREAT SERPL-MCNC: 0.6 MG/DL (ref 0.66–1.25)
DIFFERENTIAL METHOD BLD: NORMAL
EOSINOPHIL # BLD AUTO: 0.1 10E9/L (ref 0–0.7)
EOSINOPHIL NFR BLD AUTO: 0.8 %
ERYTHROCYTE [DISTWIDTH] IN BLOOD BY AUTOMATED COUNT: 14.1 % (ref 10–15)
GFR SERPL CREATININE-BSD FRML MDRD: >90 ML/MIN/{1.73_M2}
GLUCOSE SERPL-MCNC: 78 MG/DL (ref 70–99)
HCT VFR BLD AUTO: 41.3 % (ref 40–53)
HGB BLD-MCNC: 14.1 G/DL (ref 13.3–17.7)
LYMPHOCYTES # BLD AUTO: 2.2 10E9/L (ref 0.8–5.3)
LYMPHOCYTES NFR BLD AUTO: 26.3 %
MAGNESIUM SERPL-MCNC: 2 MG/DL (ref 1.6–2.3)
MCH RBC QN AUTO: 26.6 PG (ref 26.5–33)
MCHC RBC AUTO-ENTMCNC: 34.1 G/DL (ref 31.5–36.5)
MCV RBC AUTO: 78 FL (ref 78–100)
MONOCYTES # BLD AUTO: 0.7 10E9/L (ref 0–1.3)
MONOCYTES NFR BLD AUTO: 7.7 %
NEUTROPHILS # BLD AUTO: 5.5 10E9/L (ref 1.6–8.3)
NEUTROPHILS NFR BLD AUTO: 65 %
PLATELET # BLD AUTO: 264 10E9/L (ref 150–450)
POTASSIUM SERPL-SCNC: 4.1 MMOL/L (ref 3.4–5.3)
PROT SERPL-MCNC: 8.2 G/DL (ref 6.8–8.8)
RBC # BLD AUTO: 5.31 10E12/L (ref 4.4–5.9)
SODIUM SERPL-SCNC: 138 MMOL/L (ref 133–144)
TSH SERPL DL<=0.005 MIU/L-ACNC: 0.98 MU/L (ref 0.4–4)
WBC # BLD AUTO: 8.5 10E9/L (ref 4–11)

## 2020-11-20 PROCEDURE — 99213 OFFICE O/P EST LOW 20 MIN: CPT | Performed by: FAMILY MEDICINE

## 2020-11-20 PROCEDURE — 83735 ASSAY OF MAGNESIUM: CPT | Performed by: FAMILY MEDICINE

## 2020-11-20 PROCEDURE — 36415 COLL VENOUS BLD VENIPUNCTURE: CPT | Performed by: FAMILY MEDICINE

## 2020-11-20 PROCEDURE — 80050 GENERAL HEALTH PANEL: CPT | Performed by: FAMILY MEDICINE

## 2020-11-20 NOTE — PATIENT INSTRUCTIONS
Gradually increase exercise    Stay well hydrated    Decrease caffeine use    We will send you lab results    Emergency room if needed

## 2020-11-20 NOTE — LETTER
[facility]   [address] [place]  [address]  [phone]                                   November 24, 2020    Iascc Wang  [address] [facility] [address]        Dear Isacc,    Your labs are fine     Follow up as needed based on symptoms     Be seen promptly if symptoms acutely worsen     Results for orders placed or performed in visit on 11/20/20   CBC with platelets differential     Status: None   Result Value Ref Range    WBC 8.5 4.0 - 11.0 10e9/L    RBC Count 5.31 4.4 - 5.9 10e12/L    Hemoglobin 14.1 13.3 - 17.7 g/dL    Hematocrit 41.3 40.0 - 53.0 %    MCV 78 78 - 100 fl    MCH 26.6 26.5 - 33.0 pg    MCHC 34.1 31.5 - 36.5 g/dL    RDW 14.1 10.0 - 15.0 %    Platelet Count 264 150 - 450 10e9/L    % Neutrophils 65.0 %    % Lymphocytes 26.3 %    % Monocytes 7.7 %    % Eosinophils 0.8 %    % Basophils 0.2 %    Absolute Neutrophil 5.5 1.6 - 8.3 10e9/L    Absolute Lymphocytes 2.2 0.8 - 5.3 10e9/L    Absolute Monocytes 0.7 0.0 - 1.3 10e9/L    Absolute Eosinophils 0.1 0.0 - 0.7 10e9/L    Absolute Basophils 0.0 0.0 - 0.2 10e9/L    Diff Method Automated Method    Comprehensive metabolic panel     Status: Abnormal   Result Value Ref Range    Sodium 138 133 - 144 mmol/L    Potassium 4.1 3.4 - 5.3 mmol/L    Chloride 105 94 - 109 mmol/L    Carbon Dioxide 28 20 - 32 mmol/L    Anion Gap 5 3 - 14 mmol/L    Glucose 78 70 - 99 mg/dL    Urea Nitrogen 13 7 - 30 mg/dL    Creatinine 0.60 (L) 0.66 - 1.25 mg/dL    GFR Estimate >90 >60 mL/min/[1.73_m2]    GFR Estimate If Black >90 >60 mL/min/[1.73_m2]    Calcium 9.3 8.5 - 10.1 mg/dL    Bilirubin Total 0.3 0.2 - 1.3 mg/dL    Albumin 3.7 3.4 - 5.0 g/dL    Protein Total 8.2 6.8 - 8.8 g/dL    Alkaline Phosphatase 60 40 - 150 U/L    ALT 25 0 - 70 U/L    AST 12 0 - 45 U/L   TSH with free T4 reflex     Status: None   Result Value Ref Range    TSH 0.98 0.40 - 4.00 mU/L   Magnesium     Status: None   Result Value Ref Range    Magnesium 2.0  1.6 - 2.3 mg/dL       If you have any questions please call the clinic at [phone]    Sincerely,    Robbie Gaytan MD  bmd

## 2020-11-20 NOTE — PROGRESS NOTES
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  Physical Exam  Constitutional:       Appearance: He is well-developed.   HENT:      Head: Normocephalic and atraumatic.   Eyes:      Conjunctiva/sclera: Conjunctivae normal.   Neck:      Vascular: No carotid bruit.   Cardiovascular:      Rate and Rhythm: Normal rate and regular rhythm.      Heart sounds: Normal heart sounds.   Pulmonary:      Effort: Pulmonary effort is normal. No respiratory distress.      Breath sounds: Normal breath sounds.   Neurological:      Mental Status: He is alert and oriented to person, place, and time.      Cranial Nerves: No cranial nerve deficit.   Psychiatric:         Speech: Speech normal.         Behavior: Behavior normal.         no shaking/ tremor when holding hands out in front           ASSESSMENT / PLAN:  (R00.2) Palpitations  (primary encounter diagnosis)  Comment: discussed in detail with patient. Exam here normal. Prudent to do a few labs.   Patient has  Had stress test a few years back, normal.  No  worrisome anginal type symptoms.   Plan: CBC with platelets differential, Comprehensive         metabolic panel, TSH with free T4 reflex,         Magnesium             (F43.9) Stress  Comment: discussed   Plan: gradually increase exercise. Increase hydration. Limit caffeine.  Get good amount of sleep.    Follow up prn symptoms     Be seen promptly if symptoms acutely worsen       I reviewed the patient's medications, allergies, medical history, family history, and social history.    Robbie Gaytan MD

## 2020-12-03 ENCOUNTER — NURSE TRIAGE (OUTPATIENT)
Dept: NURSING | Facility: CLINIC | Age: 40
End: 2020-12-03

## 2020-12-03 NOTE — TELEPHONE ENCOUNTER
Pt is calling in about lab test results from last week. Message in the chart from reviewed labs.  Labs are fine. Follow up as needed based on symptoms. Message was relayed to patient. Pt verbalized understanding.     Ronnie Cobb RN on 12/3/2020 at 3:34 PM

## 2020-12-08 ENCOUNTER — TELEPHONE (OUTPATIENT)
Dept: INTERNAL MEDICINE | Facility: CLINIC | Age: 40
End: 2020-12-08

## 2020-12-08 NOTE — TELEPHONE ENCOUNTER
Scheduled patient an appointment for tomorrow, 12/9 at 7AM with Dr. Guerrero to establish care.    MAU Palomino

## 2020-12-08 NOTE — TELEPHONE ENCOUNTER
Reason for Call:  Other call back    Detailed comments: Patient had been at [facility] which has closed.  Would like to receive care at [facility].  Is calling today to have a TB test ordered for work and would also like orders for an A1c.  Please order and schedule patients.    Phone Number Patient can be reached at: Cell number on file:    Telephone Information:   Mobile [phone]       Best Time: any time    Can we leave a detailed message on this number? YES    Call taken on 12/8/2020 at 8:34 AM by Nabila Vargas

## 2020-12-09 ENCOUNTER — OFFICE VISIT (OUTPATIENT)
Dept: INTERNAL MEDICINE | Facility: CLINIC | Age: 40
End: 2020-12-09
Payer: COMMERCIAL

## 2020-12-09 ENCOUNTER — NURSE TRIAGE (OUTPATIENT)
Dept: NURSING | Facility: CLINIC | Age: 40
End: 2020-12-09

## 2020-12-09 VITALS
OXYGEN SATURATION: 100 % | BODY MASS INDEX: 35.63 KG/M2 | HEIGHT: 66 IN | WEIGHT: 221.7 LBS | TEMPERATURE: 97.3 F | SYSTOLIC BLOOD PRESSURE: 112 MMHG | DIASTOLIC BLOOD PRESSURE: 88 MMHG | HEART RATE: 78 BPM

## 2020-12-09 DIAGNOSIS — K22.2 ESOPHAGEAL STRICTURE: ICD-10-CM

## 2020-12-09 DIAGNOSIS — K21.00 GASTROESOPHAGEAL REFLUX DISEASE WITH ESOPHAGITIS, UNSPECIFIED WHETHER HEMORRHAGE: ICD-10-CM

## 2020-12-09 DIAGNOSIS — Z11.1 SCREENING EXAMINATION FOR PULMONARY TUBERCULOSIS: Primary | ICD-10-CM

## 2020-12-09 DIAGNOSIS — E66.01 MORBID OBESITY (H): ICD-10-CM

## 2020-12-09 DIAGNOSIS — Z02.9 ENCOUNTER FOR ADMINISTRATIVE EXAMINATIONS: ICD-10-CM

## 2020-12-09 DIAGNOSIS — Z23 NEED FOR HEPATITIS B VACCINATION: ICD-10-CM

## 2020-12-09 LAB — HBV SURFACE AB SERPL IA-ACNC: 0.43 M[IU]/ML

## 2020-12-09 PROCEDURE — 36415 COLL VENOUS BLD VENIPUNCTURE: CPT | Performed by: INTERNAL MEDICINE

## 2020-12-09 PROCEDURE — 86706 HEP B SURFACE ANTIBODY: CPT | Performed by: INTERNAL MEDICINE

## 2020-12-09 PROCEDURE — 99213 OFFICE O/P EST LOW 20 MIN: CPT | Performed by: INTERNAL MEDICINE

## 2020-12-09 PROCEDURE — 86481 TB AG RESPONSE T-CELL SUSP: CPT | Performed by: INTERNAL MEDICINE

## 2020-12-09 ASSESSMENT — MIFFLIN-ST. JEOR: SCORE: 1848.37

## 2020-12-09 NOTE — PROGRESS NOTES
"Subjective     Isacc Wang is a 40 year old male who presents to clinic today for the following health issues:    HPI         Chief Complaint   Patient presents with     Establish Care     Labs Only     patient requesting quantiferon tb gold plus blood test be completed for work       1.  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  He has never had a positive TB screening test, has no known exposures to tuberculosis.    His form also Is requesting verification of hepatitis B immunity either through records of prior vaccination, or blood test demonstrating active immunity.    He reports that he has gotten his hepatitis B shots at some point the past but does not have specific records.  I see no record of previous hepatitis B serology in his Epic charts either here in our system or other healthcare systems    2.  Chronic reflux.  Has taken Zantac, Prilosec, and similar medicines for last 20+ years.  Had upper end currently taking pantoprazole oscopy performed in October 2020 with some esophagitis and benign esophageal stricture dilated.  40 mg daily and states this medicine \"is not working \".  He still has regular reflux symptoms although he admits are better than previously to taking the medicine.  Denies dysphagia.    3.  The patient has had a history of ongoing obesity.  Reviewed the weigth curves.   Their current BMI is:  Body mass index is 35.78 kg/m .      Reviewed weights in chart:  Wt Readings from Last 10 Encounters:   12/09/20 100.6 kg (221 lb 11.2 oz)   11/20/20 98.5 kg (217 lb 4 oz)   09/17/20 98.9 kg (218 lb)   08/14/20 100.2 kg (221 lb)   07/16/20 101.6 kg (224 lb)   06/26/20 101.2 kg (223 lb)   12/09/19 107.5 kg (237 lb)   05/21/19 106.1 kg (234 lb)   02/17/19 99.8 kg (220 lb)   10/19/17 95.3 kg (210 lb)        Review of Systems   Constitutional, HEENT, cardiovascular, pulmonary, gi and gu systems " "are negative, except as otherwise noted.      Objective    /88   Pulse 78   Temp 97.3  F (36.3  C) (Temporal)   Ht 1.676 m (5' 6\")   Wt 100.6 kg (221 lb 11.2 oz)   SpO2 100%   BMI 35.78 kg/m    Body mass index is 35.78 kg/m .  Physical Exam   GENERAL alert and no distress  EYES:  Normal sclera,conjunctiva, EOMI  HENT: oral and posterior pharynx without lesions or erythema, facies symmetric  NECK: Neck supple. No LAD, without thyroidmegaly.  RESP: Clear to ausculation bilaterally without wheezes or crackles. Normal BS in all fields.  CV: RRR normal S1S2 without murmurs, rubs or gallops.  LYMPH: no cervical lymph adenopathy appreciated  MS: extremities- no gross deformities of the visible extremities noted,   EXT:  no lower extremity edema  PSYCH: Alert and oriented times 3; speech- coherent  SKIN:  No obvious significant skin lesions on visible portions of face             (Z11.1) Screening examination for pulmonary tuberculosis  (primary encounter diagnosis)  Comment: QuantiFERON gold screening test.  If positive, will need chest x-ray and further discussion about possible exposure at some point.  Plan: Quantiferon TB Gold Plus            (Z02.9) Encounter for administrative examinations  Comment:   Plan: Quantiferon TB Gold Plus, Hepatitis B Surface         Antibody            (Z23) Need for hepatitis B vaccination  Comment: Since no records of hepatitis B vaccination are available, will check hepatitis B surface antibody to confirm the presence of active immunity.  If the antibody test is negative, I would recommend at least one booster shot, more likely a second shot in 6 months to confirm adequate immunity.  Plan: Hepatitis B Surface Antibody            (K21.00) Gastroesophageal reflux disease with esophagitis, unspecified whether hemorrhage  Comment: Discussed reflux.  Pantoprazole is helping.  Discussed the fact that the H2 and PPI medications do not stop reflux from happening, they simply make the " material less acidic and therefore less caustic to the esophagus.  It is still incredibly important to employ antireflux measures for control of reflux symptoms, including monitoring what is eaten, how much is eating, and when it is eaten as they relate to symptoms.  Also recommended elevating the head of the bed with either a wedge pillow or bed elevation to control reflux symptoms.    If he continues to have symptoms despite antireflux measures and prescription PPI, he should then follow-up with the [facility] for further evaluation.  Plan:     (K22.2) Esophageal stricture  Comment: Benign esophageal stricture dilated, long history of reflux, recommended that that he continue PPI therapy indefinitely.  Plan:     (E66.01) Obesity (BMI 35.0-39.9) with comorbidity (H)  Comment: Discussed low carbohydrate diet as the most powerful way to lose weight and prevent diabetes.  Plan:

## 2020-12-09 NOTE — PATIENT INSTRUCTIONS
"*  Blood screening test to confirm no prior exposure to TB    *  Blood test to confirm active immunity against Hepatitis B    *  If the Hepatitis B antibody tests are negative, then you will need a booster vaccine or two.     *  If your acid reflux is still a problem, then make an appointment with the specilaists at [facility] [phone] (fax [phone])      *  Due to the esophageal stricture seen Oct 2020, you should continue to take a medication like Prilosec or pantoprazole every day indefinitely.       Gastroesophageal reflux (GERD)/Heartburn:     *  Anti reflux measures:     --Avoid meals within 3 hours of bedtime.     --Consider using a \"wedge pillow\" to elevated your head while sleeping.      Wedge pillow:  Can get these at any medical supply store or [employer]        *  Many times, this condition is caused by what you eat, when you eat, how you eat, how much you eat.    If there are foods, food combinations or eating patterns that cause your symptoms to be worse, then alter these things to see if things improve.     * Specifically avoid any specific foods that cause problems, such as tomato sauces/onions/peppermints/spearmint.    Avoid OTC medications such as Aspirin , Ibuprofen or Aleve which may cause stomach irritation.       *  Try Simethicone (Gas-x, Phazyme, Riopan Plus, Maalox Plus, etc.) as needed for gas and bloating.    *  Trial removing all the wheat (gluten) from your diet for one month and see if this produces any benefit for your reflux.          OBESITY/WEIGHT LOSS:     *  Obesity is a major problem in this country.  Over 2/3 of adult Americans are overweight (BMI Over 25), and 1/3 are obese (BMI over 30)    *  Obesity is the leading cause of diabetes type II, which currently affects 1 out of 10 adult Americans and is projected to potentially affect 1 out of 3 adult Americans by 2040    *  Chronic obesity leads to \"insulin resistance\" which affects the " "carbohydrate (sugar) metabolism causing \"diabetisy\" which leads to type II Diabetes, increased visceral fat, a chronic low grade inflammatory state that leads to higher rates of vascular disease among other things.     *  Obesity is defined as BMI (body mass index) greater than 30.    *  Morbid obesity is defined as BMI over 40    Your most recent Body mass index is:  Body mass index is 35.78 kg/m .    The main principles in weight loss are diet and exercise. You need to have both.      + Be physically active. Do activities that you enjoy, give you energy and are safe for you to do.Gradually build up the intensity (how hard your body is working) of activity. Long-term, aim for 10,000 steps OR 30 minutes or more of activity most days of the week.     +  It is very hard to lose weight with diet changes alone.  You need to have regular exercise.  You should aim for either 3 hours total per week total of cumulative physical aerobic activity or approximately 10,000 steps per day of activity.  Buy a pedometer or other fitness tracker and keep track of your activity.  If your typical daily activity does not add up to 10,000 total steps, then make up the difference with walking or some sort of aerobic activity.        + Eat \"real food\" (not processed), I.e. Never eat a single food item with more than 6 ingredients listed on the label.    +  Eat mostly vegetables, fruit, whole grains and lean proteins. That way, you--not food manufacturers--control the ingredients that go into your meals.    +  Keep your food shopping to the outside of the grocery store, do not eat foods that come from a bag or box, do not eat foods with bar codes.    + Aim for 5 servings of fruits and vegetables a day. Choose a variety of vegetables with different colors. Have fresh fruit for dessert. Limit  deep-fried vegetables, such as french fries.    + Choose lean protein, such as chicken or fish. Try non-meat sources of protein such as beans, soy and " "other legumes.    + Choose whole grains. Whole grain foods, such as whole-wheat bread, brown rice, barley, quinoa and oatmeal, contain the entire grain kernel and are better for your health. Limit refined grains, such as white bread and rice.    + Satisfy hunger with unsaturated fat. Fat helps you feel satisfied. Choose unsaturated fats, such as canola or olive oils, nuts and seeds, oil-based dressings and avocados. Limit saturated fats, which are found in animal products and some plant oils, such as coconut and palm oils.    +  Figure out what kind of calories you are taking in now at this time.  It is hard to change behaviors that you do not understand.      +  Keep your calorie intake at least less than 1400 per day to start (under 1200 total if you want to be more aggressive), divided between 3 smaller meals (try to have no meal more than 500 calories) and 2 snacks.      +  \"Learn what 500 calories looks like\" and try to keep all meals under 500 calories.      + Pay attention to portion sizes. Use smaller plates, bowls and glasses. Portion out foods before you eat.    +  Use the \"fork rule\" for all eating. [If you can't pick food up with a fork, then the food will not turn off hunger very well. Using this rule helps patients avoid choosing the wrong types of food, especially if you have had a bariatric surgery operation.   The \"wrong foods\" include liquid calories such as soup, juice, soda, slimfast, ice cream, and beer, crumbly foods such as chips, crackers, and cookies, and starch based foods such as pasta, too much rice, and too much bread.]     + Drink water or unsweetened beverages. Avoid soda, sweetened coffees and teas, energy drinks and sports drinks, which are full of added sugar that your body does not need. Water is always the best option.    +  Drink one large glass of water BEFORE each meal.  This not only helps guard against dehydration, but it also helps provide additional \"fullness\" that will " "help reduce the amount of food that you will consume in a given meal by helping you fill up earlier.      + Eat mindfully. Take time to fully enjoy your food and pay attention to what you are eating. Make meals last 15 to 30 minutes. This gives your body a chance to become satisfied and tell your brain to stop eating. Pay attention to what you are eating, rather than doing other activities such as watching TV or driving. This helps you pay attention to your body s signals of hunger and fullness.    + Share meals when eating at restaurants, or put half of the entrée in a to-go container before you start eating.    +  Try to eat breakfast every day.  Skipping meals has been shown to be one of the factors that correlates the highest with obesity, (even more than fast food).  Try to avoid the typical carbohydrates and sugars that dominate the typical American breakfast.  Try to get more protein in earlier in the day, this will make you less hungry later.       +  Try High Protein Ensure or Boost nutritional supplements (or similar versions) for breakfast if nothing else.      +  I NEVER recommend over the counter diet aids such as Metabolife or Dexatrim since they have a high risk of side effects mainly fast heart rate, insomnia, and nervousness.      Good resources for nutrition are:         --Good Grains:  Oats, brown rice, Quinoa (these do not raise the blood sugar as much)     --Bad grains:  Anything made from wheat or white rice     (because these raise the blood sugars significantly, and the possible gluten issue from wheat for some people).      --Proteins:  Aim for \"lean proteins\" including chicken, fish, seafood, pork, turkey, and eggs (in moderation); Eat red meat only occasionally      ---> \"Wheat Belly\" by Farshad Sher  (a book about the many bad things processed wheat products (i.e. Bread) have caused in our country...which I believe has serious merit)       --->  \"The Paleo Diet\"  By Kanchan Villeda.       " "      --->\"In Defense of Food\"  Of \"Food Rules\" (Mega Jerry) a book that goes into the causes obesity epidemic in our country    Pepe Rosalino:                    ---> \"Picture Perfect Weight Loss\" by Josafat Hannon (another good book about choices)        ---> \"Eat This, Not That\" Series,  by Sudarshan Padilla  (a book about food choices in the modern world)              ---> \"The Blood Sugar Solution\" by Mayank Conti ( a book about obesity and insulin resistance leading to \"diabesity\")           Calorie Gael ( a guidebook for counting calories, and knowing the components of the food that you eat)       \"The Best Life Diet\"  (a complete diet program)             Aim for a Healthy Weight Web Page at www.nhlbi.nih.gov       Lavallette Diet       Henry De Los Santos:  \"Eat More, Weigh Less\" or \"The Program for the Reversal of Heart Disease\"       [facility] web site  the food and nutrition link.       American Heart Association       American Diabetes Association (www.diabetes.org)              Eat Better ? Move More ? Live Well     Eat 3 nutrient-rich meals each day     Don t skip meals--it will cause you to overeat later in the day!     Eating fiber (vegetables/fruits/whole grains) and protein with meals helps you stay full longer     Choose foods with less than 10 grams of sugar and 5 grams of fat per serving to prevent excess calories and weight re-gain   Eat around the same times each day to develop a routine eating schedule    Avoid snacking unless physically hungry.   Planned snacks: 1-2 times per day and no more than 150 calories    Eat protein first    Protein helps with healing, maintaining adequate muscle mass, reducing hunger and optimizing nutritional status    Aim for 60-80 grams of protein per day   Fill up on Fiber    Fiber comes from plants--fruits, veggies, whole grains, nuts/seeds and beans    Fiber is low in calories, high in phytonutrients and helps you stay full longer    Aim for 25-35 grams per day " by eating fiber with meals and snacks  Eat [id number]    Take 20-30 minutes to eat each meal by taking small bites, chewing foods to applesauce consistency or 20-30 times before you swallow    Eating foods too fast can delay satiety/fullness signals and increase overeating     Slow down your eating by using toddler utensils, putting your fork/spoon down between bites and not watching TV or emailing during meals!   Keep a Journal          Writing down what you eat, how you feel and when you are active helps you identify new changes to work on from week to week          Look for ways to cut 100 calories from your current diet 2-3 times per day  Drink 64 ounces of 0-Calorie drinks between meals    Water    Zero calorie Propel  or Vitamin Water      SoBe Lifewater  Zero Calories    Crystal Light , Sugar-Free Pepito-Aid , and other sugar-free lemonade or flavored taylor    Keep Caffeine to less than 300mg per day ie: 3-6oz cups coffee      Work up to 45-60 minutes of physical activity most days of the week    Helps with losing weight and prevent regaining those extra pounds!     Do a combo of cardio (walking/water exercises) and strength training (lifting weights/Vinyasa yoga)     Avoid Mindless Eating    Be present when you eat--take note of the smell, taste and quality of your food    Make a list of alternative activities you could do to prevent eating out of boredom/stress    Go for a walk, call a friend, chew gum, paint your nails, re-organize the garage, etc

## 2020-12-10 ENCOUNTER — NURSE TRIAGE (OUTPATIENT)
Dept: NURSING | Facility: CLINIC | Age: 40
End: 2020-12-10

## 2020-12-10 LAB
GAMMA INTERFERON BACKGROUND BLD IA-ACNC: 0.1 IU/ML
M TB IFN-G CD4+ BCKGRND COR BLD-ACNC: 9.9 IU/ML
M TB TUBERC IFN-G BLD QL: NEGATIVE
MITOGEN IGNF BCKGRD COR BLD-ACNC: 0 IU/ML
MITOGEN IGNF BCKGRD COR BLD-ACNC: 0.01 IU/ML

## 2020-12-10 NOTE — TELEPHONE ENCOUNTER
"Patient calling for results. Gave per rabia/MD Lyssa Robertson RN [facility]    COVID 19 Nurse Triage Plan/Patient Instructions    Please be aware that novel coronavirus (COVID-19) may be circulating in the community. If you develop symptoms such as fever, cough, or SOB or if you have concerns about the presence of another infection including coronavirus (COVID-19), please contact your health care provider or visit [email or web address].     Disposition/Instructions    Additional COVID19 information to add for patients.   How can I protect others?  If you have symptoms (fever, cough, body aches or trouble breathing): Stay home and away from others (self-isolate) until:    At least 10 days have passed since your symptoms started, And     You ve had no fever--and no medicine that reduces fever--for 1 full day (24 hours), And      Your other symptoms have resolved (gotten better).     If you don t have symptoms, but a test showed that you have COVID-19 (you tested positive):    Stay home and away from others (self-isolate). Follow the tips under \"How do I self-isolate?\" below for 10 days (20 days if you have a weak immune system).    You don't need to be retested for COVID-19 before going back to school or work. As long as you're fever-free and feeling better, you can go back to school, work and other activities after waiting the 10 or 20 days.     How do I self-isolate?    Stay in your own room, even for meals. Use your own bathroom if you can.     Stay away from others in your home. No hugging, kissing or shaking hands. No visitors.    Don t go to work, school or anywhere else.     Clean  high touch  surfaces often (doorknobs, counters, handles, etc.). Use a household cleaning spray or wipes. You ll find a full list on the EPA website:  www.epa.gov/pesticide-registration/list-n-disinfectants-use-against-sars-cov-2.    Cover your mouth and nose with a mask, tissue or washcloth to avoid spreading germs.    Wash " your hands and face often. Use soap and water.    Caregivers in these groups are at risk for severe illness due to COVID-19:  o People 65 years and older  o People who live in a nursing home or long-term care facility  o People with chronic disease (lung, heart, cancer, diabetes, kidney, liver, immunologic)  o People who have a weakened immune system, including those who:  - Are in cancer treatment  - Take medicine that weakens the immune system, such as corticosteroids  - Had a bone marrow or organ transplant  - Have an immune deficiency  - Have poorly controlled HIV or AIDS  - Are obese (body mass index of 40 or higher)  - Smoke regularly    Caregivers should wear gloves while washing dishes, handling laundry and cleaning bedrooms and bathrooms.    Use caution when washing and drying laundry: Don t shake dirty laundry, and use the warmest water setting that you can.    For more tips, go to www.cdc.gov/coronavirus/2019-ncov/downloads/10Things.pdf.    How can I take care of myself?  1. Get lots of rest. Drink extra fluids (unless a doctor has told you not to).     2. Take Tylenol (acetaminophen) for fever or pain. If you have liver or kidney problems, ask your family doctor if it s okay to take Tylenol.     Adults can take either:     650 mg (two 325 mg pills) every 4 to 6 hours, or     1,000 mg (two 500 mg pills) every 8 hours as needed.     Note: Don t take more than 3,000 mg in one day.   Acetaminophen is found in many medicines (both prescribed and over-the-counter medicines). Read all labels to be sure you don t take too much.     For children, check the Tylenol bottle for the right dose. The dose is based on the child s age or weight.    3. If you have other health problems (like cancer, heart failure, an organ transplant or severe kidney disease): Call your specialty clinic if you don t feel better in the next 2 days.    4. Know when to call 911: Emergency warning signs include:    Trouble breathing or  shortness of breath    Pain or pressure in the chest that doesn t go away    Feeling confused like you haven t felt before, or not being able to wake up    Bluish-colored lips or face    What are the symptoms of COVID-19?     The most common symptoms are cough, fever and trouble breathing.     Less common symptoms include body aches, chills, diarrhea (loose, watery poops), fatigue (feeling very tired), headache, runny nose, sore throat and loss of smell.    COVID-19 can cause severe coughing (bronchitis) and lung infection (pneumonia).    How does it spread?     The virus may spread when a person coughs or sneezes into the air. The virus can travel about 6 feet this way, and it can live on surfaces.      Common  (household disinfectants) will kill the virus.    Who is at risk?  Anyone can catch COVID-19 if they re around someone who has the virus.    How can others protect themselves?     Stay away from people who have COVID-19 (or symptoms of COVID-19).    Wash hands often with soap and water. Or, use hand  with at least 60% alcohol.    Avoid touching the eyes, nose or mouth.     Wear a face mask when you go out in public, when sick or when caring for a sick person.    Where can I get more information?    [facility]: About COVID-19: [email or web address]    CDC: What to Do If You re Sick: www.cdc.gov/coronavirus/2019-ncov/about/steps-when-sick.html    CDC: Ending Home Isolation: www.cdc.gov/coronavirus/2019-ncov/hcp/disposition-in-home-patients.html     CDC: Caring for Someone: www.cdc.gov/coronavirus/2019-ncov/if-you-are-sick/care-for-someone.html     [facility]: Interim Guidance for Hospital Discharge to Home: www.health.[facility].[place].us/diseases/coronavirus/hcp/hospdischarge.pdf    [facility] clinical trials (COVID-19 research studies): clinicalaffairs.[facility]-clinical-trials     Below are the COVID-19 hotlines at the [place] Department of Health ([facility]). Interpreters are  available.   o For health questions: Call 588-913-1332 or 1-530.886.8085 (7 a.m. to 7 p.m.)  o For questions about schools and childcare: Call 913-117-6338 or 1-819.289.4028 (7 a.m. to 7 p.m.)          Thank you for taking steps to prevent the spread of this virus.  o Limit your contact with others.  o Wear a simple mask to cover your cough.  o Wash your hands well and often.    Resources    [facility]: About COVID-19: [email or web address]    CDC: What to Do If You're Sick: www.cdc.gov/coronavirus/2019-ncov/about/steps-when-sick.html    CDC: Ending Home Isolation: www.cdc.gov/coronavirus/2019-ncov/hcp/disposition-in-home-patients.html     CDC: Caring for Someone: www.cdc.gov/coronavirus/2019-ncov/if-you-are-sick/care-for-someone.html     [facility]: Interim Guidance for Hospital Discharge to Home: www.[facility].[place]/diseases/coronavirus/hcp/hospdischarge.pdf    [facility] clinical trials (COVID-19 research studies): clinicalaffairs.[facility]/n-clinical-trials     Below are the COVID-19 hotlines at the [place] Department of Health ([facility]). Interpreters are available.   o For health questions: Call 720-591-2905 or 1-760.568.9545 (7 a.m. to 7 p.m.)  o For questions about schools and childcare: Call 190-803-7091 or 1-180.969.9092 (7 a.m. to 7 p.m.)

## 2020-12-10 NOTE — TELEPHONE ENCOUNTER
"Patient wanted to know the result of his \"tuberculosis\" test.  Patient says it's value was 0.43.  Per chart, quantiferon gold test is in process. FNA reviewed his two labs and 0.43 is actually the valuate for his Hep B surface antibody test. The value shows no exposure to Hep B if less than 8.  Left message for patient to tawana back to inform.    Reason for Disposition    Caller requesting lab results    Additional Information    Negative: ED call to PCP    Negative: Physician call to PCP    Negative: Call about patient who is currently hospitalized    Negative: Lab or radiology calling with CRITICAL test results    Negative: [1] Prescription not at pharmacy AND [2] was prescribed today by PCP    Negative: [1] Follow-up call from patient regarding patient's clinical status AND [2] information urgent    Negative: [1] Caller requests to speak ONLY to PCP AND [2] urgent question    Negative: [1] Caller requests to speak to PCP now AND [2] won't tell us reason for call  (Exception: if 10 pm to 6 am, caller must first discuss reason for the call)    Negative: Notification of hospital admission    Negative: Notification of death    Protocols used: PCP CALL - NO TRIAGE-[facility]      "

## 2020-12-10 NOTE — RESULT ENCOUNTER NOTE
"Your labs indicate that you do not have adequate immunity against Hepatitis B from the vaccinations you received years ago.   I would recommend that you receive a Hepatitis B booster shot now, and then again in 6 months to give you proper immunity against Hepatitis B.   You can schedule these as a \"nurse only\" appointment at your convenience, or possibly even get these from our pharmacy.     I am still waiting for the results of the Quantiferon B blood test to return, I will let you know the results when they return"

## 2020-12-10 NOTE — TELEPHONE ENCOUNTER
Calling to discuss results on My Chart.   Result he viewed is for Hepatitis B. It shows no antibodies. Advised that he discuss with provider--if he may need to be vaccinated for Hepatitis B.   He wants the results for his TB test--those results are still in process.     He will call provider when clinic is open tomorrow.     Deb Meneses RN Triage Nurse Advisor 7:23 PM 12/9/2020      Reason for Disposition    Caller requesting lab results    Additional Information    Negative: ED call to PCP    Negative: Physician call to PCP    Negative: Call about patient who is currently hospitalized    Negative: Lab or radiology calling with CRITICAL test results    Negative: [1] Prescription not at pharmacy AND [2] was prescribed today by PCP    Negative: [1] Follow-up call from patient regarding patient's clinical status AND [2] information urgent    Negative: [1] Caller requests to speak ONLY to PCP AND [2] urgent question    Negative: [1] Caller requests to speak to PCP now AND [2] won't tell us reason for call  (Exception: if 10 pm to 6 am, caller must first discuss reason for the call)    Negative: Notification of hospital admission    Negative: Notification of death    Negative: Lab calling with strep throat test results and triager can call in prescription    Negative: Lab calling with urinalysis test results and triager can call in prescription    Negative: Medication questions    Protocols used: [facility]

## 2020-12-11 ENCOUNTER — ALLIED HEALTH/NURSE VISIT (OUTPATIENT)
Dept: NURSING | Facility: CLINIC | Age: 40
End: 2020-12-11
Payer: COMMERCIAL

## 2020-12-11 DIAGNOSIS — Z23 NEED FOR HEPATITIS B VACCINATION: ICD-10-CM

## 2020-12-11 PROCEDURE — 90471 IMMUNIZATION ADMIN: CPT

## 2020-12-11 PROCEDURE — 90746 HEPB VACCINE 3 DOSE ADULT IM: CPT

## 2021-01-14 ENCOUNTER — ALLIED HEALTH/NURSE VISIT (OUTPATIENT)
Dept: NURSING | Facility: CLINIC | Age: 41
End: 2021-01-14
Payer: COMMERCIAL

## 2021-01-14 DIAGNOSIS — Z23 NEED FOR HEPATITIS B VACCINATION: Primary | ICD-10-CM

## 2021-01-14 PROCEDURE — 90746 HEPB VACCINE 3 DOSE ADULT IM: CPT

## 2021-01-14 PROCEDURE — 90471 IMMUNIZATION ADMIN: CPT

## 2021-01-15 ENCOUNTER — HEALTH MAINTENANCE LETTER (OUTPATIENT)
Age: 41
End: 2021-01-15

## 2021-02-01 ENCOUNTER — TRANSFERRED RECORDS (OUTPATIENT)
Dept: HEALTH INFORMATION MANAGEMENT | Facility: CLINIC | Age: 41
End: 2021-02-01

## 2021-02-11 LAB — HIV 1&2 EXT: NORMAL

## 2021-04-13 ENCOUNTER — IMMUNIZATION (OUTPATIENT)
Dept: NURSING | Facility: CLINIC | Age: 41
End: 2021-04-13
Payer: COMMERCIAL

## 2021-04-13 PROCEDURE — 91300 PR COVID VAC PFIZER DIL RECON 30 MCG/0.3 ML IM: CPT

## 2021-04-13 PROCEDURE — 0001A PR COVID VAC PFIZER DIL RECON 30 MCG/0.3 ML IM: CPT

## 2021-05-05 ENCOUNTER — IMMUNIZATION (OUTPATIENT)
Dept: NURSING | Facility: CLINIC | Age: 41
End: 2021-05-05
Attending: INTERNAL MEDICINE
Payer: COMMERCIAL

## 2021-05-05 PROCEDURE — 0002A PR COVID VAC PFIZER DIL RECON 30 MCG/0.3 ML IM: CPT

## 2021-05-05 PROCEDURE — 91300 PR COVID VAC PFIZER DIL RECON 30 MCG/0.3 ML IM: CPT

## 2021-06-07 ENCOUNTER — OFFICE VISIT (OUTPATIENT)
Dept: FAMILY MEDICINE | Facility: CLINIC | Age: 41
End: 2021-06-07
Payer: COMMERCIAL

## 2021-06-07 VITALS
BODY MASS INDEX: 37.93 KG/M2 | TEMPERATURE: 98.7 F | WEIGHT: 235 LBS | SYSTOLIC BLOOD PRESSURE: 118 MMHG | HEART RATE: 88 BPM | DIASTOLIC BLOOD PRESSURE: 72 MMHG | OXYGEN SATURATION: 98 %

## 2021-06-07 DIAGNOSIS — R00.2 PALPITATIONS: ICD-10-CM

## 2021-06-07 DIAGNOSIS — K21.9 GASTROESOPHAGEAL REFLUX DISEASE WITHOUT ESOPHAGITIS: ICD-10-CM

## 2021-06-07 DIAGNOSIS — K22.2 SCHATZKI'S RING: Primary | ICD-10-CM

## 2021-06-07 DIAGNOSIS — M1A.00X0 IDIOPATHIC CHRONIC GOUT WITHOUT TOPHUS, UNSPECIFIED SITE: ICD-10-CM

## 2021-06-07 PROCEDURE — 99213 OFFICE O/P EST LOW 20 MIN: CPT | Performed by: FAMILY MEDICINE

## 2021-06-07 NOTE — PROGRESS NOTES
"    Assessment & Plan   Isacc is a 41 yo M with hpld, obesity, gou, esophageal stritcutre, fam hx colon cancer     Schatzki's ring'Esophageal stricture    Symptoms seem to be recurring; discussed evaluation by GI  - GASTROENTEROLOGY ADULT REF CONSULT ONLY; Future    Gastroesophageal reflux disease without esophagitis    Doing OTC antacid.  - GASTROENTEROLOGY ADULT REF CONSULT ONLY; Future    Palpitations    -  Drinks energy drinks and coffee regularly, discussed cutting down and will follow up for EKG. If worsens will return    Idiopathic chronic gout without tophus, unspecified site     -  Stable    BMI 37.0-37.9, adult     Estimated body mass index is 37.93 kg/m  as calculated from the following:    Height as of 12/9/20: 1.676 m (5' 6\").    Weight as of this encounter: 106.6 kg (235 lb).   Weight management plan: Discussed healthy diet and exercise guidelines    Return in about 1 month (around 7/7/2021) for Routine Visit.    Alfonso Heck MD  [facility]    Subjective   Isacc is a 40 year old who presents for the following health issues:    HPI     Chief Complaint   Patient presents with     Orders     endoscopy-discuss weight loss and GERD     Esophageal stricture:    Seen on endoscopy 10/2020.    GERD    He was on Protonix, but did not help taking Zantac 360 and helping    Worse with pizza and saucy food.    Obesity:  Wt Readings from Last 4 Encounters:   06/07/21 106.6 kg (235 lb)   12/09/20 100.6 kg (221 lb 11.2 oz)   11/20/20 98.5 kg (217 lb 4 oz)   09/17/20 98.9 kg (218 lb)     Palpitations;   Does drink coffee, tea, and energy drinks occasionally    Review of Systems   Constitutional, HEENT, cardiovascular, pulmonary and gu systems are negative, except as otherwise noted.      Objective    /72   Pulse 88   Temp 98.7  F (37.1  C) (Oral)   Wt 106.6 kg (235 lb)   SpO2 98%   BMI 37.93 kg/m    Body mass index is 37.93 kg/m .  Physical Exam   GENERAL: healthy, alert and " no distress  RESP: lungs clear to auscultation - no rales, rhonchi or wheezes  CV: regular rate and rhythm, no murmur  ABDOMEN: soft, obese, nontender, no masses and bowel sounds normal  PSYCH: mentation appears normal, affect normal/bright

## 2021-06-11 ENCOUNTER — TELEPHONE (OUTPATIENT)
Dept: FAMILY MEDICINE | Facility: CLINIC | Age: 41
End: 2021-06-11

## 2021-06-11 NOTE — TELEPHONE ENCOUNTER
Gracie [facility] [phone] received referral but needs a new referral for Upper Endoscopy and would like this faxed to [phone] and a verbal called to [phone].  Ly Germain,

## 2021-06-16 ENCOUNTER — TRANSFERRED RECORDS (OUTPATIENT)
Dept: HEALTH INFORMATION MANAGEMENT | Facility: CLINIC | Age: 41
End: 2021-06-16

## 2021-06-21 ENCOUNTER — TRANSFERRED RECORDS (OUTPATIENT)
Dept: HEALTH INFORMATION MANAGEMENT | Facility: CLINIC | Age: 41
End: 2021-06-21

## 2021-06-28 DIAGNOSIS — K22.2 SCHATZKI'S RING: ICD-10-CM

## 2021-06-28 DIAGNOSIS — K21.00 GASTROESOPHAGEAL REFLUX DISEASE WITH ESOPHAGITIS WITHOUT HEMORRHAGE: Primary | ICD-10-CM

## 2021-06-28 NOTE — TELEPHONE ENCOUNTER
No records seen in chart from recent endoscopy. Routing to team to see if results were faxed to clinic.    Maribel CHOI RN, BSN  [facility]

## 2021-06-28 NOTE — TELEPHONE ENCOUNTER
Wants to get Endoscopy test results done over a week ago. Please advise. Ok to leave a detailed message. Pt. states results were faxed over to clinic several days ago.

## 2021-06-29 NOTE — TELEPHONE ENCOUNTER
Called [facility] to try to get the results sent to [phone]. Mora Moralez,

## 2021-07-06 NOTE — TELEPHONE ENCOUNTER
"Requested Prescriptions   Pending Prescriptions Disp Refills     omeprazole (PRILOSEC) 20 MG DR capsule 90 capsule      Sig: Take 1 capsule (20 mg) by mouth daily       PPI Protocol Failed - 7/6/2021  8:26 AM        Failed - Medication is active on med list        Passed - Not on Clopidogrel (unless Pantoprazole ordered)        Passed - No diagnosis of osteoporosis on record        Passed - Recent (12 mo) or future (30 days) visit within the authorizing provider's specialty     Patient has had an office visit with the authorizing provider or a provider within the authorizing providers department within the previous 12 mos or has a future within next 30 days. See \"Patient Info\" tab in inbasket, or \"Choose Columns\" in Meds & Orders section of the refill encounter.              Passed - Patient is age 18 or older           Routing refill request to provider for review/approval because:  Drug not active on patient's medication list  Medication is reported/historical        "

## 2021-07-06 NOTE — TELEPHONE ENCOUNTER
Patient called back and was informed of the provider's recommendation.    Patient reports that this is the 2nd time he has had the Schatzki ring dilation procedure.    He is inquiring as to the cause of the Schatzki ring and if he will need to have this procedure repeated in the future.    Patient is requesting refill for Omeprazole (Prilosec): He is unsure about his current dose.

## 2021-09-18 ENCOUNTER — HEALTH MAINTENANCE LETTER (OUTPATIENT)
Age: 41
End: 2021-09-18

## 2021-10-05 ENCOUNTER — OFFICE VISIT (OUTPATIENT)
Dept: FAMILY MEDICINE | Facility: CLINIC | Age: 41
End: 2021-10-05
Payer: COMMERCIAL

## 2021-10-05 VITALS
WEIGHT: 236 LBS | SYSTOLIC BLOOD PRESSURE: 118 MMHG | OXYGEN SATURATION: 97 % | BODY MASS INDEX: 37.93 KG/M2 | HEIGHT: 66 IN | TEMPERATURE: 99 F | HEART RATE: 87 BPM | DIASTOLIC BLOOD PRESSURE: 80 MMHG

## 2021-10-05 DIAGNOSIS — Z13.220 SCREENING FOR HYPERLIPIDEMIA: ICD-10-CM

## 2021-10-05 DIAGNOSIS — Z80.0 FAMILY HISTORY OF COLON CANCER: ICD-10-CM

## 2021-10-05 DIAGNOSIS — Z11.4 SCREENING FOR HIV (HUMAN IMMUNODEFICIENCY VIRUS): ICD-10-CM

## 2021-10-05 DIAGNOSIS — Z11.59 NEED FOR HEPATITIS C SCREENING TEST: ICD-10-CM

## 2021-10-05 DIAGNOSIS — Z23 NEED FOR PROPHYLACTIC VACCINATION AND INOCULATION AGAINST INFLUENZA: ICD-10-CM

## 2021-10-05 DIAGNOSIS — R10.13 EPIGASTRIC PAIN: Primary | ICD-10-CM

## 2021-10-05 DIAGNOSIS — E66.01 MORBID OBESITY (H): ICD-10-CM

## 2021-10-05 PROCEDURE — 90686 IIV4 VACC NO PRSV 0.5 ML IM: CPT | Performed by: PHYSICIAN ASSISTANT

## 2021-10-05 PROCEDURE — 90472 IMMUNIZATION ADMIN EACH ADD: CPT | Performed by: PHYSICIAN ASSISTANT

## 2021-10-05 PROCEDURE — 86803 HEPATITIS C AB TEST: CPT | Performed by: PHYSICIAN ASSISTANT

## 2021-10-05 PROCEDURE — 80053 COMPREHEN METABOLIC PANEL: CPT | Performed by: PHYSICIAN ASSISTANT

## 2021-10-05 PROCEDURE — 99214 OFFICE O/P EST MOD 30 MIN: CPT | Mod: 25 | Performed by: PHYSICIAN ASSISTANT

## 2021-10-05 PROCEDURE — 90746 HEPB VACCINE 3 DOSE ADULT IM: CPT | Performed by: PHYSICIAN ASSISTANT

## 2021-10-05 PROCEDURE — 80061 LIPID PANEL: CPT | Performed by: PHYSICIAN ASSISTANT

## 2021-10-05 PROCEDURE — 82150 ASSAY OF AMYLASE: CPT | Performed by: PHYSICIAN ASSISTANT

## 2021-10-05 PROCEDURE — 36415 COLL VENOUS BLD VENIPUNCTURE: CPT | Performed by: PHYSICIAN ASSISTANT

## 2021-10-05 PROCEDURE — 83690 ASSAY OF LIPASE: CPT | Performed by: PHYSICIAN ASSISTANT

## 2021-10-05 PROCEDURE — 90471 IMMUNIZATION ADMIN: CPT | Performed by: PHYSICIAN ASSISTANT

## 2021-10-05 ASSESSMENT — ANXIETY QUESTIONNAIRES
7. FEELING AFRAID AS IF SOMETHING AWFUL MIGHT HAPPEN: NOT AT ALL
IF YOU CHECKED OFF ANY PROBLEMS ON THIS QUESTIONNAIRE, HOW DIFFICULT HAVE THESE PROBLEMS MADE IT FOR YOU TO DO YOUR WORK, TAKE CARE OF THINGS AT HOME, OR GET ALONG WITH OTHER PEOPLE: NOT DIFFICULT AT ALL
GAD7 TOTAL SCORE: 1
2. NOT BEING ABLE TO STOP OR CONTROL WORRYING: NOT AT ALL
5. BEING SO RESTLESS THAT IT IS HARD TO SIT STILL: NOT AT ALL
3. WORRYING TOO MUCH ABOUT DIFFERENT THINGS: NOT AT ALL
1. FEELING NERVOUS, ANXIOUS, OR ON EDGE: NOT AT ALL
6. BECOMING EASILY ANNOYED OR IRRITABLE: SEVERAL DAYS

## 2021-10-05 ASSESSMENT — PATIENT HEALTH QUESTIONNAIRE - PHQ9
5. POOR APPETITE OR OVEREATING: NOT AT ALL
SUM OF ALL RESPONSES TO PHQ QUESTIONS 1-9: 3

## 2021-10-05 ASSESSMENT — MIFFLIN-ST. JEOR: SCORE: 1908.24

## 2021-10-05 NOTE — PROGRESS NOTES
Assessment & Plan     Epigastric pain  Suspect gallbladder cause.  Follow up when labs and imaging is done.    - Amylase; Future  - Lipase; Future  - Comprehensive metabolic panel (BMP + Alb, Alk Phos, ALT, AST, Total. Bili, TP); Future  - US Abdomen Limited; Future    Morbid obesity (H)  Start with food diary and increase in exercise         Need for hepatitis C screening test  Follow up as needed  - Hepatitis C Screen Reflex to HCV RNA Quant and Genotype; Future    Screening for hyperlipidemia  Follow up as needed  - Lipid panel reflex to direct LDL Non-fasting; Future    Need for prophylactic vaccination and inoculation against influenza      Family history of colon cancer  Due for repeat colonoscopy   - Adult Gastro Ref - Procedure Only; Future                 No follow-ups on file.    LORI Olvera [facility] JAQUELINE Dexter is a 41 year old who presents for the following health issues     HPI     Abdominal/Flank Pain and sometimes pain in Lower back,requesting kidney function test   Onset/Duration: x 2 months   Has had some stomach pain since this summer.  This pain feels a little different.    Description:   Character: Burning bloating pain   Location: freddy-umbilical region  Radiation: to L abdomen   Intensity: 3-4/10  Progression of Symptoms:  same  Accompanying Signs & Symptoms:  Fever/Chills: no  Gas/Bloating: YES  Nausea: no  Vomitting: no  Diarrhea: no  Constipation: no  Dysuria or Hematuria: no   History:   Trauma: no  Previous similar pain: no  Previous tests done: Upper Endoscopy 2021 -normal except Schatzki ring and hiatal hernia   Precipitating factors:   Does the pain change with:     Food: YES-fried food and spicy food it makes it worse     Bowel Movement: YES-normal regular BM    Urination: no   Other factors:  no  Therapies tried and outcome: omeprazole -some relief   Also feels better with urination  Started after covid   No other urinary  "symptoms     Grandfather had stomach cancer and just passed away  No surgeries on abdomen           Review of Systems   As above      Objective    /80   Pulse 87   Temp 99  F (37.2  C) (Oral)   Ht 1.676 m (5' 6\")   Wt 107 kg (236 lb)   SpO2 97%   BMI 38.09 kg/m    Body mass index is 38.09 kg/m .  Physical Exam  Constitutional:       Appearance: He is obese.   Cardiovascular:      Rate and Rhythm: Normal rate and regular rhythm.   Pulmonary:      Effort: Pulmonary effort is normal.      Breath sounds: Normal breath sounds.   Chest:      Comments: Chest wall pain right mid axillary line   Abdominal:      General: Bowel sounds are normal.      Tenderness: There is abdominal tenderness (epigastric ).      Hernia: A hernia (umblical ) is present.   Neurological:      Mental Status: He is alert.                          "

## 2021-10-06 ENCOUNTER — TELEPHONE (OUTPATIENT)
Dept: FAMILY MEDICINE | Facility: CLINIC | Age: 41
End: 2021-10-06

## 2021-10-06 LAB
ALBUMIN SERPL-MCNC: 3.7 G/DL (ref 3.4–5)
ALP SERPL-CCNC: 64 U/L (ref 40–150)
ALT SERPL W P-5'-P-CCNC: 31 U/L (ref 0–70)
AMYLASE SERPL-CCNC: 62 U/L (ref 30–110)
ANION GAP SERPL CALCULATED.3IONS-SCNC: 4 MMOL/L (ref 3–14)
AST SERPL W P-5'-P-CCNC: 14 U/L (ref 0–45)
BILIRUB SERPL-MCNC: 0.2 MG/DL (ref 0.2–1.3)
BUN SERPL-MCNC: 12 MG/DL (ref 7–30)
CALCIUM SERPL-MCNC: 9.1 MG/DL (ref 8.5–10.1)
CHLORIDE BLD-SCNC: 108 MMOL/L (ref 94–109)
CHOLEST SERPL-MCNC: 212 MG/DL
CO2 SERPL-SCNC: 30 MMOL/L (ref 20–32)
CREAT SERPL-MCNC: 0.69 MG/DL (ref 0.66–1.25)
FASTING STATUS PATIENT QL REPORTED: NO
GFR SERPL CREATININE-BSD FRML MDRD: >90 ML/MIN/1.73M2
GLUCOSE BLD-MCNC: 122 MG/DL (ref 70–99)
HCV AB SERPL QL IA: NONREACTIVE
HDLC SERPL-MCNC: 46 MG/DL
LDLC SERPL CALC-MCNC: 136 MG/DL
LIPASE SERPL-CCNC: 74 U/L (ref 73–393)
NONHDLC SERPL-MCNC: 166 MG/DL
POTASSIUM BLD-SCNC: 4.1 MMOL/L (ref 3.4–5.3)
PROT SERPL-MCNC: 7.8 G/DL (ref 6.8–8.8)
SODIUM SERPL-SCNC: 142 MMOL/L (ref 133–144)
TRIGL SERPL-MCNC: 150 MG/DL

## 2021-10-06 ASSESSMENT — ANXIETY QUESTIONNAIRES: GAD7 TOTAL SCORE: 1

## 2021-10-06 NOTE — TELEPHONE ENCOUNTER
Patient called and stated that he is unable to get into his mychart.    He is asking for his results.  I did tell him that provider needed to look at them first.    Talia,RN,BSN  Triage Nurse  [facility]

## 2021-10-06 NOTE — RESULT ENCOUNTER NOTE
Your results are normal.  Your final test results are pending.  Please check your chart again within 2 - 3 days. You will receive further instruction when your full test result panel is complete.     Nona Solis MD

## 2021-10-07 NOTE — RESULT ENCOUNTER NOTE
Isacc,    Your pancreas enzymes are normal. Your liver and kidney tests are normal.     Your cholesterol and blood glucose are fine.     Nona Solis MD    The 10-year ASCVD risk score (Cressonshereen SEXTON Jr., et al., 2013) is: 1.7%    Values used to calculate the score:      Age: 41 years      Sex: Male      Is Non- : No      Diabetic: No      Tobacco smoker: No      Systolic Blood Pressure: 118 mmHg      Is BP treated: No      HDL Cholesterol: 46 mg/dL      Total Cholesterol: 212 mg/dL

## 2021-10-07 NOTE — TELEPHONE ENCOUNTER
Please call patient:     Your pancreas enzymes are normal. Your liver and kidney tests are normal.      Your cholesterol and blood glucose are fine.      You can get assistance with [employer] and password help by calling the central [employer] line at [phone], with support 24 hours a day/7 days a week.  Nona Solis MD

## 2021-10-19 ENCOUNTER — TELEPHONE (OUTPATIENT)
Dept: FAMILY MEDICINE | Facility: CLINIC | Age: 41
End: 2021-10-19

## 2021-10-19 ENCOUNTER — HOSPITAL ENCOUNTER (OUTPATIENT)
Dept: ULTRASOUND IMAGING | Facility: CLINIC | Age: 41
Discharge: HOME | End: 2021-10-19
Attending: PHYSICIAN ASSISTANT | Admitting: PHYSICIAN ASSISTANT
Payer: COMMERCIAL

## 2021-10-19 DIAGNOSIS — R10.13 EPIGASTRIC PAIN: ICD-10-CM

## 2021-10-19 PROCEDURE — 76705 ECHO EXAM OF ABDOMEN: CPT

## 2021-10-19 NOTE — TELEPHONE ENCOUNTER
patient needed address to [facility] for his US. Warm transferred to image scheduling to give address.  Carol Galo RN  [facility]

## 2021-10-21 DIAGNOSIS — K22.2 ESOPHAGEAL STRICTURE: Primary | ICD-10-CM

## 2021-10-21 NOTE — TELEPHONE ENCOUNTER
"Patient calling for results of imaging. Patient notified of provider result note as written:     \"Your ultrasound is normal.  You could try increasing your omeprazole to 40mg daily for a month to see if that improves symptoms.  If not we might need to get another endoscopy.      Let me know if you have any questions.     Alana Robles PA-C\"      Patient needs new prescription sent for the 40 mg. Patient also worried about long term effects of this medication, especially at higher dose. Please advise.     Deepa Giles RN    "

## 2021-10-22 RX ORDER — OMEPRAZOLE 40 MG/1
40 CAPSULE, DELAYED RELEASE ORAL DAILY
Qty: 90 CAPSULE | Refills: 3 | Status: SHIPPED | OUTPATIENT
Start: 2021-10-22 | End: 2022-02-04 | Stop reason: DRUGHIGH

## 2021-11-13 ENCOUNTER — HEALTH MAINTENANCE LETTER (OUTPATIENT)
Age: 41
End: 2021-11-13

## 2022-01-09 ENCOUNTER — HOSPITAL ENCOUNTER (EMERGENCY)
Facility: CLINIC | Age: 42
Discharge: HOME | End: 2022-01-09
Attending: EMERGENCY MEDICINE | Admitting: EMERGENCY MEDICINE
Payer: COMMERCIAL

## 2022-01-09 VITALS
SYSTOLIC BLOOD PRESSURE: 135 MMHG | WEIGHT: 234 LBS | TEMPERATURE: 98.6 F | BODY MASS INDEX: 37.77 KG/M2 | HEART RATE: 89 BPM | RESPIRATION RATE: 16 BRPM | OXYGEN SATURATION: 97 % | DIASTOLIC BLOOD PRESSURE: 94 MMHG

## 2022-01-09 DIAGNOSIS — H60.502 ACUTE OTITIS EXTERNA OF LEFT EAR, UNSPECIFIED TYPE: ICD-10-CM

## 2022-01-09 PROCEDURE — 99284 EMERGENCY DEPT VISIT MOD MDM: CPT | Performed by: EMERGENCY MEDICINE

## 2022-01-09 PROCEDURE — 99283 EMERGENCY DEPT VISIT LOW MDM: CPT | Performed by: EMERGENCY MEDICINE

## 2022-01-09 PROCEDURE — 250N000013 HC RX MED GY IP 250 OP 250 PS 637: Performed by: EMERGENCY MEDICINE

## 2022-01-09 RX ORDER — IBUPROFEN 600 MG/1
600 TABLET, FILM COATED ORAL ONCE
Status: COMPLETED | OUTPATIENT
Start: 2022-01-09 | End: 2022-01-09

## 2022-01-09 RX ADMIN — IBUPROFEN 600 MG: 600 TABLET ORAL at 06:15

## 2022-01-09 NOTE — DISCHARGE INSTRUCTIONS
Follow-up with your primary care physician in 1 week.  If unable to make appointment with primary care or this becomes a chronic issue, you can follow-up with our ear nose and throat clinic.  Number is listed below.  Return to the ED if you develop any worsening ear pain, drainage, hearing problems, or fever.  Do not attempt to clean or put anything in your ear.  Use ibuprofen (600 mg 3 times a day) for inflammation/pain.      [facility] (phone: [phone])

## 2022-01-10 ASSESSMENT — ENCOUNTER SYMPTOMS
CHILLS: 0
SHORTNESS OF BREATH: 0
TROUBLE SWALLOWING: 0
ABDOMINAL PAIN: 0
SORE THROAT: 0
FEVER: 0
VOICE CHANGE: 0
HEADACHES: 0
NECK PAIN: 0
FACIAL SWELLING: 0
DIFFICULTY URINATING: 0
SINUS PRESSURE: 0
SINUS PAIN: 0
VOMITING: 0
RHINORRHEA: 0
BACK PAIN: 0
NERVOUS/ANXIOUS: 0
NAUSEA: 0

## 2022-01-10 NOTE — ED PROVIDER NOTES
ED Provider Note  [facility]      History     Chief Complaint   Patient presents with     Otalgia     L otalgia x 2 days. Denies hearing changes.     HPI  Isacc Wang is a 41 year old male with history of GERD, hyperlipidemia, presents to the ED for evaluation of left ear pain.  Notes pain started gradually approximately 2 days ago.  Has been constant since onset.  Nothing makes it better or worse.  He notes that he has been cleaning his ears frequently, although he admits it may be too frequent.  No history of recurrent ear infections.  No pediatric tympanostomy or other ear procedures.  Denies any hearing loss.  Denies any fever/chills, ear drainage, neck pain, difficulty swallowing, sore throat, foreign body sensation, and has no other medical complaints.        Past Medical History  Past Medical History:   Diagnosis Date     Esophageal reflux      Esophageal stricture 10/07/2020    benign esophageal stricture dilated at [facility]     History of 2019 novel coronavirus disease (COVID-19) 04/30/2020    covid 19 infection (POS pCR) ; no sequelae     Hyperlipidemia LDL goal <130 10/09/2009     Obesity 10/21/2009     Past Surgical History:   Procedure Laterality Date     COLONOSCOPY WITH CO2 INSUFFLATION N/A 10/26/2017    Procedure: COLONOSCOPY WITH CO2 INSUFFLATION;  COLON SCREEN/ FAMILY HX/ SPECIAL SCREENING FOR MALIGNANT NEOPLASMS/ ZOWNIROWYCZ;  Surgeon: Alfonso Ortega MD;  Location:  OR     [facility] NONSPECIFIC PROCEDURE      cyst removed right ear     ciprofloxacin-hydrocortisone (CIPRO HC OTIC) 0.2-1 % otic suspension  omeprazole (PRILOSEC) 20 MG DR capsule  omeprazole (PRILOSEC) 40 MG DR capsule      Allergies   Allergen Reactions     Seasonal Allergies      Family History  Family History   Problem Relation Age of Onset     Hypertension Mother      Cancer Mother         cervical cancer     Depression Mother      Thyroid Disease Mother      Diabetes Father      Hypertension  Father      Cerebrovascular Disease Father      Depression Father      Heart Disease Father      Lipids Father      Colon Cancer Father      Colon Cancer Maternal Grandmother      Colon Cancer Maternal Uncle      Social History   Social History     Tobacco Use     Smoking status: Never Smoker     Smokeless tobacco: Never Used   Substance Use Topics     Alcohol use: Not Currently     Drug use: No      Past medical history, past surgical history, medications, allergies, family history, and social history were reviewed with the patient. No additional pertinent items.       Review of Systems   Constitutional: Negative for chills and fever.   HENT: Positive for ear pain. Negative for congestion, drooling, ear discharge, facial swelling, hearing loss, postnasal drip, rhinorrhea, sinus pressure, sinus pain, sneezing, sore throat, tinnitus, trouble swallowing and voice change.    Respiratory: Negative for shortness of breath.    Cardiovascular: Negative for chest pain.   Gastrointestinal: Negative for abdominal pain, nausea and vomiting.   Genitourinary: Negative for difficulty urinating.   Musculoskeletal: Negative for back pain and neck pain.   Neurological: Negative for headaches.   Psychiatric/Behavioral: The patient is not nervous/anxious.      A complete review of systems was performed with pertinent positives and negatives noted in the HPI, and all other systems negative.    Physical Exam   BP: (!) 135/94  Pulse: 89  Temp: 98.6  F (37  C)  Resp: 16  Weight: 106.1 kg (234 lb)  SpO2: 97 %  Physical Exam  Vitals and nursing note reviewed.   Constitutional:       General: He is not in acute distress.     Appearance: Normal appearance.   HENT:      Head: Normocephalic.      Right Ear: Tympanic membrane, ear canal and external ear normal. There is no impacted cerumen.      Ears:      Comments: Moderate inflammation/erythema of the external auditory canal.  No active drainage.  Consistent with otitis externa.  Tympanic  membrane is clear and nonbulging.  No foreign bodies.     Nose: Nose normal.   Eyes:      Pupils: Pupils are equal, round, and reactive to light.   Cardiovascular:      Rate and Rhythm: Normal rate and regular rhythm.   Pulmonary:      Effort: Pulmonary effort is normal.   Abdominal:      General: There is no distension.   Musculoskeletal:         General: No deformity. Normal range of motion.      Cervical back: Normal range of motion.   Skin:     General: Skin is warm.   Neurological:      Mental Status: He is alert and oriented to person, place, and time.   Psychiatric:         Mood and Affect: Mood normal.       ED Course      Procedures       The medical record was reviewed and interpreted.  Managed outpatient prescription medications.              No results found for any visits on 01/09/22.  Medications   ibuprofen (ADVIL/MOTRIN) tablet 600 mg (600 mg Oral Given 1/9/22 0615)        Assessments & Plan (with Medical Decision Making)   Patient presents to the ED for evaluation of left ear pain.  Differential diagnose includes otitis media, otitis externa, malignant otitis externa, lab otitis.    On arrival, patient is well-appearing.  Has normal vital signs.  Overall nontoxic.  Right ear examination is unremarkable.  Major ENT examination is unremarkable.  Left ear shows inflammation/erythema of the external auditory canal consistent with otitis externa.  The tympanic membrane is normal.  Given well appearance, lack of risk factors, low suspicion for malignant otitis externa.    Symptoms likely caused by digital trauma as patient reports excessive ear cleaning.  However, will treat with otic steroid/antibiotics.  Prescription for ciprofloxacin/hydrocortisone otic drops given.  Recommend follow-up with PCP.  May need ENT follow-up becomes chronic issue.  Educated reasons to return to the ED.      I have reviewed the nursing notes. I have reviewed the findings, diagnosis, plan and need for follow up with the  patient.    Discharge Medication List as of 1/9/2022  6:04 AM      START taking these medications    Details   ciprofloxacin-hydrocortisone (CIPRO HC OTIC) 0.2-1 % otic suspension Place 3 drops Into the left ear 2 times daily for 7 days, Disp-10 mL, R-0, Local Print             Final diagnoses:   Acute otitis externa of left ear, unspecified type       --  Grady Lange DO  [facility] EMERGENCY DEPARTMENT  1/9/2022     Grady Lange DO  01/10/22 1528

## 2022-01-27 ENCOUNTER — TELEPHONE (OUTPATIENT)
Dept: FAMILY MEDICINE | Facility: CLINIC | Age: 42
End: 2022-01-27
Payer: COMMERCIAL

## 2022-01-27 NOTE — TELEPHONE ENCOUNTER
Received call from patient. He stated that he is having continued discomfort with swallowing. He has no red flag symptoms but would like to see what next steps he should take in order to get this addressed. He had an upper GI endoscopy June 2021 without any further indication into cause/next steps.    Scheduled with Dr. Heck next week.    HARPER TelloN RN  [facility]

## 2022-02-04 ENCOUNTER — OFFICE VISIT (OUTPATIENT)
Dept: FAMILY MEDICINE | Facility: CLINIC | Age: 42
End: 2022-02-04
Payer: COMMERCIAL

## 2022-02-04 VITALS
SYSTOLIC BLOOD PRESSURE: 130 MMHG | OXYGEN SATURATION: 95 % | HEIGHT: 66 IN | BODY MASS INDEX: 38.25 KG/M2 | DIASTOLIC BLOOD PRESSURE: 88 MMHG | HEART RATE: 77 BPM | TEMPERATURE: 98.6 F | RESPIRATION RATE: 18 BRPM | WEIGHT: 238 LBS

## 2022-02-04 DIAGNOSIS — R73.03 PREDIABETES: ICD-10-CM

## 2022-02-04 DIAGNOSIS — R07.0 THROAT DISCOMFORT: Primary | ICD-10-CM

## 2022-02-04 DIAGNOSIS — E66.01 MORBID OBESITY (H): ICD-10-CM

## 2022-02-04 DIAGNOSIS — K42.9 UMBILICAL HERNIA WITHOUT OBSTRUCTION AND WITHOUT GANGRENE: ICD-10-CM

## 2022-02-04 DIAGNOSIS — R13.12 OROPHARYNGEAL DYSPHAGIA: ICD-10-CM

## 2022-02-04 DIAGNOSIS — K22.2 SCHATZKI'S RING: ICD-10-CM

## 2022-02-04 DIAGNOSIS — R35.0 URINARY FREQUENCY: ICD-10-CM

## 2022-02-04 DIAGNOSIS — K21.00 GASTROESOPHAGEAL REFLUX DISEASE WITH ESOPHAGITIS WITHOUT HEMORRHAGE: ICD-10-CM

## 2022-02-04 LAB
ALBUMIN UR-MCNC: NEGATIVE MG/DL
APPEARANCE UR: CLEAR
BILIRUB UR QL STRIP: NEGATIVE
COLOR UR AUTO: YELLOW
FASTING STATUS PATIENT QL REPORTED: NO
GLUCOSE BLD-MCNC: 91 MG/DL (ref 70–99)
GLUCOSE UR STRIP-MCNC: NEGATIVE MG/DL
HBA1C MFR BLD: 5.6 % (ref 0–5.6)
HGB UR QL STRIP: NEGATIVE
KETONES UR STRIP-MCNC: NEGATIVE MG/DL
LEUKOCYTE ESTERASE UR QL STRIP: NEGATIVE
NITRATE UR QL: NEGATIVE
PH UR STRIP: 6 [PH] (ref 5–7)
SP GR UR STRIP: 1.02 (ref 1–1.03)
UROBILINOGEN UR STRIP-ACNC: 0.2 E.U./DL

## 2022-02-04 PROCEDURE — 99214 OFFICE O/P EST MOD 30 MIN: CPT | Performed by: FAMILY MEDICINE

## 2022-02-04 PROCEDURE — 81003 URINALYSIS AUTO W/O SCOPE: CPT | Performed by: FAMILY MEDICINE

## 2022-02-04 PROCEDURE — 83036 HEMOGLOBIN GLYCOSYLATED A1C: CPT | Performed by: FAMILY MEDICINE

## 2022-02-04 PROCEDURE — 82947 ASSAY GLUCOSE BLOOD QUANT: CPT | Performed by: FAMILY MEDICINE

## 2022-02-04 PROCEDURE — 36415 COLL VENOUS BLD VENIPUNCTURE: CPT | Performed by: FAMILY MEDICINE

## 2022-02-04 ASSESSMENT — MIFFLIN-ST. JEOR: SCORE: 1919.56

## 2022-02-04 ASSESSMENT — PAIN SCALES - GENERAL: PAINLEVEL: NO PAIN (0)

## 2022-02-04 NOTE — PROGRESS NOTES
"  Assessment & Plan   Isacc is a 41 yo M with hpld, obesity, gou, esophageal stritcutre, fam hx colon cancer    Throat discomfort    Discussed differentials: pnd, gerd, tonsillar disorder. Exam is benign, due to persistence of symptoms will refer for ENT evaluation  - Otolaryngology Referral; Future    Oropharyngeal dysphagia  - Otolaryngology Referral; Future    Umbilical hernia without obstruction and without gangrene    -  Asymptomatic.    Urinary frequency    - UA Macro with Reflex to Micro and Culture - lab collect; Future  - UA Macro with Reflex to Micro and Culture - lab collect    Morbid obesity (H)   -   Counseled to make better food choices, exercise as tolerated, and lose weight.     Gastroesophageal reflux disease with esophagitis without hemorrhage   Reduce does of omeprazole to 20 mg daily  - omeprazole (PRILOSEC) 20 MG DR capsule; Take 1 capsule (20 mg) by mouth daily    Schatzki's ring  - omeprazole (PRILOSEC) 20 MG DR capsule; Take 1 capsule (20 mg) by mouth daily    Prediabetes  - Hemoglobin A1c; Future  - UA Macro with Reflex to Micro and Culture - lab collect; Future  - Glucose; Future  - Glucose  - UA Macro with Reflex to Micro and Culture - lab collect  - Hemoglobin A1c :[id number]}     BMI:   Estimated body mass index is 38.25 kg/m  as calculated from the following:    Height as of this encounter: 1.68 m (5' 6.14\").    Weight as of this encounter: 108 kg (238 lb).           Return for follow up with results.    Alfonso Heck MD  [facility] JAQUELINE Dexter is a 41 year old who presents for the following health issues   HPI   Discuss side effects from medications   Chief Complaint   Patient presents with     Discuss Swallowing Issues     See encounter 1/27/2022     Throat discomfort:  Discomfort back of throat: when swallows feels like weird; like there is something.  A lot of clearing the throat.  Irritation when drinking coffee or any caffeine; feels " "tight.  No change in voice.  Had Covid: 2 years ago.    Umbilical hernia:   Hurts a little when cough..    Weight:  Wt Readings from Last 4 Encounters:   02/04/22 108 kg (238 lb)   01/09/22 106.1 kg (234 lb)   10/05/21 107 kg (236 lb)   06/07/21 106.6 kg (235 lb)     Frequency:   Goes more often than usual, with urgency. Urine is clear.   FHx DM2; Dad DM2 and Heart murmur.      Review of Systems   Constitutional, HEENT, cardiovascular, pulmonary and gu systems are negative, except as otherwise noted.      Objective    BP (!) 128/98 (BP Location: Left arm, Cuff Size: Adult Regular)   Pulse 77   Temp 98.6  F (37  C) (Oral)   Resp 18   Ht 1.68 m (5' 6.14\")   Wt 108 kg (238 lb)   SpO2 95%   BMI 38.25 kg/m    Body mass index is 38.25 kg/m .  Physical Exam   GENERAL: healthy, alert and no distress  RESP: lungs clear to auscultation - no rales, rhonchi or wheezes  CV: regular rate and rhythm, no murmur, click or rub, no peripheral edema   ABDOMEN: soft, nontender, no masses and bowel sounds normal  MS: no gross musculoskeletal defects noted, no edema  PSYCH: mentation appears normal, affect normal/bright    "

## 2022-02-09 NOTE — TELEPHONE ENCOUNTER
FUTURE VISIT INFORMATION      FUTURE VISIT INFORMATION:    Date: 5/17/22    Time: 2PM    Location: [facility]  REFERRAL INFORMATION:    Referring provider:  Alfonso Heck MD    Referring providers clinic:  [facility]    Reason for visit/diagnosis  oropharyngeal dysphagia - Referred by Alfonso Heck MD in  FAMILY PRACTICE    RECORDS REQUESTED FROM:       Clinic name Comments Records Status Imaging Status   [facility] 2/4/22, 6/7/21 note and referral from Alfonso Heck MD [facility]    Imaging 8/27/2020 XR Chest  7/16/2020 XR Chest and XR Neck  Epic PACS   [facility]  6/21/21 Upper GI Endoscopy   [facility]  10/4/2020 Upper Gi Endoscopy    Scanned in [facility]   8/18/2020 ED note from Ronan Kinsey PA   Care everywhere    [facility]  8/19/2020 CTA Chest and XR Chest  Care everywhere  req 2/9/22 - PACS           2/9/22 9:42AM sent a fax to [facility] for images - [facility]   2/25/22 2:48PM images received in PACS - [facility]

## 2022-05-14 ENCOUNTER — HOSPITAL ENCOUNTER (EMERGENCY)
Facility: CLINIC | Age: 42
Discharge: HOME | End: 2022-05-14
Attending: INTERNAL MEDICINE | Admitting: INTERNAL MEDICINE
Payer: COMMERCIAL

## 2022-05-14 VITALS
OXYGEN SATURATION: 98 % | DIASTOLIC BLOOD PRESSURE: 99 MMHG | TEMPERATURE: 98.5 F | SYSTOLIC BLOOD PRESSURE: 149 MMHG | RESPIRATION RATE: 16 BRPM | HEART RATE: 76 BPM

## 2022-05-14 DIAGNOSIS — H60.501 ACUTE OTITIS EXTERNA OF RIGHT EAR, UNSPECIFIED TYPE: ICD-10-CM

## 2022-05-14 PROCEDURE — 99283 EMERGENCY DEPT VISIT LOW MDM: CPT | Performed by: INTERNAL MEDICINE

## 2022-05-14 RX ORDER — NEOMYCIN SULFATE, POLYMYXIN B SULFATE, HYDROCORTISONE 3.5; 10000; 1 MG/ML; [USP'U]/ML; MG/ML
3 SOLUTION/ DROPS AURICULAR (OTIC) 4 TIMES DAILY
Qty: 10 ML | Refills: 0 | Status: SHIPPED | OUTPATIENT
Start: 2022-05-14 | End: 2022-05-21

## 2022-05-14 ASSESSMENT — ENCOUNTER SYMPTOMS
SHORTNESS OF BREATH: 0
FEVER: 0
ABDOMINAL PAIN: 0

## 2022-05-14 NOTE — ED PROVIDER NOTES
[facility] EMERGENCY DEPARTMENT ([facility])    5/14/22     [facility] 4   1:21 PM   History     Chief Complaint   Patient presents with     Ear Fullness     Ear infection; hx of;; sore into the jaw: over a week.     The history is provided by the patient and medical records.     Isacc Wang Jr. is a 41 year old male who presents with bilateral ear pain for the past week. Pain is worse in his right ear compared to his left. He has some irritation behind his ear as well.  He used some over-the-counter drops for this without improvement. He states this is from allergies. Patient denies any cold symptoms prior to this ear pain.     Epic records reviewed.  He has a history of left ear otitis external for she was seen in the emergency department on 1/9/2022.  At the time he was prescribed Cipro hydrocortisone drops.       Past Medical History  Past Medical History:   Diagnosis Date     Esophageal reflux      Esophageal stricture 10/07/2020    benign esophageal stricture dilated at [facility]     History of 2019 novel coronavirus disease (COVID-19) 04/30/2020    covid 19 infection (POS pCR) ; no sequelae     Hyperlipidemia LDL goal <130 10/09/2009     Obesity 10/21/2009     Past Surgical History:   Procedure Laterality Date     COLONOSCOPY WITH CO2 INSUFFLATION N/A 10/26/2017    Procedure: COLONOSCOPY WITH CO2 INSUFFLATION;  COLON SCREEN/ FAMILY HX/ SPECIAL SCREENING FOR MALIGNANT NEOPLASMS/ ZOWNIROWYCZ;  Surgeon: Alfonso Ortega MD;  Location:  OR     [facility] NONSPECIFIC PROCEDURE      cyst removed right ear     neomycin-polymyxin-hydrocortisone (CORTISPORIN) 3.5-41081-3 otic solution  omeprazole (PRILOSEC) 20 MG DR capsule      Allergies   Allergen Reactions     Seasonal Allergies      Family History  Family History   Problem Relation Age of Onset     Hypertension Mother      Cancer Mother         cervical cancer     Depression Mother      Thyroid Disease Mother      Diabetes Father      Hypertension Father       Cerebrovascular Disease Father      Depression Father      Heart Disease Father      Lipids Father      Colon Cancer Father      Colon Cancer Maternal Grandmother      Colon Cancer Maternal Uncle      Social History   Social History     Tobacco Use     Smoking status: Never Smoker     Smokeless tobacco: Never Used   Substance Use Topics     Alcohol use: Not Currently     Drug use: No      Past medical history, past surgical history, medications, allergies, family history, and social history were reviewed with the patient. No additional pertinent items.       Review of Systems   Constitutional: Negative for fever.   HENT: Positive for ear pain.    Respiratory: Negative for shortness of breath.    Cardiovascular: Negative for chest pain.   Gastrointestinal: Negative for abdominal pain.   All other systems reviewed and are negative.    A complete review of systems was performed with pertinent positives and negatives noted in the HPI, and all other systems negative.    Physical Exam   BP: (!) 149/99  Pulse: 76  Temp: 98.5  F (36.9  C)  Resp: 16  SpO2: 98 %  Physical Exam  Constitutional:       General: He is not in acute distress.     Appearance: He is not diaphoretic.   HENT:      Head: Atraumatic.      Right Ear: Tympanic membrane normal. Swelling present. No tenderness. No middle ear effusion. There is no impacted cerumen. No foreign body. No mastoid tenderness. Tympanic membrane is not injected, scarred, perforated, erythematous, retracted or bulging.      Left Ear: Tympanic membrane, ear canal and external ear normal. There is no impacted cerumen.   Eyes:      General: No scleral icterus.     Pupils: Pupils are equal, round, and reactive to light.   Cardiovascular:      Heart sounds: Normal heart sounds.   Pulmonary:      Effort: No respiratory distress.      Breath sounds: Normal breath sounds.   Abdominal:      General: Bowel sounds are normal.      Palpations: Abdomen is soft.      Tenderness: There is no  abdominal tenderness.   Musculoskeletal:         General: No tenderness.   Skin:     General: Skin is warm.      Findings: No rash.         ED Course      Procedures                     No results found for any visits on 05/14/22.  Medications - No data to display     Assessments & Plan (with Medical Decision Making)  Right AOE, will start cortisporin otic solution, follow up with his PMD in no improvements in 5-10 days.       I have reviewed the nursing notes. I have reviewed the findings, diagnosis, plan and need for follow up with the patient.    Discharge Medication List as of 5/14/2022  1:27 PM      START taking these medications    Details   neomycin-polymyxin-hydrocortisone (CORTISPORIN) 3.5-38408-8 otic solution Place 3 drops into the right ear 4 times daily for 7 days, Disp-10 mL, R-0, Local Print             Final diagnoses:   Acute otitis externa of right ear, unspecified type     I, Anyi Dean, am serving as a trained medical scribe to document services personally performed by Amadou Calvillo MD based on the provider's statements to me on March 20, 2018.  This document has been checked and approved by the attending provider.    I, Amadou Calvillo MD, was physically present and have reviewed and verified the accuracy of this note documented by Anyi Dean, medical scribe.      Amadou Calvillo MD       [facility] EMERGENCY DEPARTMENT  5/14/2022     Amadou Calvillo MD  05/14/22 1556

## 2022-05-16 ENCOUNTER — TELEPHONE (OUTPATIENT)
Dept: OTOLARYNGOLOGY | Facility: CLINIC | Age: 42
End: 2022-05-16
Payer: COMMERCIAL

## 2022-05-16 NOTE — TELEPHONE ENCOUNTER
Writer attempted to contact patient to confirm appointment with Dr. Walker 5/17/22 2:00 PM. Writer left a detailed MyChart message for the patient.    Hayden Del Cid, EMT

## 2022-05-17 ENCOUNTER — THERAPY VISIT (OUTPATIENT)
Dept: SPEECH THERAPY | Facility: CLINIC | Age: 42
End: 2022-05-17
Payer: COMMERCIAL

## 2022-05-17 ENCOUNTER — OFFICE VISIT (OUTPATIENT)
Dept: OTOLARYNGOLOGY | Facility: CLINIC | Age: 42
End: 2022-05-17
Payer: COMMERCIAL

## 2022-05-17 ENCOUNTER — OFFICE VISIT (OUTPATIENT)
Dept: OTOLARYNGOLOGY | Facility: CLINIC | Age: 42
End: 2022-05-17

## 2022-05-17 ENCOUNTER — PRE VISIT (OUTPATIENT)
Dept: OTOLARYNGOLOGY | Facility: CLINIC | Age: 42
End: 2022-05-17

## 2022-05-17 VITALS
HEIGHT: 66 IN | BODY MASS INDEX: 36.96 KG/M2 | OXYGEN SATURATION: 98 % | SYSTOLIC BLOOD PRESSURE: 151 MMHG | HEART RATE: 74 BPM | TEMPERATURE: 98.6 F | DIASTOLIC BLOOD PRESSURE: 104 MMHG | WEIGHT: 230 LBS

## 2022-05-17 DIAGNOSIS — R07.0 THROAT DISCOMFORT: ICD-10-CM

## 2022-05-17 DIAGNOSIS — R13.12 OROPHARYNGEAL DYSPHAGIA: ICD-10-CM

## 2022-05-17 DIAGNOSIS — R07.0 THROAT DISCOMFORT: Primary | ICD-10-CM

## 2022-05-17 DIAGNOSIS — R13.10 DYSPHAGIA, UNSPECIFIED TYPE: ICD-10-CM

## 2022-05-17 DIAGNOSIS — R09.89 CHRONIC THROAT CLEARING: ICD-10-CM

## 2022-05-17 DIAGNOSIS — K21.9 GASTROESOPHAGEAL REFLUX DISEASE WITHOUT ESOPHAGITIS: ICD-10-CM

## 2022-05-17 DIAGNOSIS — K22.2 ESOPHAGEAL STRICTURE: Primary | ICD-10-CM

## 2022-05-17 PROCEDURE — 92612 ENDOSCOPY SWALLOW (FEES) VID: CPT | Mod: GN | Performed by: OTOLARYNGOLOGY

## 2022-05-17 PROCEDURE — 92610 EVALUATE SWALLOWING FUNCTION: CPT | Mod: GN | Performed by: SPEECH-LANGUAGE PATHOLOGIST

## 2022-05-17 PROCEDURE — 92613 ENDOSCOPY SWALLOW (FEES) I&R: CPT | Performed by: OTOLARYNGOLOGY

## 2022-05-17 PROCEDURE — 99204 OFFICE O/P NEW MOD 45 MIN: CPT | Mod: 25 | Performed by: OTOLARYNGOLOGY

## 2022-05-17 PROCEDURE — 92524 BEHAVRAL QUALIT ANALYS VOICE: CPT | Mod: GN | Performed by: SPEECH-LANGUAGE PATHOLOGIST

## 2022-05-17 RX ORDER — NYSTATIN 100000/ML
SUSPENSION, ORAL (FINAL DOSE FORM) ORAL
Qty: 400 ML | Refills: 1 | Status: SHIPPED | OUTPATIENT
Start: 2022-05-17 | End: 2022-05-24

## 2022-05-17 RX ORDER — FLUTICASONE PROPIONATE 50 MCG
2 SPRAY, SUSPENSION (ML) NASAL 2 TIMES DAILY
Qty: 11.1 ML | Refills: 3 | Status: SHIPPED | OUTPATIENT
Start: 2022-05-17 | End: 2024-05-07

## 2022-05-17 ASSESSMENT — PAIN SCALES - GENERAL: PAINLEVEL: NO PAIN (0)

## 2022-05-17 NOTE — LETTER
"5/17/2022       RE: Isacc Wang Jr.  [address]  [place] [address]     Dear Colleague,    Thank you for referring your patient, Isacc Wang Jr., to the [facility] VOICE CLINIC [place] at [facility]. Please see a copy of my visit note below.    [facility] VOICE [facility]  Evaluation report    Clinician: Amandeep Cervantes M.M., M.A., CCC/SLP  Seen in conjunction with: Dr. Walker  Referring physician:  Dr. Oneill  Patient: Isacc Wang  Date of Visit: 5/17/2022    HISTORY  Chief complaint: Isacc Wang is a 41 year old gentleman presenting today for evaluation of cough/throat clearing, swallowing difficulties, and throat discomfort.    Chart Review: He has a history significant for GERD, Schatzki's ring, gout, and familial history of colon cancer.  When patient was seen by primary care on 2/4/2022 he complained of persistent throat discomfort and dysphagia.  He was referred to ENT for further evaluation and treatment as warranted and presents for this today.    CURRENT SYMPTOMS PER PATIENT REPORT:    COUGH/THROAT CLEARING    Onset and inciting incident: Started around COVID pandemic beginning but worsened 6 months ago following EGD    Progression: stable    Worsens with: reflux    Mucus sensation in his throat    States that this bothers him \"a little bit\" and he is mostly interested in understanding what is going on    SWALLOWING    Began around the start of COVID    Throat discomfort with swallowing    Describes it as \"tightness\"    More with solid foods    Not an issue with liquids    Feels dryness    ADDITIONAL    Significant Reflux    Improves with omeprazole    Not taking PPI on a regimen but rather using them symptomatically after symptom onset     Patient denies significant dyspnea, dysphonia and pain.     OTHER PERTINENT HISTORY    Otherwise unknown please see Dr. Walker's note for additional details    Past Medical History:   Diagnosis " Date     Esophageal reflux      Esophageal stricture 10/07/2020    benign esophageal stricture dilated at [facility]     History of 2019 novel coronavirus disease (COVID-19) 04/30/2020    covid 19 infection (POS pCR) ; no sequelae     Hyperlipidemia LDL goal <130 10/09/2009     Obesity 10/21/2009     Past Surgical History:   Procedure Laterality Date     COLONOSCOPY WITH CO2 INSUFFLATION N/A 10/26/2017    Procedure: COLONOSCOPY WITH CO2 INSUFFLATION;  COLON SCREEN/ FAMILY HX/ SPECIAL SCREENING FOR MALIGNANT NEOPLASMS/ ZOWNIROWYCZ;  Surgeon: Alfonso Ortega MD;  Location:  OR     [facility] NONSPECIFIC PROCEDURE      cyst removed right ear       OBJECTIVE  PATIENT REPORTED MEASURES    Patient Supplied Answers To EAT Questionnaire  Eating Assessment Tool (EAT-10) 5/17/2022   My swallowing problem has caused me to lose weight 1   My swallowing problem interferes with my ability to go out for meals 0   Swallowing liquids takes extra effort 0   Swallowing solids takes extra effort 0   Swallowing pills takes extra effort 2   Swallowing is painful 0   The pleasure of eating is affected by my swallowing 0   When I swallow food sticks in my throat 2   I cough when I eat 0   Swallowing is stressful 1   EAT-10 6     PERCEPTUAL EVALUATION (CPT 22070)  POSTURE / TENSION:     no overt tension visible    BREATHING:     appears within normal limits and adequate     LARYNGEAL PALPATION:     no significant tenderness    VOICE:    Roughness: Minimal    Breathiness: Minimal    Strain: Mild to moderate Intermittent    Loudness    Conversational speech:  WNL    Pitch:    Conversational speech:  WNL    Pitch glide:     Self-limited access to loft registration    Resonance:    Conversational speech:  Mild culdesac resonance    Singing vs. Speech:  Consistent across contexts    COUGH/THROAT CLEARING:    Occasional    Dry    LARYNGEAL EXAMINATION  Procedure: Flexible endoscopy with chip-tip technology without stroboscopy, right nostril;  topical anesthesia with 3% Lidocaine and 0.25% phenylephrine was not applied.   Performed by: Dr. Dixie Walker  The laryngeal and pharyngeal structures were evaluated for gross appearance, mobility, function, and focal lesions / abnormalities of the associated mucosa.    All findings were within normal limits with the exception of the following salient features:     Subtle white patchy irregularity of the posterior pharynx near the UES    Retroflexed omega shaped epiglottis    Essentially healthy vocal fold mucosa    With phonatory tasks there is epiglottic retroflexion and 4 way constriction of the supraglottis    Decreased hyperfunction with high flow / yawn sigh techniques and with clinician support patient was able to carry this over into speech    A screening swallow evaluation was completed today by my colleague Syl Mtz CCC-SLP and Dr. Walker please see their notes for additional details regarding this facet of the patient's evaluation    The laryngeal exam was reviewed with Mr. Wang, and I provided pertinent explanations, as well as written and oral information.    ASSESSMENT / PLAN  IMPRESSIONS: Isacc Wang is presenting today with throat discomfort/ tightness with swallowing and frequent throat clearing.  Today's evaluation demonstrates Chronic Throat Clearing (R68.89) and Throat Discomfort/tightness (R07.0) in the context of Gastroesophageal reflux disease and corresponding irritation/sensitivity of the laryngeal pharyngeal mucosa. Laryngeal evaluation shows essentially healthy vocal fold mucosa, but subtle patchy irregularity of the postcricoid region potentially indicative of fungal infection versus minor mucosal irritation from reflux.  Although there is a degree of supraglottic recruitment during phonatory tasks that may exacerbate existing irritation, poor correlation between symptoms and voice use, and clear correlation between patient's symptoms and sensation of reflux makes prioritization  of reflux management of higher importance at this time.  It is recommended that the patient go through formal reflux evaluation with gastroenterology including symptom correlation with pH probe.  In addition to this a course of antifungal medications is recommended to address possible laryngeal candidiasis.  Should he be significantly bothered by throat clearing even after empiric course of antifungal medications, and management of reflux is under control he was encouraged to follow-up with our clinic as upper airway focused speech therapy to address cough/throat clearing and laryngeal pharyngeal hypersensitivity may be warranted at that time.  Today's appointment is evaluation only.    TOTAL SERVICE TIME: 40 minutes  EVALUATION OF VOICE AND RESONANCE (85723)  NO CHARGE FACILITY FEE (07285)    Amandeep Cervantes M.M., M.A., CCC-SLP  Speech-Language Pathologist  Certificate of Vocology  [phone]    *this report was created in part through the use of computerized dictation software, and though reviewed following completion, some typographic errors may persist.  If there is confusion regarding any of this notes contents, please contact me for clarification.*        Again, thank you for allowing me to participate in the care of your patient.      Sincerely,    Theo Cervantes, SLP

## 2022-05-17 NOTE — PATIENT INSTRUCTIONS
1.  You were seen in the ENT Clinic today by . If you have any questions or concerns after your appointment, please call [phone]. Press option #1 for scheduling related needs. Press option #3 for Nurse advice.    2.   has recommended  the following:   - Xray video swallow exam and esophagram, to be done on same day as follow up with    - use Flonase spray as instructed, AFTER using neti pot. Instructions below. Use until your follow up with . presciption for flonase sent to your pharmacy    Neti Pot Instructions    A. Leaning over a sink, tilt your head sideways with your forehead and chin roughly level to avoid liquid flowing into your mouth.  B. Breathing through your open mouth, insert the spout of the saline-filled container into your upper nostril so that the liquid drains through the lower nostril  C. Clear your nostrils, then repeat the procedure, tilting your head sideways, on the other side.   -use nystatin swish and swallow as instructed. Prescription sent to your pharmacy    3.  Plan is to return to clinic on same day as Video swallow, date and time to be determined.      Kala Arteaga LPN  [phone]  [facility] - Otolaryngology

## 2022-05-17 NOTE — LETTER
2022       RE: Isacc Wang Jr.  [address]  [facility] [address]     Dear Colleague,    Thank you for referring your patient, Isacc Wang Jr., to the [facility] EAR NOSE AND TH[facility] CLINIC [place] at [facility]. Please see a copy of my visit note below.        [facility]   at the [facility]   Otolaryngology Clinic     Patient: Isacc Wang Jr.    MRN: [id number]    : 1980    Age/Gender: 41 year old male  Date of Service: 2022  Rendering Provider:   Dixie Walker MD     Referring Provider   PCP: [facility]  Referring Physician: Alfonso Heck MD  [address] [place] [address]  Reason for Consultation   Globus sensation, dysphagia  History   HISTORY OF PRESENT ILLNESS: I was asked to consult on Isacc Wang Jr. by Dr Heck for evaluation of globus sensation and dysphagia. Mr. Wang is a 41 year old male who presents to us today with globus sensation.      Of note he does have history of obesity, Schatzki's ring, and GERD.     he presents today for evaluation. he reports:    Dysphonia  - denies    Dysphagia  - started 2.5 years ago around the start of Covid  - feels his symptoms exacerbated after recent EGD 6 months ago   - tightness with his swallowing, only present with specific foods  - ketchup, coffee, spaghetti sauce, caffeine beverages  - overall not very bothersome  - avoids coffee for this and anxiety  - takes omeprazole before or after he eats something that will flare   - denies feeling food sticking when swallowing   - he may have gained some weight recently   - no esophagitis    Dyspnea  - denies    Throat clearing/cough  - feels general mucus in his throat  - worse when feeling GERD symptoms  - no environmental triggers noted   - could be allergy related but has not tried allergy medications  - experiences rhinorrhea and itchy eyes  - has not seen  an allergist  - denies using inhalers  - no recent antibiotic courses    GERD/LPRD   - takes omeprazole before or after he eats something that will flare   - history of Schatzki's ring    PAST MEDICAL HISTORY:   Past Medical History:   Diagnosis Date     Esophageal reflux      Esophageal stricture 10/07/2020    benign esophageal stricture dilated at [facility]     History of 2019 novel coronavirus disease (COVID-19) 04/30/2020    covid 19 infection (POS pCR) ; no sequelae     Hyperlipidemia LDL goal <130 10/09/2009     Obesity 10/21/2009       PAST SURGICAL HISTORY:   Past Surgical History:   Procedure Laterality Date     COLONOSCOPY WITH CO2 INSUFFLATION N/A 10/26/2017    Procedure: COLONOSCOPY WITH CO2 INSUFFLATION;  COLON SCREEN/ FAMILY HX/ SPECIAL SCREENING FOR MALIGNANT NEOPLASMS/ ZOWNIROWYCZ;  Surgeon: Alfonso Ortega MD;  Location:  OR     [facility] NONSPECIFIC PROCEDURE      cyst removed right ear       CURRENT MEDICATIONS:   Current Outpatient Medications:      neomycin-polymyxin-hydrocortisone (CORTISPORIN) 3.5-75619-5 otic solution, Place 3 drops into the right ear 4 times daily for 7 days, Disp: 10 mL, Rfl: 0     omeprazole (PRILOSEC) 20 MG DR capsule, Take 1 capsule (20 mg) by mouth daily, Disp: 90 capsule, Rfl: 0    ALLERGIES: Seasonal allergies    SOCIAL HISTORY:    Social History     Socioeconomic History     Marital status: Single     Spouse name: Not on file     Number of children: 3     Years of education: Not on file     Highest education level: Not on file   Occupational History     Employer: UNEMPLOYED   Tobacco Use     Smoking status: Never Smoker     Smokeless tobacco: Never Used   Substance and Sexual Activity     Alcohol use: Not Currently     Drug use: No     Sexual activity: Yes     Partners: Female     Birth control/protection: I.U.D.   Other Topics Concern     Parent/sibling w/ CABG, MI or angioplasty before 65F 55M? Yes     Comment: father MI   Social History Narrative     Not on file      Social Determinants of Health     Financial Resource Strain: Not on file   Food Insecurity: Not on file   Transportation Needs: Not on file   Physical Activity: Not on file   Stress: Not on file   Social Connections: Not on file   Intimate Partner Violence: Not on file   Housing Stability: Not on file         FAMILY HISTORY:   Family History   Problem Relation Age of Onset     Hypertension Mother      Cancer Mother         cervical cancer     Depression Mother      Thyroid Disease Mother      Diabetes Father      Hypertension Father      Cerebrovascular Disease Father      Depression Father      Heart Disease Father      Lipids Father      Colon Cancer Father      Colon Cancer Maternal Grandmother      Colon Cancer Maternal Uncle      Non-contributory for problems with anesthesia    REVIEW OF SYSTEMS:   The patient was asked a 14 point review of systems regarding constitutional symptoms, eye symptoms, ears, nose, mouth, throat symptoms, cardiovascular symptoms, respiratory symptoms, gastrointestinal symptoms, genitourinary symptoms, musculoskeletal symptoms, integumentary symptoms, neurological symptoms, psychiatric symptoms, endocrine symptoms, hematologic/lymphatic symptoms, and allergic/ immunologic symptoms.   The pertinent factors have been included in the HPI and below.  Patient Supplied Answers to Review of Systems  No flowsheet data found.    Physical Examination   The patient underwent a physical examination as described below. The pertinent positive and negative findings are summarized after the description of the examination.  Constitutional: The patient's developmental and nutritional status was assessed. The patient's voice quality was assessed.  Head and Face: The head and face were inspected for deformities. The sinuses were palpated. The salivary glands were palpated. Facial muscle strength was assessed bilaterally.  Eyes: Extraocular movements and primary gaze alignment were assessed.  Ears,  Nose, Mouth and Throat: The ears and nose were examined for deformities. The nasal septum, mucosa, and turbinates were inspected by anterior rhinoscopy. The lips, teeth, and gums were examined for abnormalities. The oral mucosa, tongue, palate, tonsils, lateral and posterior pharynx were inspected for the presence of asymmetry or mucosal lesions.    Neck: The tracheal position was noted, and the neck mass palpated to determine if there were any asymmetries, abnormal neck masses, thyromegally, or thyroid nodules.  Respiratory: The nature of the breathing and chest expansion/symmetry was observed.  Cardiovascular: The patient was examined to determine the presence of any edema or jugular venous distension.  Abdomen: The contour of the abdomen was noted.  Lymphatic: The patient was examined for infraclavicular lymphadenopathy.  Musculoskeletal: The patient was inspected for the presence of skeletal deformities.  Extremities: The extremities were examined for any clubbing or cyanosis.  Skin: The skin was examined for inflammatory or neoplastic conditions.  Neurologic: The patient's orientation, mood, and affect were noted. The cranial nerve  functions were examined.  Other pertinent positive and negative findings on physical examination:      OC/OP: no lesions, uvula midline, soft palate elevates symmetrically, FOM/BOT soft  Neck: no lesions, no TH tenderness to palpation  All other physical examination findings were within normal limits and noncontributory.  Procedures   Flexible laryngoscopy (CPT 62278)      Pre-procedure diagnosis: dysphagia  Post-procedure diagnosis: same as above  Indication for procedure: Mr. Wang is a 41 year old male with see above  Procedure(s): Fiberoptic Laryngoscopy    Details of Procedure: After informed consent was obtained, the patient was seated in the examination chair.  The areas of the nasopharynx as well as the hypopharynx were anesthetized with topical 4% lidocaine with 0.25%  phenylephrine atomizer.  Examination of the base of tongue was performed first.  Attention was directed to any evidence of masses in the area or evidence of leukoplakia or candidal infection.  Attention was directed to the epiglottis where its size and position was determined and its movement on phonation of the vowel  e .  The piriform sinuses were then inspected for any mass lesions or pooling of secretions.  Attention was then directed to the larynx. The vocal folds were inspected for infection or any areas of leukoplakia, for masses, polypoid degeneration, or hemorrhage.  Having done this, the arytenoids and vocal processes were inspected for erythema or evidence of granuloma formation.  The posterior commissure was then inspected for evidence of inflammatory changes in the mucosa and heaping up of mucosal tissue. The patient was then instructed to say the vowel  e .  Adduction of vocal folds to the midline was observed for any evidence of paresis or paralysis of the larynx or asymmetry in rotation of the larynx to the left or right. The patient was asked to breathe and the degree of abduction was noted bilaterally.  Subglottic view of the larynx was obtained for any additional mass lesions or mucosal changes.  Finally the post cricoid was examined for evidence of pooling of secretions, as well as the pharyngeal wall mucosa.   Anesthesia type: 0.25% phenylephrine    Findings:  Anatomic/physiological deviations: RNC, arytenoid hooding, posterior cricoid with white areas that look like candida    Right vocal process: No restriction of mobility   Left vocal process: No restriction of mobility  Glottal gap: Complete glottal closure  Supraglottic structures: Normal  Hypopharynx: Normal     Estimated Blood Loss: minimal  Complications: None  Disposition: Patient tolerated the procedure well                 Fiberoptic Endoscopic Evaluation of Swallowing (CPT 16410)  and Interpretation of Swallowing (CPT  78721)    Indications: See above notes for complete history and physical. Patient complains of dysphagia to both solids and liquids and/or there is suggestion on history and endoscopic exam of the presence of dysphagia causing medical complaints.  Swallowing evaluation is being performed to assess the presence and degree of dysphagia, and to recommend a safe diet.     Pulmonary Status:  No PNA   Current Diet:              regular                                        thin liquids      Consistency Amounts:  Thin Liquid: sip   Puree: 1 tsp  Solid: cookies            Positioning: upright in a chair  Oral Peripheral Exam: See physical exam section.  Anatomic Notes: See Videostroboscopy report for assessment of anatomy and laryngeal functioning  Pharyngeal secretions prior to administration of liquid or food: Yes  Oral Phase Abnormal Findings: No abnormal behavior observed  Behavioral Adaptations: No abnormal behavior observed  Pharyngeal Phase Abnormal Findings: no penetration, no aspiration, no residue    Recommended Diet:  regular                                        thin liquids            Review of Relevant Clinical Data   I personally reviewed:  Notes: Dr. Heck 2/4/22  Throat discomfort:  Discomfort back of throat: when swallows feels like weird; like there is something.  A lot of clearing the throat.  Irritation when drinking coffee or any caffeine; feels tight.  No change in voice.  Had Covid: 2 years ago.  Radiology:   CT Neck Soft Tissue 7/16/2020  Alignment is normal. No gross vertebral body height loss.  The intervertebral disc spaces appear largely maintained. No  significant endplate osteophytes, particularly anteriorly. The facet  joints appear radiographically within normal limits. The prevertebral  soft tissues appear normal.    EGD 6/21/21 - no esophagitis - hiatal hernia, schatzki's ring with history dilation to 18mmHg  CT scan 8/18/20         Labs:  Lab Results   Component Value Date    TSH 0.98  2020     Lab Results   Component Value Date     10/05/2021    CO2 30 10/05/2021    BUN 12 10/05/2021     Lab Results   Component Value Date    WBC 8.5 2020    HGB 14.1 2020    HCT 41.3 2020    MCV 78 2020     2020     No results found for: PT, PTT, INR  No results found for: DEMARCUS  No components found for: RHEUMATOIDFACTOR,  RF  Lab Results   Component Value Date    CRP <2.9 2020    CRP <2.9 2020     No components found for: CKTOT, URICACID  No components found for: C3, C4, DSDNAAB, NDNAABIFA  No results found for: MPOAB    Patient reported Quality of Life (QOL) Measures   Patient Supplied Answers To VHI Questionnaire  No flowsheet data found.      Patient Supplied Answers To EAT Questionnaire  No flowsheet data found.      Patient Supplied Answers To CSI Questionnaire  No flowsheet data found.      Impression & Plan     IMPRESSION: Mr. Wang is a 41 year old male who is being seen for the followin. Dysphagia  - tightness with certain foods  - like tomato and coffee  - otherwise denies upper airway symptoms to be bothersome  - CT neck 20 - patent subglottis  - scope today 22 shows arytenoid hooding, posterior cricoid with white areas that look like candida   - FEES today shows no penetration, residue, or aspiration  - symptoms likely esophageal due to reflux   - discussed Xray Video Swallow Exam and esophagram  and further workup with GI, with endoscopy and pH testing   Plan  - follow with Dr. Vuong for endoscopy and pH testing  - schedule Xray Video Swallow Exam with esophagram    2. Pharyngeal candidiasis   - denies using inhalers or recent antibiotics   - scope today shows possible pharyngeal thrush, will treat empirically   Plan  - nystatin swish and swallow  - follow-up in 3 weeks    3. Nasal congestion   - reports itchy nose, eyes, and congestion  - has not seen an allergist   -  discussed using Netti-Pot and Flonase until next  appointment and if not improved at that time will consider allergist referral  Plan  - Netti-Pot followed by Flonase BID      RETURN VISIT: 3 weeks     Thank you for the kind referral and for allowing me to share in the care of Mr. Wang. If you have any questions, please do not hesitate to contact me.    Scribe Disclosure:  I, August Fernandez am serving as a scribe to document services personally performed by Dixie Walker MD at this visit, based upon the provider's statements to me. All documentation has been reviewed by the aforementioned provider prior to being entered into the official medical record.     Dixie Walker MD    Laryngology    [facility]  Department of  Otolaryngology - Head and Neck Surgery  Clinics [place] [address]  Appointment line: [phone]  Fax: [phone]  [email or web address]      EGD 6/21/21 - no esophagitis - hiatal hernia, schatzki's ring with history dilation to 18mmHg  CT scan 8/18/20      1. Dysphagia  - tightness with certain foods  - like tomato and coffee  - CT neck 8/18/20 - patent subglottis          Again, thank you for allowing me to participate in the care of your patient.      Sincerely,    Dixie Walker MD

## 2022-05-17 NOTE — PROGRESS NOTES
EGD 6/21/21 - no esophagitis - hiatal hernia, schatzki's ring with history dilation to 18mmHg  CT scan 8/18/20      1. Dysphagia  - tightness with certain foods  - like tomato and coffee  - CT neck 8/18/20 - patent subglottis

## 2022-05-17 NOTE — PROGRESS NOTES
"[facility]  Evaluation report    Clinician: Amandeep Cervantes M.M., M.A., CCC/SLP  Seen in conjunction with: Dr. Walker  Referring physician:  Dr. Oneill  Patient: Isacc Wang  Date of Visit: 5/17/2022    HISTORY  Chief complaint: Isacc Wang is a 41 year old gentleman presenting today for evaluation of cough/throat clearing, swallowing difficulties, and throat discomfort.    Chart Review: He has a history significant for GERD, Schatzki's ring, gout, and familial history of colon cancer.  When patient was seen by primary care on 2/4/2022 he complained of persistent throat discomfort and dysphagia.  He was referred to ENT for further evaluation and treatment as warranted and presents for this today.    CURRENT SYMPTOMS PER PATIENT REPORT:    COUGH/THROAT CLEARING    Onset and inciting incident: Started around COVID pandemic beginning but worsened 6 months ago following EGD    Progression: stable    Worsens with: reflux    Mucus sensation in his throat    States that this bothers him \"a little bit\" and he is mostly interested in understanding what is going on    SWALLOWING    Began around the start of COVID    Throat discomfort with swallowing    Describes it as \"tightness\"    More with solid foods    Not an issue with liquids    Feels dryness    ADDITIONAL    Significant Reflux    Improves with omeprazole    Not taking PPI on a regimen but rather using them symptomatically after symptom onset     Patient denies significant dyspnea, dysphonia and pain.     OTHER PERTINENT HISTORY    Otherwise unknown please see Dr. Walker's note for additional details    Past Medical History:   Diagnosis Date     Esophageal reflux      Esophageal stricture 10/07/2020    benign esophageal stricture dilated at [facility]     History of 2019 novel coronavirus disease (COVID-19) 04/30/2020    covid 19 infection (POS pCR) ; no sequelae     Hyperlipidemia LDL goal <130 10/09/2009     Obesity 10/21/2009     Past Surgical History: "   Procedure Laterality Date     COLONOSCOPY WITH CO2 INSUFFLATION N/A 10/26/2017    Procedure: COLONOSCOPY WITH CO2 INSUFFLATION;  COLON SCREEN/ FAMILY HX/ SPECIAL SCREENING FOR MALIGNANT NEOPLASMS/ ZOWNIROWYCZ;  Surgeon: Alfonso Ortega MD;  Location:  OR     [facility] NONSPECIFIC PROCEDURE      cyst removed right ear       OBJECTIVE  PATIENT REPORTED MEASURES    Patient Supplied Answers To EAT Questionnaire  Eating Assessment Tool (EAT-10) 5/17/2022   My swallowing problem has caused me to lose weight 1   My swallowing problem interferes with my ability to go out for meals 0   Swallowing liquids takes extra effort 0   Swallowing solids takes extra effort 0   Swallowing pills takes extra effort 2   Swallowing is painful 0   The pleasure of eating is affected by my swallowing 0   When I swallow food sticks in my throat 2   I cough when I eat 0   Swallowing is stressful 1   EAT-10 6     PERCEPTUAL EVALUATION (CPT 77189)  POSTURE / TENSION:     no overt tension visible    BREATHING:     appears within normal limits and adequate     LARYNGEAL PALPATION:     no significant tenderness    VOICE:    Roughness: Minimal    Breathiness: Minimal    Strain: Mild to moderate Intermittent    Loudness    Conversational speech:  WNL    Pitch:    Conversational speech:  WNL    Pitch glide:     Self-limited access to loft registration    Resonance:    Conversational speech:  Mild culdesac resonance    Singing vs. Speech:  Consistent across contexts    COUGH/THROAT CLEARING:    Occasional    Dry    LARYNGEAL EXAMINATION  Procedure: Flexible endoscopy with chip-tip technology without stroboscopy, right nostril; topical anesthesia with 3% Lidocaine and 0.25% phenylephrine was not applied.   Performed by: Dr. Dixie Walker  The laryngeal and pharyngeal structures were evaluated for gross appearance, mobility, function, and focal lesions / abnormalities of the associated mucosa.    All findings were within normal limits with the  exception of the following salient features:     Subtle white patchy irregularity of the posterior pharynx near the UES    Retroflexed omega shaped epiglottis    Essentially healthy vocal fold mucosa    With phonatory tasks there is epiglottic retroflexion and 4 way constriction of the supraglottis    Decreased hyperfunction with high flow / yawn sigh techniques and with clinician support patient was able to carry this over into speech    A screening swallow evaluation was completed today by my colleague Syl Mtz CCC-SLP and Dr. Walker please see their notes for additional details regarding this facet of the patient's evaluation    The laryngeal exam was reviewed with Mr. Wang, and I provided pertinent explanations, as well as written and oral information.    ASSESSMENT / PLAN  IMPRESSIONS: Isacc Wang is presenting today with throat discomfort/ tightness with swallowing and frequent throat clearing.  Today's evaluation demonstrates Chronic Throat Clearing (R68.89) and Throat Discomfort/tightness (R07.0) in the context of Gastroesophageal reflux disease and corresponding irritation/sensitivity of the laryngeal pharyngeal mucosa. Laryngeal evaluation shows essentially healthy vocal fold mucosa, but subtle patchy irregularity of the postcricoid region potentially indicative of fungal infection versus minor mucosal irritation from reflux.  Although there is a degree of supraglottic recruitment during phonatory tasks that may exacerbate existing irritation, poor correlation between symptoms and voice use, and clear correlation between patient's symptoms and sensation of reflux makes prioritization of reflux management of higher importance at this time.  It is recommended that the patient go through formal reflux evaluation with gastroenterology including symptom correlation with pH probe.  In addition to this a course of antifungal medications is recommended to address possible laryngeal candidiasis.  Should  he be significantly bothered by throat clearing even after empiric course of antifungal medications, and management of reflux is under control he was encouraged to follow-up with our clinic as upper airway focused speech therapy to address cough/throat clearing and laryngeal pharyngeal hypersensitivity may be warranted at that time.  Today's appointment is evaluation only.    TOTAL SERVICE TIME: 40 minutes  EVALUATION OF VOICE AND RESONANCE (28944)  NO CHARGE FACILITY FEE (52883)    Amandeep Cervantes M.M., M.A., CCC-SLP  Speech-Language Pathologist  Certificate of Vocology  [phone]    *this report was created in part through the use of computerized dictation software, and though reviewed following completion, some typographic errors may persist.  If there is confusion regarding any of this notes contents, please contact me for clarification.*

## 2022-05-17 NOTE — PROGRESS NOTES
[facility]   at the [facility]   Otolaryngology Clinic     Patient: Isacc Wang Jr.    MRN: [id number]    : 1980    Age/Gender: 41 year old male  Date of Service: 2022  Rendering Provider:   Dixie Walker MD     Referring Provider   PCP: [facility]  Referring Physician: Alfonso Heck MD  [address] [place] [address]  Reason for Consultation   Globus sensation, dysphagia  History   HISTORY OF PRESENT ILLNESS: I was asked to consult on Isacc Wang Jr. by Dr Heck for evaluation of globus sensation and dysphagia. Mr. Wang is a 41 year old male who presents to us today with globus sensation.      Of note he does have history of obesity, Schatzki's ring, and GERD.     he presents today for evaluation. he reports:    Dysphonia  - denies    Dysphagia  - started 2.5 years ago around the start of Covid  - feels his symptoms exacerbated after recent EGD 6 months ago   - tightness with his swallowing, only present with specific foods  - ketchup, coffee, spaghetti sauce, caffeine beverages  - overall not very bothersome  - avoids coffee for this and anxiety  - takes omeprazole before or after he eats something that will flare   - denies feeling food sticking when swallowing   - he may have gained some weight recently   - no esophagitis    Dyspnea  - denies    Throat clearing/cough  - feels general mucus in his throat  - worse when feeling GERD symptoms  - no environmental triggers noted   - could be allergy related but has not tried allergy medications  - experiences rhinorrhea and itchy eyes  - has not seen an allergist  - denies using inhalers  - no recent antibiotic courses    GERD/LPRD   - takes omeprazole before or after he eats something that will flare   - history of Schatzki's ring    PAST MEDICAL HISTORY:   Past Medical History:   Diagnosis Date     Esophageal reflux      Esophageal stricture 10/07/2020    benign esophageal  stricture dilated at [facility]     History of 2019 novel coronavirus disease (COVID-19) 04/30/2020    covid 19 infection (POS pCR) ; no sequelae     Hyperlipidemia LDL goal <130 10/09/2009     Obesity 10/21/2009       PAST SURGICAL HISTORY:   Past Surgical History:   Procedure Laterality Date     COLONOSCOPY WITH CO2 INSUFFLATION N/A 10/26/2017    Procedure: COLONOSCOPY WITH CO2 INSUFFLATION;  COLON SCREEN/ FAMILY HX/ SPECIAL SCREENING FOR MALIGNANT NEOPLASMS/ ZOWNIROWYCZ;  Surgeon: Alfonso Ortega MD;  Location: [facility] OR     ZZC NONSPECIFIC PROCEDURE      cyst removed right ear       CURRENT MEDICATIONS:   Current Outpatient Medications:      neomycin-polymyxin-hydrocortisone (CORTISPORIN) 3.5-97636-8 otic solution, Place 3 drops into the right ear 4 times daily for 7 days, Disp: 10 mL, Rfl: 0     omeprazole (PRILOSEC) 20 MG DR capsule, Take 1 capsule (20 mg) by mouth daily, Disp: 90 capsule, Rfl: 0    ALLERGIES: Seasonal allergies    SOCIAL HISTORY:    Social History     Socioeconomic History     Marital status: Single     Spouse name: Not on file     Number of children: 3     Years of education: Not on file     Highest education level: Not on file   Occupational History     Employer: UNEMPLOYED   Tobacco Use     Smoking status: Never Smoker     Smokeless tobacco: Never Used   Substance and Sexual Activity     Alcohol use: Not Currently     Drug use: No     Sexual activity: Yes     Partners: Female     Birth control/protection: I.U.D.   Other Topics Concern     Parent/sibling w/ CABG, MI or angioplasty before 65F 55M? Yes     Comment: father MI   Social History Narrative     Not on file     Social Determinants of Health     Financial Resource Strain: Not on file   Food Insecurity: Not on file   Transportation Needs: Not on file   Physical Activity: Not on file   Stress: Not on file   Social Connections: Not on file   Intimate Partner Violence: Not on file   Housing Stability: Not on file         FAMILY HISTORY:    Family History   Problem Relation Age of Onset     Hypertension Mother      Cancer Mother         cervical cancer     Depression Mother      Thyroid Disease Mother      Diabetes Father      Hypertension Father      Cerebrovascular Disease Father      Depression Father      Heart Disease Father      Lipids Father      Colon Cancer Father      Colon Cancer Maternal Grandmother      Colon Cancer Maternal Uncle      Non-contributory for problems with anesthesia    REVIEW OF SYSTEMS:   The patient was asked a 14 point review of systems regarding constitutional symptoms, eye symptoms, ears, nose, mouth, throat symptoms, cardiovascular symptoms, respiratory symptoms, gastrointestinal symptoms, genitourinary symptoms, musculoskeletal symptoms, integumentary symptoms, neurological symptoms, psychiatric symptoms, endocrine symptoms, hematologic/lymphatic symptoms, and allergic/ immunologic symptoms.   The pertinent factors have been included in the HPI and below.  Patient Supplied Answers to Review of Systems  No flowsheet data found.    Physical Examination   The patient underwent a physical examination as described below. The pertinent positive and negative findings are summarized after the description of the examination.  Constitutional: The patient's developmental and nutritional status was assessed. The patient's voice quality was assessed.  Head and Face: The head and face were inspected for deformities. The sinuses were palpated. The salivary glands were palpated. Facial muscle strength was assessed bilaterally.  Eyes: Extraocular movements and primary gaze alignment were assessed.  Ears, Nose, Mouth and Throat: The ears and nose were examined for deformities. The nasal septum, mucosa, and turbinates were inspected by anterior rhinoscopy. The lips, teeth, and gums were examined for abnormalities. The oral mucosa, tongue, palate, tonsils, lateral and posterior pharynx were inspected for the presence of asymmetry or  mucosal lesions.    Neck: The tracheal position was noted, and the neck mass palpated to determine if there were any asymmetries, abnormal neck masses, thyromegally, or thyroid nodules.  Respiratory: The nature of the breathing and chest expansion/symmetry was observed.  Cardiovascular: The patient was examined to determine the presence of any edema or jugular venous distension.  Abdomen: The contour of the abdomen was noted.  Lymphatic: The patient was examined for infraclavicular lymphadenopathy.  Musculoskeletal: The patient was inspected for the presence of skeletal deformities.  Extremities: The extremities were examined for any clubbing or cyanosis.  Skin: The skin was examined for inflammatory or neoplastic conditions.  Neurologic: The patient's orientation, mood, and affect were noted. The cranial nerve  functions were examined.  Other pertinent positive and negative findings on physical examination:      OC/OP: no lesions, uvula midline, soft palate elevates symmetrically, FOM/BOT soft  Neck: no lesions, no TH tenderness to palpation  All other physical examination findings were within normal limits and noncontributory.  Procedures   Flexible laryngoscopy (CPT 79568)      Pre-procedure diagnosis: dysphagia  Post-procedure diagnosis: same as above  Indication for procedure: Mr. Wang is a 41 year old male with see above  Procedure(s): Fiberoptic Laryngoscopy    Details of Procedure: After informed consent was obtained, the patient was seated in the examination chair.  The areas of the nasopharynx as well as the hypopharynx were anesthetized with topical 4% lidocaine with 0.25% phenylephrine atomizer.  Examination of the base of tongue was performed first.  Attention was directed to any evidence of masses in the area or evidence of leukoplakia or candidal infection.  Attention was directed to the epiglottis where its size and position was determined and its movement on phonation of the vowel  e .  The  piriform sinuses were then inspected for any mass lesions or pooling of secretions.  Attention was then directed to the larynx. The vocal folds were inspected for infection or any areas of leukoplakia, for masses, polypoid degeneration, or hemorrhage.  Having done this, the arytenoids and vocal processes were inspected for erythema or evidence of granuloma formation.  The posterior commissure was then inspected for evidence of inflammatory changes in the mucosa and heaping up of mucosal tissue. The patient was then instructed to say the vowel  e .  Adduction of vocal folds to the midline was observed for any evidence of paresis or paralysis of the larynx or asymmetry in rotation of the larynx to the left or right. The patient was asked to breathe and the degree of abduction was noted bilaterally.  Subglottic view of the larynx was obtained for any additional mass lesions or mucosal changes.  Finally the post cricoid was examined for evidence of pooling of secretions, as well as the pharyngeal wall mucosa.   Anesthesia type: 0.25% phenylephrine    Findings:  Anatomic/physiological deviations: RNC, arytenoid hooding, posterior cricoid with white areas that look like candida    Right vocal process: No restriction of mobility   Left vocal process: No restriction of mobility  Glottal gap: Complete glottal closure  Supraglottic structures: Normal  Hypopharynx: Normal     Estimated Blood Loss: minimal  Complications: None  Disposition: Patient tolerated the procedure well                 Fiberoptic Endoscopic Evaluation of Swallowing (CPT 14242)  and Interpretation of Swallowing (CPT 63638)    Indications: See above notes for complete history and physical. Patient complains of dysphagia to both solids and liquids and/or there is suggestion on history and endoscopic exam of the presence of dysphagia causing medical complaints.  Swallowing evaluation is being performed to assess the presence and degree of dysphagia, and to  recommend a safe diet.     Pulmonary Status:  No PNA   Current Diet:              regular                                        thin liquids      Consistency Amounts:  Thin Liquid: sip   Puree: 1 tsp  Solid: cookies            Positioning: upright in a chair  Oral Peripheral Exam: See physical exam section.  Anatomic Notes: See Videostroboscopy report for assessment of anatomy and laryngeal functioning  Pharyngeal secretions prior to administration of liquid or food: Yes  Oral Phase Abnormal Findings: No abnormal behavior observed  Behavioral Adaptations: No abnormal behavior observed  Pharyngeal Phase Abnormal Findings: no penetration, no aspiration, no residue    Recommended Diet:  regular                                        thin liquids            Review of Relevant Clinical Data   I personally reviewed:  Notes: Dr. Heck 2/4/22  Throat discomfort:  Discomfort back of throat: when swallows feels like weird; like there is something.  A lot of clearing the throat.  Irritation when drinking coffee or any caffeine; feels tight.  No change in voice.  Had Covid: 2 years ago.  Radiology:   CT Neck Soft Tissue 7/16/2020  Alignment is normal. No gross vertebral body height loss.  The intervertebral disc spaces appear largely maintained. No  significant endplate osteophytes, particularly anteriorly. The facet  joints appear radiographically within normal limits. The prevertebral  soft tissues appear normal.    EGD 6/21/21 - no esophagitis - hiatal hernia, schatzki's ring with history dilation to 18mmHg  CT scan 8/18/20         Labs:  Lab Results   Component Value Date    TSH 0.98 11/20/2020     Lab Results   Component Value Date     10/05/2021    CO2 30 10/05/2021    BUN 12 10/05/2021     Lab Results   Component Value Date    WBC 8.5 11/20/2020    HGB 14.1 11/20/2020    HCT 41.3 11/20/2020    MCV 78 11/20/2020     11/20/2020     No results found for: PT, PTT, INR  No results found for: DEMARCUS  No components  found for: RHEUMATOIDFACTOR,  RF  Lab Results   Component Value Date    CRP <2.9 2020    CRP <2.9 2020     No components found for: CKTOT, URICACID  No components found for: C3, C4, DSDNAAB, NDNAABIFA  No results found for: MPOAB    Patient reported Quality of Life (QOL) Measures   Patient Supplied Answers To VHI Questionnaire  No flowsheet data found.      Patient Supplied Answers To EAT Questionnaire  No flowsheet data found.      Patient Supplied Answers To CSI Questionnaire  No flowsheet data found.      Impression & Plan     IMPRESSION: Mr. Wang is a 41 year old male who is being seen for the followin. Dysphagia  - tightness with certain foods  - like tomato and coffee  - otherwise denies upper airway symptoms to be bothersome  - CT neck 20 - patent subglottis  - scope today 22 shows arytenoid hooding, posterior cricoid with white areas that look like candida   - FEES today shows no penetration, residue, or aspiration  - symptoms likely esophageal due to reflux   - discussed Xray Video Swallow Exam and esophagram  and further workup with GI, with endoscopy and pH testing   Plan  - follow with Dr. Vuong for endoscopy and pH testing  - schedule Xray Video Swallow Exam with esophagram    2. Pharyngeal candidiasis   - denies using inhalers or recent antibiotics   - scope today shows possible pharyngeal thrush, will treat empirically   Plan  - nystatin swish and swallow  - follow-up in 3 weeks    3. Nasal congestion   - reports itchy nose, eyes, and congestion  - has not seen an allergist   -  discussed using Netti-Pot and Flonase until next appointment and if not improved at that time will consider allergist referral  Plan  - Netti-Pot followed by Flonase BID      RETURN VISIT: 3 weeks     Thank you for the kind referral and for allowing me to share in the care of Mr. Wang. If you have any questions, please do not hesitate to contact me.    Scribe Disclosure:  Edmundo WHITTEN  am serving as a scribe to document services personally performed by Dixie Walker MD at this visit, based upon the provider's statements to me. All documentation has been reviewed by the aforementioned provider prior to being entered into the official medical record.     Dixie Walker MD    Laryngology    [facility]  Department of  Otolaryngology - Head and Neck Surgery  Clinics & Surgery Center  [address] [place] [address]  Appointment line: [phone]  Fax: [phone]  [email or web address]

## 2022-05-18 NOTE — PROGRESS NOTES
"     Speech-Language Pathology Department   EVALUATION  [facility]  Clinical Swallow Evaluation with Endoscopic Guidance by MD    05/17/22 1400   General Information   Type Of Visit Initial   Start Of Care Date 05/17/22   Referring Physician Dr. Dixie Walker   Orders Evaluate And Treat   Orders Comment Clinical Swallow Study   Medical Diagnosis Dysphagia, unspecified; hx esophageal stricture   Precautions/limitations No Known Precautions/limitations   Hearing Functional in 1:1 setting   Pertinent History of Current Problem/OT: Additional Occupational Profile Info Isacc Wang Jr is a 41 year old male with a PMH significant for hx of distal esophageal ring, GERD, throat clearing and throat tightness when swallowing. Pt seen today in conjunction with ENT clinic visit per MD request. Today pt reports the throat tightness only happens after he eats certain foods such as coffee, spaghetti sauce, and ketchup. He does feel a heartburn sensation, as well during this time. Denies feeling of stasis and coughing/TC with PO intake. Denies recent PNAs and unintentional weightloss. Reports he takes Omeprazole intermittently when he thinks he might eat something that will flare his reflux. Per chart review, pt does not have an established GI provider.   Respiratory Status Room air   Prior Level Of Function Swallowing   Prior Level Of Function Comment Regular textures/thin liquids   General Observations Pt pleasant and cooperative throughout evaluation   Patient/family Goals To figure out what is causing the \"tightness\" feeling in his throat with swallowing   Clinical Swallow Evaluation   Oral Musculature generally intact   Structural Abnormalities none present   Dentition present and adequate   Mucosal Quality adequate   Mandibular Strength and Mobility intact   Oral Labial Strength and Mobility WFL   Lingual Strength and Mobility WFL   Velar Elevation intact   Buccal Strength and " Mobility intact   Laryngeal Function Cough;Swallow;Voicing initiated   Oral Musculature Comments Generally WFL   Additional Documentation Yes   Clinical Swallow Eval: Thin Liquid Texture Trial   Mode of Presentation, Thin Liquids straw   Volume of Liquid or Food Presented 4oz tea   Oral Phase of Swallow WFL   Pharyngeal Phase of Swallow intact   Diagnostic Statement PO trials evaluated under endoscopy completed by MD. No penetration/aspiration or significant residuals. Pt denied feeling of throat tightness.   Clinical Swallow Eval: Puree Solid Texture Trial   Mode of Presentation, Puree spoon;fed by clinician   Volume of Puree Presented 3 tablespoons   Oral Phase, Puree WFL   Pharyngeal Phase, Puree intact   Diagnostic Statement PO trials evaluated under endoscopy completed by MD. No penetration/aspiration or significant residuals. Pt denied feeling of throat tightness.   Clinical Swallow Eval: Regular (Solid)   Mode of Presentation self-fed   Volume Presented 1 Trish Doone   Oral Phase WFL   Pharyngeal Phase intact   Diagnostic Statement PO trials evaluated under endoscopy completed by MD. No penetration/aspiration or significant residuals. Pt denied feeling of throat tightness.   Swallow Compensations   Swallow Compensations No compensations were used   Educational Assessment   Barriers to Learning No barriers   Esophageal Phase of Swallow   Patient reports or presents with symptoms of esophageal dysphagia Yes  (recommend GI consult)   Swallow Eval: Clinical Impressions   Skilled Criteria for Therapy Intervention No problems identified which require skilled intervention   Dysphagia Outcome Severity Scale (EMILY) Level 7 - EMILY   Treatment Diagnosis Safe, functional oropharyngeal swallow response   Diet texture recommendations Regular diet;Thin liquids (level 0)   Demonstrates Need for Referral to Another Service other (see comments)  (GI)   Anticipated Discharge Disposition home   Risks and Benefits of Treatment  have been explained. Yes   Patient, family and/or staff in agreement with Plan of Care Yes   Clinical Impression Comments Pt presents today with a safe, functional oropharyngeal swallow response. Oral motor examination is unremarkable. Pt assessed today under endoscopy completed by MD with thin liquid, puree and solid PO trials. No penetration/aspiration or significant residuals with any PO trial texture. Pt denied feeling of throat tightness. At this time, there does not appear to be an element of oropharyngeal dysphagia. Based on patient's symptoms, suspect tightness is related to GERD and GI issues. Recommend GI consult. Trained pt on findings of evaluation and recommendations. No further SLP services for swallowing are warranted at this time.   Total Session Time   SLP Eval: oral/pharyngeal swallow function, clinical minutes (66188) 30   Total Evaluation Time 30     Thank you for the referral of Isacc Wang Jr.. If you have any questions about this report, please contact me using the information below.     TRENT Vang MA (music), CCC-SLP   Speech Language Pathologist  [place] Trained Vocologist   [facility] [place]  Dept. of Otolaryngology  Department of Rehabilitation Services  [address] [place] [id number]  Email: [email or web address]   Pronouns: she/her/hers

## 2022-05-19 ENCOUNTER — PATIENT OUTREACH (OUTPATIENT)
Dept: GASTROENTEROLOGY | Facility: CLINIC | Age: 42
End: 2022-05-19
Payer: COMMERCIAL

## 2022-05-19 DIAGNOSIS — K21.00 GASTROESOPHAGEAL REFLUX DISEASE WITH ESOPHAGITIS: Primary | ICD-10-CM

## 2022-05-19 DIAGNOSIS — K22.2 ESOPHAGEAL STRICTURE: ICD-10-CM

## 2022-05-19 DIAGNOSIS — R13.10 DYSPHAGIA: ICD-10-CM

## 2022-05-19 NOTE — TELEPHONE ENCOUNTER
Dr Walker (ENT) referral to Dr Vuong (esophageal clinic) for:  reflux.   prior schatzki ring    Per provider, pt communicated with regarding plan for: EGD with [facility] off therapy and set up video visit with Dr Vuong to establish care.

## 2022-05-21 DIAGNOSIS — K22.2 SCHATZKI'S RING: ICD-10-CM

## 2022-05-21 DIAGNOSIS — K21.00 GASTROESOPHAGEAL REFLUX DISEASE WITH ESOPHAGITIS WITHOUT HEMORRHAGE: ICD-10-CM

## 2022-05-23 NOTE — TELEPHONE ENCOUNTER
Prescription approved per [facility] [place] Protocol.    Saloni Jorgensen RN

## 2022-06-09 NOTE — PROGRESS NOTES
[facility]   at the [facility]   Otolaryngology Clinic     Patient: Isacc Wang Jr.    MRN: [id number]    : 1980    Age/Gender: 41 year old male  Date of Service: 6/10/2022  Rendering Provider:   Dixie Walker MD     Chief Complaint   Globus sensation  Dysphagia  Interval History   HISTORY OF PRESENT ILLNESS: Mr. Wang is a 41 year old male is being followed for globus sensation. he was initially seen on 22. Please refer to this note for full history.     Of note he does have history of obesity, Schatzki's ring, and GERD.     Today, he presents for follow up. he reports:  - feeling better     PAST MEDICAL HISTORY:   Past Medical History:   Diagnosis Date     Esophageal reflux      Esophageal stricture 10/07/2020    benign esophageal stricture dilated at [facility]     History of 2019 novel coronavirus disease (COVID-19) 2020    covid 19 infection (POS pCR) ; no sequelae     Hyperlipidemia LDL goal <130 10/09/2009     Obesity 10/21/2009       PAST SURGICAL HISTORY:   Past Surgical History:   Procedure Laterality Date     COLONOSCOPY WITH CO2 INSUFFLATION N/A 10/26/2017    Procedure: COLONOSCOPY WITH CO2 INSUFFLATION;  COLON SCREEN/ FAMILY HX/ SPECIAL SCREENING FOR MALIGNANT NEOPLASMS/ ZOWNIROWYCZ;  Surgeon: Alfonso Ortega MD;  Location:  OR     [facility] NONSPECIFIC PROCEDURE      cyst removed right ear       CURRENT MEDICATIONS:   Current Outpatient Medications:      fluticasone (FLONASE) 50 MCG/ACT nasal spray, Spray 2 sprays into both nostrils 2 times daily, Disp: 11.1 mL, Rfl: 3     omeprazole (PRILOSEC) 20 MG DR capsule, TAKE 1 CAPSULE(20 MG) BY MOUTH DAILY, Disp: 90 capsule, Rfl: 2    ALLERGIES: Seasonal allergies    SOCIAL HISTORY:    Social History     Socioeconomic History     Marital status: Single     Spouse name: Not on file     Number of children: 3     Years of education: Not on file     Highest education level: Not on file   Occupational History      Employer: UNEMPLOYED   Tobacco Use     Smoking status: Never Smoker     Smokeless tobacco: Never Used   Substance and Sexual Activity     Alcohol use: Not Currently     Drug use: No     Sexual activity: Yes     Partners: Female     Birth control/protection: I.U.D.   Other Topics Concern     Parent/sibling w/ CABG, MI or angioplasty before 65F 55M? Yes     Comment: father MI   Social History Narrative     Not on file     Social Determinants of Health     Financial Resource Strain: Not on file   Food Insecurity: Not on file   Transportation Needs: Not on file   Physical Activity: Not on file   Stress: Not on file   Social Connections: Not on file   Intimate Partner Violence: Not on file   Housing Stability: Not on file         FAMILY HISTORY:   Family History   Problem Relation Age of Onset     Hypertension Mother      Cancer Mother         cervical cancer     Depression Mother      Thyroid Disease Mother      Diabetes Father      Hypertension Father      Cerebrovascular Disease Father      Depression Father      Heart Disease Father      Lipids Father      Colon Cancer Father      Colon Cancer Maternal Grandmother      Colon Cancer Maternal Uncle       Non-contributory for problems with anesthesia    REVIEW OF SYSTEMS:   The patient was asked a 14 point review of systems regarding constitutional symptoms, eye symptoms, ears, nose, mouth, throat symptoms, cardiovascular symptoms, respiratory symptoms, gastrointestinal symptoms, genitourinary symptoms, musculoskeletal symptoms, integumentary symptoms, neurological symptoms, psychiatric symptoms, endocrine symptoms, hematologic/lymphatic symptoms, and allergic/ immunologic symptoms.   The pertinent factors have been included in the HPI and below.  Patient Supplied Answers to Review of Systems  No flowsheet data found.    Physical Examination   The patient underwent a physical examination as described below. The pertinent positive and negative findings are summarized  after the description of the examination.  Constitutional: The patient's developmental and nutritional status was assessed. The patient's voice quality was assessed.  Head and Face: The head and face were inspected for deformities. The sinuses were palpated. The salivary glands were palpated. Facial muscle strength was assessed bilaterally.  Eyes: Extraocular movements and primary gaze alignment were assessed.  Ears, Nose, Mouth and Throat: The ears and nose were examined for deformities. The nasal septum, mucosa, and turbinates were inspected by anterior rhinoscopy. The lips, teeth, and gums were examined for abnormalities. The oral mucosa, tongue, palate, tonsils, lateral and posterior pharynx were inspected for the presence of asymmetry or mucosal lesions.    Neck: The tracheal position was noted, and the neck mass palpated to determine if there were any asymmetries, abnormal neck masses, thyromegally, or thyroid nodules.  Respiratory: The nature of the breathing and chest expansion/symmetry was observed.  Cardiovascular: The patient was examined to determine the presence of any edema or jugular venous distension.  Abdomen: The contour of the abdomen was noted.  Lymphatic: The patient was examined for infraclavicular lymphadenopathy.  Musculoskeletal: The patient was inspected for the presence of skeletal deformities.  Extremities: The extremities were examined for any clubbing or cyanosis.  Skin: The skin was examined for inflammatory or neoplastic conditions.  Neurologic: The patient's orientation, mood, and affect were noted. The cranial nerve  functions were examined.  Other pertinent positive and negative findings on physical examination:   OC/OP: no lesions, uvula midline, soft palate elevates symmetrically   Neck: no lesions, no TH tenderness to palpation     All other physical examination findings were within normal limits and noncontributory.    Procedures   Flexible laryngoscopy (CPT  39056)      Pre-procedure diagnosis: globus sensation  Post-procedure diagnosis: same as above  Indication for procedure: Mr. Wang is a 41 year old male with see above  Procedure(s): Fiberoptic Laryngoscopy    Details of Procedure: After informed consent was obtained, the patient was seated in the examination chair.  The areas of the nasopharynx as well as the hypopharynx were anesthetized with topical 4% lidocaine with 0.25% phenylephrine atomizer.  Examination of the base of tongue was performed first.  Attention was directed to any evidence of masses in the area or evidence of leukoplakia or candidal infection.  Attention was directed to the epiglottis where its size and position was determined and its movement on phonation of the vowel  e .  The piriform sinuses were then inspected for any mass lesions or pooling of secretions.  Attention was then directed to the larynx. The vocal folds were inspected for infection or any areas of leukoplakia, for masses, polypoid degeneration, or hemorrhage.  Having done this, the arytenoids and vocal processes were inspected for erythema or evidence of granuloma formation.  The posterior commissure was then inspected for evidence of inflammatory changes in the mucosa and heaping up of mucosal tissue. The patient was then instructed to say the vowel  e .  Adduction of vocal folds to the midline was observed for any evidence of paresis or paralysis of the larynx or asymmetry in rotation of the larynx to the left or right. The patient was asked to breathe and the degree of abduction was noted bilaterally.  Subglottic view of the larynx was obtained for any additional mass lesions or mucosal changes.  Finally the post cricoid was examined for evidence of pooling of secretions, as well as the pharyngeal wall mucosa.   Anesthesia type: 0.25% phenylephrine    Findings:  Anatomic/physiological deviations: RNC, arytenoid hooding, posterior cricoid with white areas that look like  candida - these are stable   Right vocal process: No restriction of mobility   Left vocal process: No restriction of mobility  Glottal gap: Complete glottal closure  Supraglottic structures: Normal  Hypopharynx: Normal     Estimated Blood Loss: minimal  Complications: None  Disposition: Patient tolerated the procedure well                Review of Relevant Clinical Data   I personally reviewed:     Labs:  Lab Results   Component Value Date    TSH 0.98 11/20/2020     Lab Results   Component Value Date     10/05/2021    CO2 30 10/05/2021    BUN 12 10/05/2021     Lab Results   Component Value Date    WBC 8.5 11/20/2020    HGB 14.1 11/20/2020    HCT 41.3 11/20/2020    MCV 78 11/20/2020     11/20/2020     No results found for: PT, PTT, INR  No results found for: DEMARCUS  No components found for: RHEUMATOIDFACTOR,  RF  Lab Results   Component Value Date    CRP <2.9 08/27/2020    CRP <2.9 08/14/2020     No components found for: CKTOT, URICACID  No components found for: C3, C4, DSDNAAB, NDNAABIFA  No results found for: MPOAB    Patient reported Quality of Life (QOL) Measures   Patient Supplied Answers To VHI Questionnaire  No flowsheet data found.      Patient Supplied Answers To EAT Questionnaire  Eating Assessment Tool (EAT-10) 5/17/2022   My swallowing problem has caused me to lose weight 1   My swallowing problem interferes with my ability to go out for meals 0   Swallowing liquids takes extra effort 0   Swallowing solids takes extra effort 0   Swallowing pills takes extra effort 2   Swallowing is painful 0   The pleasure of eating is affected by my swallowing 0   When I swallow food sticks in my throat 2   I cough when I eat 0   Swallowing is stressful 1   EAT-10 6         Patient Supplied Answers To CSI Questionnaire  No flowsheet data found.      Patient Supplied Answers to Dyspnea Index Questionnaire:  No flowsheet data found.    Impression & Plan     IMPRESSION: Mr. Wang is a 41 year old male who is  being seen for the followin. Dysphagia  - tightness with certain foods  - like tomato and coffee  - otherwise denies upper airway symptoms to be bothersome  - CT neck 20 - patent subglottis  - scope today 22 shows arytenoid hooding, posterior cricoid with white areas that look like candida   - FEES today shows no penetration, residue, or aspiration  - symptoms likely esophageal due to reflux   - discussed Xray Video Swallow Exam and esophagram  and further workup with GI, with endoscopy and pH testing - was told December for follow up will reach out again  Plan  - follow with Dr. Vuong for endoscopy and pH testing - reached out to Dr Vuong  - do Xray Video Swallow Exam with esophagram       2. Pharyngeal candidiasis   - denies using inhalers or recent antibiotics   - scope today shows possible pharyngeal thrush, will treat empirically   - took nystatin  - scope today shows stable appearance  - symptomatically he feels better therefore will proceed with observation and he will have an EGD as well to rule out esophageal candiadiasis  Plan  - 3 month follow up      3. Nasal congestion   - reports itchy nose, eyes, and congestion  - has not seen an allergist   -  discussed using Netti-Pot and Flonase until next appointment and if not improved at that time will consider allergist referral  Plan  - Netti-Pot followed by Flonase BID     RETURN VISIT: 3 months    Dixie Walker MD    Laryngology    [facility]  Department of  Otolaryngology - Head and Neck Surgery  Clinics & Surgery Center  [address] [place] [id number]  Appointment line: [phone]  Fax: [phone]  [email or web address]

## 2022-06-10 ENCOUNTER — OFFICE VISIT (OUTPATIENT)
Dept: OTOLARYNGOLOGY | Facility: CLINIC | Age: 42
End: 2022-06-10
Payer: COMMERCIAL

## 2022-06-10 VITALS
SYSTOLIC BLOOD PRESSURE: 132 MMHG | BODY MASS INDEX: 37.35 KG/M2 | OXYGEN SATURATION: 96 % | HEART RATE: 73 BPM | DIASTOLIC BLOOD PRESSURE: 102 MMHG | HEIGHT: 67 IN | WEIGHT: 238 LBS

## 2022-06-10 DIAGNOSIS — R07.0 THROAT DISCOMFORT: Primary | ICD-10-CM

## 2022-06-10 DIAGNOSIS — R09.A2 GLOBUS SENSATION: ICD-10-CM

## 2022-06-10 PROCEDURE — 99207 PR NO CHARGE LOS: CPT | Performed by: OTOLARYNGOLOGY

## 2022-06-10 PROCEDURE — 31575 DIAGNOSTIC LARYNGOSCOPY: CPT | Performed by: OTOLARYNGOLOGY

## 2022-06-10 ASSESSMENT — PAIN SCALES - GENERAL: PAINLEVEL: NO PAIN (0)

## 2022-06-10 NOTE — NURSING NOTE
"Chief Complaint   Patient presents with     RECHECK     Follow up       Blood pressure (!) 132/102, pulse 73, height 1.689 m (5' 6.5\"), weight 108 kg (238 lb), SpO2 96 %.    Eufemia Penn, EMT    "

## 2022-06-10 NOTE — PATIENT INSTRUCTIONS
1.  You were seen in the ENT Clinic today by . If you have any questions or concerns after your appointment, please call [phone]. Press option #1 for scheduling related needs. Press option #3 for Nurse advice.    2.   Plan is to return to clinic in 3 months      Kala Arteaga LPN  [phone]  [facility]

## 2022-06-10 NOTE — LETTER
6/10/2022       RE: Isacc Wang Jr.  [address]  [facility] [address]     Dear Colleague,    Thank you for referring your patient, Isacc Wang Jr., to the [facility] EAR NOSE AND THROAT CLINIC [place] at [facility]. Please see a copy of my visit note below.        [facility]   at the [facility]   Otolaryngology Clinic     Patient: Isacc Wang Jr.    MRN: [id number]    : 1980    Age/Gender: 41 year old male  Date of Service: 6/10/2022  Rendering Provider:   Dixie Walker MD     Chief Complaint   Globus sensation  Dysphagia  Interval History   HISTORY OF PRESENT ILLNESS: Mr. Wang is a 41 year old male is being followed for globus sensation. he was initially seen on 22. Please refer to this note for full history.     Of note he does have history of obesity, Schatzki's ring, and GERD.     Today, he presents for follow up. he reports:  - feeling better     PAST MEDICAL HISTORY:   Past Medical History:   Diagnosis Date     Esophageal reflux      Esophageal stricture 10/07/2020    benign esophageal stricture dilated at [facility]     History of 2019 novel coronavirus disease (COVID-19) 2020    covid 19 infection (POS pCR) ; no sequelae     Hyperlipidemia LDL goal <130 10/09/2009     Obesity 10/21/2009       PAST SURGICAL HISTORY:   Past Surgical History:   Procedure Laterality Date     COLONOSCOPY WITH CO2 INSUFFLATION N/A 10/26/2017    Procedure: COLONOSCOPY WITH CO2 INSUFFLATION;  COLON SCREEN/ FAMILY HX/ SPECIAL SCREENING FOR MALIGNANT NEOPLASMS/ ZOWNIROWYCZ;  Surgeon: Alfonso Ortega MD;  Location:  OR     [facility] NONSPECIFIC PROCEDURE      cyst removed right ear       CURRENT MEDICATIONS:   Current Outpatient Medications:      fluticasone (FLONASE) 50 MCG/ACT nasal spray, Spray 2 sprays into both nostrils 2 times daily, Disp: 11.1 mL, Rfl: 3     omeprazole (PRILOSEC) 20 MG DR capsule, TAKE 1  CAPSULE(20 MG) BY MOUTH DAILY, Disp: 90 capsule, Rfl: 2    ALLERGIES: Seasonal allergies    SOCIAL HISTORY:    Social History     Socioeconomic History     Marital status: Single     Spouse name: Not on file     Number of children: 3     Years of education: Not on file     Highest education level: Not on file   Occupational History     Employer: UNEMPLOYED   Tobacco Use     Smoking status: Never Smoker     Smokeless tobacco: Never Used   Substance and Sexual Activity     Alcohol use: Not Currently     Drug use: No     Sexual activity: Yes     Partners: Female     Birth control/protection: I.U.D.   Other Topics Concern     Parent/sibling w/ CABG, MI or angioplasty before 65F 55M? Yes     Comment: father MI   Social History Narrative     Not on file     Social Determinants of Health     Financial Resource Strain: Not on file   Food Insecurity: Not on file   Transportation Needs: Not on file   Physical Activity: Not on file   Stress: Not on file   Social Connections: Not on file   Intimate Partner Violence: Not on file   Housing Stability: Not on file         FAMILY HISTORY:   Family History   Problem Relation Age of Onset     Hypertension Mother      Cancer Mother         cervical cancer     Depression Mother      Thyroid Disease Mother      Diabetes Father      Hypertension Father      Cerebrovascular Disease Father      Depression Father      Heart Disease Father      Lipids Father      Colon Cancer Father      Colon Cancer Maternal Grandmother      Colon Cancer Maternal Uncle       Non-contributory for problems with anesthesia    REVIEW OF SYSTEMS:   The patient was asked a 14 point review of systems regarding constitutional symptoms, eye symptoms, ears, nose, mouth, throat symptoms, cardiovascular symptoms, respiratory symptoms, gastrointestinal symptoms, genitourinary symptoms, musculoskeletal symptoms, integumentary symptoms, neurological symptoms, psychiatric symptoms, endocrine symptoms, hematologic/lymphatic  symptoms, and allergic/ immunologic symptoms.   The pertinent factors have been included in the HPI and below.  Patient Supplied Answers to Review of Systems  No flowsheet data found.    Physical Examination   The patient underwent a physical examination as described below. The pertinent positive and negative findings are summarized after the description of the examination.  Constitutional: The patient's developmental and nutritional status was assessed. The patient's voice quality was assessed.  Head and Face: The head and face were inspected for deformities. The sinuses were palpated. The salivary glands were palpated. Facial muscle strength was assessed bilaterally.  Eyes: Extraocular movements and primary gaze alignment were assessed.  Ears, Nose, Mouth and Throat: The ears and nose were examined for deformities. The nasal septum, mucosa, and turbinates were inspected by anterior rhinoscopy. The lips, teeth, and gums were examined for abnormalities. The oral mucosa, tongue, palate, tonsils, lateral and posterior pharynx were inspected for the presence of asymmetry or mucosal lesions.    Neck: The tracheal position was noted, and the neck mass palpated to determine if there were any asymmetries, abnormal neck masses, thyromegally, or thyroid nodules.  Respiratory: The nature of the breathing and chest expansion/symmetry was observed.  Cardiovascular: The patient was examined to determine the presence of any edema or jugular venous distension.  Abdomen: The contour of the abdomen was noted.  Lymphatic: The patient was examined for infraclavicular lymphadenopathy.  Musculoskeletal: The patient was inspected for the presence of skeletal deformities.  Extremities: The extremities were examined for any clubbing or cyanosis.  Skin: The skin was examined for inflammatory or neoplastic conditions.  Neurologic: The patient's orientation, mood, and affect were noted. The cranial nerve  functions were examined.  Other  pertinent positive and negative findings on physical examination:   OC/OP: no lesions, uvula midline, soft palate elevates symmetrically   Neck: no lesions, no TH tenderness to palpation     All other physical examination findings were within normal limits and noncontributory.    Procedures   Flexible laryngoscopy (CPT 81764)      Pre-procedure diagnosis: globus sensation  Post-procedure diagnosis: same as above  Indication for procedure: Mr. Wang is a 41 year old male with see above  Procedure(s): Fiberoptic Laryngoscopy    Details of Procedure: After informed consent was obtained, the patient was seated in the examination chair.  The areas of the nasopharynx as well as the hypopharynx were anesthetized with topical 4% lidocaine with 0.25% phenylephrine atomizer.  Examination of the base of tongue was performed first.  Attention was directed to any evidence of masses in the area or evidence of leukoplakia or candidal infection.  Attention was directed to the epiglottis where its size and position was determined and its movement on phonation of the vowel  e .  The piriform sinuses were then inspected for any mass lesions or pooling of secretions.  Attention was then directed to the larynx. The vocal folds were inspected for infection or any areas of leukoplakia, for masses, polypoid degeneration, or hemorrhage.  Having done this, the arytenoids and vocal processes were inspected for erythema or evidence of granuloma formation.  The posterior commissure was then inspected for evidence of inflammatory changes in the mucosa and heaping up of mucosal tissue. The patient was then instructed to say the vowel  e .  Adduction of vocal folds to the midline was observed for any evidence of paresis or paralysis of the larynx or asymmetry in rotation of the larynx to the left or right. The patient was asked to breathe and the degree of abduction was noted bilaterally.  Subglottic view of the larynx was obtained for any  additional mass lesions or mucosal changes.  Finally the post cricoid was examined for evidence of pooling of secretions, as well as the pharyngeal wall mucosa.   Anesthesia type: 0.25% phenylephrine    Findings:  Anatomic/physiological deviations: RNC, arytenoid hooding, posterior cricoid with white areas that look like candida - these are stable   Right vocal process: No restriction of mobility   Left vocal process: No restriction of mobility  Glottal gap: Complete glottal closure  Supraglottic structures: Normal  Hypopharynx: Normal     Estimated Blood Loss: minimal  Complications: None  Disposition: Patient tolerated the procedure well                Review of Relevant Clinical Data   I personally reviewed:     Labs:  Lab Results   Component Value Date    TSH 0.98 11/20/2020     Lab Results   Component Value Date     10/05/2021    CO2 30 10/05/2021    BUN 12 10/05/2021     Lab Results   Component Value Date    WBC 8.5 11/20/2020    HGB 14.1 11/20/2020    HCT 41.3 11/20/2020    MCV 78 11/20/2020     11/20/2020     No results found for: PT, PTT, INR  No results found for: DEMARCUS  No components found for: RHEUMATOIDFACTOR,  RF  Lab Results   Component Value Date    CRP <2.9 08/27/2020    CRP <2.9 08/14/2020     No components found for: CKTOT, URICACID  No components found for: C3, C4, DSDNAAB, NDNAABIFA  No results found for: MPOAB    Patient reported Quality of Life (QOL) Measures   Patient Supplied Answers To VHI Questionnaire  No flowsheet data found.      Patient Supplied Answers To EAT Questionnaire  Eating Assessment Tool (EAT-10) 5/17/2022   My swallowing problem has caused me to lose weight 1   My swallowing problem interferes with my ability to go out for meals 0   Swallowing liquids takes extra effort 0   Swallowing solids takes extra effort 0   Swallowing pills takes extra effort 2   Swallowing is painful 0   The pleasure of eating is affected by my swallowing 0   When I swallow food sticks in  my throat 2   I cough when I eat 0   Swallowing is stressful 1   EAT-10 6         Patient Supplied Answers To CSI Questionnaire  No flowsheet data found.      Patient Supplied Answers to Dyspnea Index Questionnaire:  No flowsheet data found.    Impression & Plan     IMPRESSION: Mr. Wang is a 41 year old male who is being seen for the followin. Dysphagia  - tightness with certain foods  - like tomato and coffee  - otherwise denies upper airway symptoms to be bothersome  - CT neck 20 - patent subglottis  - scope today 22 shows arytenoid hooding, posterior cricoid with white areas that look like candida   - FEES today shows no penetration, residue, or aspiration  - symptoms likely esophageal due to reflux   - discussed Xray Video Swallow Exam and esophagram  and further workup with GI, with endoscopy and pH testing - was told December for follow up will reach out again  Plan  - follow with Dr. Vuong for endoscopy and pH testing - reached out to Dr Vuong  - do Xray Video Swallow Exam with esophagram       2. Pharyngeal candidiasis   - denies using inhalers or recent antibiotics   - scope today shows possible pharyngeal thrush, will treat empirically   - took nystatin  - scope today shows stable appearance  - symptomatically he feels better therefore will proceed with observation and he will have an EGD as well to rule out esophageal candiadiasis  Plan  - 3 month follow up      3. Nasal congestion   - reports itchy nose, eyes, and congestion  - has not seen an allergist   -  discussed using Netti-Pot and Flonase until next appointment and if not improved at that time will consider allergist referral  Plan  - Netti-Pot followed by Flonase BID     RETURN VISIT: 3 months    Dixie Walker MD    Laryngology    [facility]  Department of  Otolaryngology - Head and Neck Surgery  Clinics & Surgery Center  [address] [place] [id number]  Appointment line:  [phone]  Fax: [phone]  [email or web address]         Again, thank you for allowing me to participate in the care of your patient.      Sincerely,    Dixie Walker MD

## 2022-06-10 NOTE — LETTER
Date:June 13, 2022      Patient was self referred, no letter generated. Do not send.        [facility] Health Information

## 2022-06-13 ENCOUNTER — TELEPHONE (OUTPATIENT)
Dept: GASTROENTEROLOGY | Facility: CLINIC | Age: 42
End: 2022-06-13
Payer: COMMERCIAL

## 2022-06-13 DIAGNOSIS — Z01.812 PRE-PROCEDURE LAB EXAM: Primary | ICD-10-CM

## 2022-06-13 NOTE — TELEPHONE ENCOUNTER
Screening Questions  BlueKIND OF PREP RedLOCATION [review exclusion criteria] GreenSEDATION TYPE      1. Are you able to give consent for your medical care?y Do you have a legal guardian or medical Power of ?   (Sedation review/consideration needed)    2. Have you had a positive covid test in the last 90 days? n  a. If yes, what date?    3. Are you active on mychart? y    4. What insurance is in the chart? [employer]     3.   Ordering/Referring Provider: 37.2    4. BMI 37.1 [BMI OVER 40-EXTENDED PREP]  If greater than 40 review exclusion criteria [PAC APPT IF @ [facility]]        5.  Respiratory Screening :  [If yes to any of the following HOSPITAL setting only]     Do you use daily home oxygen? N    Do you have mod to severe Obstructive Sleep Apnea? N  [OKAY @ [facility] SH PH RI]   Do you have Pulmonary Hypertension? N     Do you have UNCONTROLLED asthma? N        6.   Have you had a heart or lung transplant? N      7.   Are you currently on dialysis? N [ If yes, G-PREP & HOSPITAL setting only]     8.   Do you have chronic kidney disease? N [ If yes, G-PREP ]    9.   Have you had a stroke or Transient ischemic attack (TIA - aka  mini stroke ) within 6 months?  N (If yes, please review exclusion criteria)    10.   In the past 6 months, have you had any heart related issues including cardiomyopathy or heart attack? N           If yes, did it require cardiac stenting or other implantable device?       11.   Do you have any implantable devices in your body (pacemaker, defib, LVAD)? N (If yes, please review exclusion criteria)    12.   Do you take nitroglycerin? N           If yes, how often?   (if yes, HOSPITAL setting ONLY)    13.   Are you currently taking any blood thinners? N           [IF YES, INFORM PATIENT TO FOLLOW UP W/ ORDERING PROVIDER FOR BRIDGING INSTRUCTIONS]     14.   Do you have a diagnosis of diabetes? N   [ If yes, G-PREP ]    15.   [FEMALES] Are you currently pregnant?     If yes, how many weeks?      16.   Are you taking any prescription pain medications on a routine schedule?  N  [ If yes, EXTENDED PREP.] [If yes, MAC]    17.   Do you have any chemical dependencies such as alcohol, street drugs, or methadone?  N [If yes, MAC]    18.   Do you have any history of post-traumatic stress syndrome, severe anxiety or history of psychosis? SOME  ANXIETY  [If yes, MAC]    19.   Do you transfer independently?  Y    20.  On a regular basis do you go 3-5 days between bowel movements?    [ If yes, EXTENDED PREP.]    21.   Preferred LOCAL Pharmacy for Pre Prescription      [facility] [place] - [address] [id number] - [place], [place] - [address] [facility] [address] [facility] [address]      Scheduling Details      Caller : Isacc Wang Jr.    (Please ask for phone number if not scheduled by patient)    Type of Procedure Scheduled: EGD/BRAVO  Which Colonoscopy Prep was Sent?:   DEBIORUTS CF PATIENTS & GROEN'S PATIENTS REQUIRE EXTENDED PREP  Surgeon: JACKSON  Date of Procedure: 8/18/22  [place] [facility]      Sedation Type: MAC  Conscious Sedation- Needs  for 6 hours after the procedure  MAC/General-Needs  for 24 hours after procedure    Pre-op Required at [facility] [place], [place] and OR for MAC sedation: N  (advise patient they will need a pre-op prior to procedure -)      Informed patient they will need an adult  Y  Cannot take any type of public or medical transportation alone    Pre-Procedure Covid test to be completed at [facility] or Externally: WILL DO @PRIMARY    Confirmed Nurse will call to complete assessment     Additional comments:

## 2022-07-28 ENCOUNTER — IMMUNIZATION (OUTPATIENT)
Dept: NURSING | Facility: CLINIC | Age: 42
End: 2022-07-28
Payer: COMMERCIAL

## 2022-07-28 PROCEDURE — 91305 COVID-19,PF,PFIZER (12+ YRS): CPT

## 2022-07-28 PROCEDURE — 0054A COVID-19,PF,PFIZER (12+ YRS): CPT

## 2022-08-10 ENCOUNTER — TELEPHONE (OUTPATIENT)
Dept: GASTROENTEROLOGY | Facility: CLINIC | Age: 42
End: 2022-08-10

## 2022-08-10 NOTE — TELEPHONE ENCOUNTER
Pre assessment questions completed for upcoming EGD BRAVO procedure scheduled on 8.18.2022    COVID test 8.15.2022    Reviewed procedural arrival time 0745 and facility location [facility].    Designated  policy reviewed. Instructed to have someone stay 24 hours post procedure.     Anticoagulation/blood thinners? no    Electronic implanted devices? no    Reviewed EGD BRAVO prep instructions with patient.     Patient verbalized understanding and had no questions or concerns at this time.    Taylor Barfield RN

## 2022-08-12 ENCOUNTER — PATIENT OUTREACH (OUTPATIENT)
Dept: GASTROENTEROLOGY | Facility: CLINIC | Age: 42
End: 2022-08-12

## 2022-08-12 NOTE — PROGRESS NOTES
Reached out to pt to go over information and instructions for upcoming Ta test.  Pt would like to talk in further detail on Monday and will stop Prilosec starting today.

## 2022-08-15 ENCOUNTER — LAB (OUTPATIENT)
Dept: URGENT CARE | Facility: URGENT CARE | Age: 42
End: 2022-08-15
Attending: INTERNAL MEDICINE
Payer: COMMERCIAL

## 2022-08-15 ENCOUNTER — PATIENT OUTREACH (OUTPATIENT)
Dept: GASTROENTEROLOGY | Facility: CLINIC | Age: 42
End: 2022-08-15

## 2022-08-15 DIAGNOSIS — Z01.812 PRE-PROCEDURE LAB EXAM: ICD-10-CM

## 2022-08-15 LAB — SARS-COV-2 RNA RESP QL NAA+PROBE: NEGATIVE

## 2022-08-15 PROCEDURE — U0005 INFEC AGEN DETEC AMPLI PROBE: HCPCS

## 2022-08-15 PROCEDURE — U0003 INFECTIOUS AGENT DETECTION BY NUCLEIC ACID (DNA OR RNA); SEVERE ACUTE RESPIRATORY SYNDROME CORONAVIRUS 2 (SARS-COV-2) (CORONAVIRUS DISEASE [COVID-19]), AMPLIFIED PROBE TECHNIQUE, MAKING USE OF HIGH THROUGHPUT TECHNOLOGIES AS DESCRIBED BY CMS-2020-01-R: HCPCS

## 2022-08-15 NOTE — PROGRESS NOTES
Reached back out to pt to further discuss prep for Bravo study.  Sent pt instructions via email  Discussed Bravo and recording process verbally  Monitor return visit scheduled for 8/23 at 8 am    Email: [email or web address]    Thanks!  Inge Flores RN Motility Nurse  Non Invasive GI Procedures  Phone: [phone]       Instructions for Your Upper Endoscopy with Bravo Capsule Placement  Your exam is on 8/18  Please note that your procedure time may change  Check in at: [facility]; [address] [place] [address], 5th Floor, [place] [address]    Copy and paste the link below into your Internet browser for viewing:    [email or web address]    What is an upper endoscopy?  - This is an exam that checks for problems linked to heartburn, swallowing or belly (abdominal) pain or other symptoms of the upper GI tract. Depending on your symptoms, the doctor will look at your esophagus (food pipe), stomach and the part of the stomach that enters the small intesttine.     Getting ready  - You must arrange for an adult to drive you home after your exam.   - Your exam cannot be done unless you have proper transportation. If you need to use public transportation someone must ride with you.  - Dress in comfortable, loose clothing.  - Bring your insurance card. Leave your purse, billfold, credit cards and other valuables at home.  - Bring a list of your medicines and known allergies. If you have a pacemaker or ICD, please bring your information card.  - We do our best to stay on time, but there may be a delay. Please bring something to pass the time, such as a newspaper or book.    - Important: You must complete all steps before the exam.     Seven days before the exam   - Talk to your doctor: If you take blood-thinners (such as Coumadin, Plavix, Xarelto), your prescription or schedule may need to change before the test.  - Continue taking prescribed aspirin; talk to your prescribing doctor with any concerns.  - Stop  taking any acid reducing medications (Aciphex, Axid, cimetidine, esomeprazole, famotidine, Losec, metoclopramide, Nexium, nizatidine, omeprazole, pantoprazole, Pepcid, Prilosec, Propulsid, Protonix, rabeprazole, ranitidine, Reglan, Tagamet and Zantac).     - If you have diabetes: Ask to have your exam early in the morning. Also, ask your doctor if you should change your diet or medicines    One day before the exam   -Stop eating all solid foods at 10 p.m. You may drink clear liquids.     Day of the exam   -You may drink clear liquids until 6 hours before your exam.  -You may take all of your morning medicines (except for diabetes pills) as usual with 4 oz. of water up to 4 hours before your test.  -If you take diabetes medicine (pills): do not take them the morning of your test.  -Bring a list of your medicines and known allergies.  -Please arrive with an adult who can take you home after the test: The medicine will make you sleepy. If you do not have a , we may cancel your test.     What are clear liquids?   You may have:  - Water, tea, coffee (no cream)  - Soda pop, Gatorade (not red or purple)  - Clear nutrition drinks (Enlive, Resource Breeze)   - Jell-O, Popsicles (no milk or fruit pieces) or sorbet (not red or purple)  - Fat-free soup broth or bouillon  - Plain hard cand, such as clear life savers (not red or purple)  - Clear juices and fruit-flavored drinks such as apple juice, white grape juice, Hi-C and Pepito-Aid (not red or purple)   Do not have:  - Milk or milk products such as ice cream, malts or shakes  - Red or purple drinks of any kind such as cranberry juice or grape juice. Avoid red or purple Jell-O, Popsicles, Pepito-Aid, sorbet and candy  - Juices with pulp such as orange, grapefruit, pineapple or tomato juice  - Cream soups of any kind  - Alcohol       During the exam  - The exam lasts from 10 to 20 minutes.   - We will review the risks and benefits of the exam. We will then ask you to sign a  consent form.  - We will place a small needle (IV) in your hand or arm. We will give you medicine through the IV to keep you comfortable.   -We will spray a numbing medicine into your throat. This will reduce gagging.   - We will help you swallow a flexible tube (the endoscope). You may feel some discomfort at first. The tube will not get in the way of your breathing.  - You will not be able to talk with the endoscope in your mouth. Use hand signals instead.  - When we put air into your stomach, you may feel full or have mild cramps. We will remove the air at the end of the exam.  - To reduce discomfort, breathe at a slow, even pace. Try to relax the muscles in your neck and shoulders.  - We may take a small piece of tissue (a biopsy) to test in the lab. It will not hurt. We may also take pictures of your insides.     After the exam  - You will rest in the recovery area until you feel ready to leave. This takes about 30 to 60 minutes.   - We will remove the IV.  - You may burp up air left in your stomach.  - You may feel drowsy or a little dizzy from the medicine.   - Your doctor will discuss your results.  - Your throat may feel numb. One hour after the exam, swallow a small amount of cool water. If you can swallow easily, you may go back to your regular diet and medicines.  - Your throat may be sore for the rest of the day. Throat lozenges or ice chips may help.  - Do not drive for 24 hours.    Bravo Capsule Instructions  The capsule we placed in your food pipe will record acid levels over 96 hours. You may do your usual activities during this time. To help get clear results, follow these directions:    Do not take aspirin or anti-inflammatory pain medicines (ibuprofen, naproxin, Motrin, Advil, Nuprin, Aleve)    Do not take acid reducers (Aciphex, Axid, cimetidine, esomeprazole, famotidine, Losec, metoclopramide, Nexium, nizatidine, omeprazole, pantoprazole, Pepcid, Prilosec, Propulsid, Protonix, rabeprazole,  ranitidine, Reglan, Tagamet and Zantac).    Eat no more than 3 to 4 meals a day (no gum, hard candy or snacking in between meals). Stay upright for at least 2 hours after dinner, if you can.    Refer to the time shown on your recorder, not your clock or watch.    If the  beeps, you may be more than 3 feet away from the  or near other electronics. Hold it to your chest for 30 seconds to reset it. You will hear a beep and the light will flash for 3 seconds to let you know the event was recorded.    If the recorder is on standby (backlight is off), press any button to turn it back on. (Nothing will be recorded.) Next, press the desired button to record an event.    How to record    Meals: Press the Meal button on your recorder at the start of a meal; it will stay lit. Press it again at the end of the meal to turn it off. Write the start and end times in your diary.    Beverages (other than water): Press the Meal button. Finish drinking within 25 minutes. Write the start and end times in your diary.    Lying down and getting up: Press the Supine button when you lie down; it will stay lit. Press it again when you get up (even if just getting up to use the bathroom). The light will turn off. Write the start and end times in your diary.    Symptoms: Press the Symptom button, and write the time in your diary. If symptoms lasts longer than 15 minutes, press the button again.    Symptoms may include:  - Belly pain, chest pain or heartburn  - Coughing or wheezing  - Burping or bringing swallowed food back up into your mouth,  - Feeling sick to your stomach or throwing up  - Trouble swallowing.    Medicines. Log any medicine times in your diary.  (No need to press a button.) Take Acetaminophen (Tylenol) for discomfort. Do not take aspirin, antiinflammatories (ibuprofen) or antacids. Ask your pharmacist if you have questions.    Caring for the recorder  Please keep the unit in its case at all times. Handle it  with care.    Do not drop it    Do not get it wet. You may take a shower or tub bath, but leave the recorder on a counter nearby.    Never turn off the monitor or disconnect it. It will turn off by itself after 96 hours.    If you take the recorder off while sleeping, place it on a stable surface near your chest and within an arm s reach.    After the test    The capsule will pass naturally out of your body within 7 to 10 days with a bowel movement.    The capsule contains a small magnet. Do not have an MRI test for 30 days. An MRI could harm you if the capsule has not passed out of your body.  Bring the recorder and diary back to: [facility] [address] [place] [address]: Check in on the 3rd floor.  8/23 Tuesday at 8 am    Call us at once if you have:  - Unusual pain or problems swallowing, unusual stomach or chest pain.  - Vomit that looks like coffee grounds or black or bloody stools (bowel movements).  - A fever above 100.6  F (37.5  C) when taken under the tongue.    Test results  - You will receive your results in 7 to 10 business days by phone, letter or [employer].    Covid Testing Requirements:     One to two days before your procedure, take an at-home, rapid antigen test. You can purchase these at many stores and pharmacies. You can order free, at-home tests from the federal government by visiting the website covid.gov/tests. If you are unable to find an at-home, rapid antigen test, schedule a PCR test with  [facility] by calling [phone]. You can also schedule your PCR test by signing into or signing up for [employer] and completing a COVID-19 symptom check. Once complete, you will get a message sent to your home screen. Use that message to schedule your asymptomatic COVID-19 test. Make sure to choose only COVID-19 testing and indicate you are not symptomatic. Do not choose testing for multiple illnesses (COVID-19, influenza, strep, RSV).    We can t accept at-home, rapid antigen test  results that are more than four days old.     If your test is negative, please take a photo of the test. Show the photo to the nurse when you come in for your procedure.     If your test is positive, please call [phone], option 2 to discuss rescheduling options.  You will not be able to complete procedure with a positive test result.    For questions or appointments, call:  [facility]: [phone], option 2  Monday through Friday, 8 a.m. to 4:30 p.m.  (If it is after hours please reach out to the clinic or provider you were scheduled with.)

## 2022-08-18 ENCOUNTER — ANESTHESIA EVENT (OUTPATIENT)
Dept: SURGERY | Facility: AMBULATORY SURGERY CENTER | Age: 42
End: 2022-08-18
Payer: COMMERCIAL

## 2022-08-18 ENCOUNTER — HOSPITAL ENCOUNTER (OUTPATIENT)
Facility: AMBULATORY SURGERY CENTER | Age: 42
Discharge: HOME | End: 2022-08-18
Attending: INTERNAL MEDICINE
Payer: COMMERCIAL

## 2022-08-18 ENCOUNTER — ANESTHESIA (OUTPATIENT)
Dept: SURGERY | Facility: AMBULATORY SURGERY CENTER | Age: 42
End: 2022-08-18
Payer: COMMERCIAL

## 2022-08-18 ENCOUNTER — MEDICAL CORRESPONDENCE (OUTPATIENT)
Dept: HEALTH INFORMATION MANAGEMENT | Facility: CLINIC | Age: 42
End: 2022-08-18

## 2022-08-18 VITALS
RESPIRATION RATE: 16 BRPM | HEART RATE: 65 BPM | DIASTOLIC BLOOD PRESSURE: 79 MMHG | TEMPERATURE: 96.9 F | WEIGHT: 225 LBS | SYSTOLIC BLOOD PRESSURE: 128 MMHG | OXYGEN SATURATION: 96 % | BODY MASS INDEX: 35.31 KG/M2 | HEIGHT: 67 IN

## 2022-08-18 VITALS — HEART RATE: 66 BPM

## 2022-08-18 LAB — UPPER GI ENDOSCOPY: NORMAL

## 2022-08-18 PROCEDURE — 91035 G-ESOPH REFLX TST W/ELECTROD: CPT

## 2022-08-18 PROCEDURE — 43235 EGD DIAGNOSTIC BRUSH WASH: CPT | Mod: 59

## 2022-08-18 RX ORDER — FLUMAZENIL 0.1 MG/ML
0.2 INJECTION, SOLUTION INTRAVENOUS
Status: ACTIVE | OUTPATIENT
Start: 2022-08-18 | End: 2022-08-18

## 2022-08-18 RX ORDER — LIDOCAINE 40 MG/G
CREAM TOPICAL
Status: DISCONTINUED | OUTPATIENT
Start: 2022-08-18 | End: 2022-08-18 | Stop reason: HOSPADM

## 2022-08-18 RX ORDER — NALOXONE HYDROCHLORIDE 0.4 MG/ML
0.2 INJECTION, SOLUTION INTRAMUSCULAR; INTRAVENOUS; SUBCUTANEOUS
Status: DISCONTINUED | OUTPATIENT
Start: 2022-08-18 | End: 2022-08-19 | Stop reason: HOSPADM

## 2022-08-18 RX ORDER — ONDANSETRON 2 MG/ML
4 INJECTION INTRAMUSCULAR; INTRAVENOUS EVERY 6 HOURS PRN
Status: DISCONTINUED | OUTPATIENT
Start: 2022-08-18 | End: 2022-08-19 | Stop reason: HOSPADM

## 2022-08-18 RX ORDER — NALOXONE HYDROCHLORIDE 0.4 MG/ML
0.4 INJECTION, SOLUTION INTRAMUSCULAR; INTRAVENOUS; SUBCUTANEOUS
Status: DISCONTINUED | OUTPATIENT
Start: 2022-08-18 | End: 2022-08-19 | Stop reason: HOSPADM

## 2022-08-18 RX ORDER — PROCHLORPERAZINE MALEATE 10 MG
10 TABLET ORAL EVERY 6 HOURS PRN
Status: DISCONTINUED | OUTPATIENT
Start: 2022-08-18 | End: 2022-08-19 | Stop reason: HOSPADM

## 2022-08-18 RX ORDER — PROPOFOL 10 MG/ML
INJECTION, EMULSION INTRAVENOUS CONTINUOUS PRN
Status: DISCONTINUED | OUTPATIENT
Start: 2022-08-18 | End: 2022-08-18

## 2022-08-18 RX ORDER — LIDOCAINE HYDROCHLORIDE 20 MG/ML
INJECTION, SOLUTION INFILTRATION; PERINEURAL PRN
Status: DISCONTINUED | OUTPATIENT
Start: 2022-08-18 | End: 2022-08-18

## 2022-08-18 RX ORDER — ONDANSETRON 4 MG/1
4 TABLET, ORALLY DISINTEGRATING ORAL EVERY 6 HOURS PRN
Status: DISCONTINUED | OUTPATIENT
Start: 2022-08-18 | End: 2022-08-19 | Stop reason: HOSPADM

## 2022-08-18 RX ORDER — PROPOFOL 10 MG/ML
INJECTION, EMULSION INTRAVENOUS PRN
Status: DISCONTINUED | OUTPATIENT
Start: 2022-08-18 | End: 2022-08-18

## 2022-08-18 RX ORDER — ONDANSETRON 2 MG/ML
4 INJECTION INTRAMUSCULAR; INTRAVENOUS
Status: DISCONTINUED | OUTPATIENT
Start: 2022-08-18 | End: 2022-08-18 | Stop reason: HOSPADM

## 2022-08-18 RX ORDER — SODIUM CHLORIDE, SODIUM LACTATE, POTASSIUM CHLORIDE, CALCIUM CHLORIDE 600; 310; 30; 20 MG/100ML; MG/100ML; MG/100ML; MG/100ML
INJECTION, SOLUTION INTRAVENOUS CONTINUOUS PRN
Status: DISCONTINUED | OUTPATIENT
Start: 2022-08-18 | End: 2022-08-18

## 2022-08-18 RX ADMIN — LIDOCAINE HYDROCHLORIDE 50 MG: 20 INJECTION, SOLUTION INFILTRATION; PERINEURAL at 08:49

## 2022-08-18 RX ADMIN — PROPOFOL 50 MG: 10 INJECTION, EMULSION INTRAVENOUS at 08:51

## 2022-08-18 RX ADMIN — SODIUM CHLORIDE, SODIUM LACTATE, POTASSIUM CHLORIDE, CALCIUM CHLORIDE: 600; 310; 30; 20 INJECTION, SOLUTION INTRAVENOUS at 07:10

## 2022-08-18 RX ADMIN — PROPOFOL 200 MCG/KG/MIN: 10 INJECTION, EMULSION INTRAVENOUS at 08:49

## 2022-08-18 RX ADMIN — PROPOFOL 50 MG: 10 INJECTION, EMULSION INTRAVENOUS at 08:49

## 2022-08-18 NOTE — ANESTHESIA POSTPROCEDURE EVALUATION
Patient: Isacc Wang Jr.    Procedure: Procedure(s):  ESOPHAGOGASTRODUODENOSCOPY, WITH BRAVO PH MONITORING DEVICE INSERTION       Anesthesia Type:  MAC    Note:  Disposition: Outpatient   Postop Pain Control: Uneventful            Sign Out: Well controlled pain   PONV: No   Neuro/Psych: Uneventful            Sign Out: Acceptable/Baseline neuro status   Airway/Respiratory: Uneventful            Sign Out: Acceptable/Baseline resp. status   CV/Hemodynamics: Uneventful            Sign Out: Acceptable CV status; No obvious hypovolemia; No obvious fluid overload   Other NRE: NONE   DID A NON-ROUTINE EVENT OCCUR? No           Last vitals:  Vitals Value Taken Time   /79 08/18/22 0923   Temp 36.1  C (96.9  F) 08/18/22 0908   Pulse 65 08/18/22 0923   Resp 16 08/18/22 0923   SpO2 96 % 08/18/22 0923       Electronically Signed By: Grabiel Hamilton MD  August 18, 2022  11:06 AM

## 2022-08-18 NOTE — OR NURSING
[facility] OR [place]  [address]  5TH FLOOR  [place] [address] [phone]    2022    Isacc Wang Jr.  [address]   [facility] [address]  [phone] (home)     :  1980    To Whom it May Concern:    Isacc Wang Jr. missed work on 2022 due to an outpatient procedure.    Please contact me for any questions or concerns.    Sincerely,      MARIA LUISA [address]os [address]    [phone]

## 2022-08-18 NOTE — LETTER
August 10, 2022      Isacc Wang Jr.  [address]   [place] [facility] [address]              Dear Isacc,        Instructions for Your Upper Endoscopy with Bravo Capsule Placement  Your exam is on 8.18.2022  Please note that your procedure time may change  Check in at: [facility]; [address], 5th Floor, [place] [address]    Copy and paste the link below into your Internet browser for viewing:    [email or web address]    What is an upper endoscopy?  - This is an exam that checks for problems linked to heartburn, swallowing or belly (abdominal) pain or other symptoms of the upper GI tract. Depending on your symptoms, the doctor will look at your esophagus (food pipe), stomach and the part of the stomach that enters the small intesttine.     Getting ready  - You must arrange for an adult to drive you home after your exam.   - Your exam cannot be done unless you have proper transportation. If you need to use public transportation someone must ride with you.  - Dress in comfortable, loose clothing.  - Bring your insurance card. Leave your purse, billfold, credit cards and other valuables at home.  - Bring a list of your medicines and known allergies. If you have a pacemaker or ICD, please bring your information card.  - We do our best to stay on time, but there may be a delay. Please bring something to pass the time, such as a newspaper or book.    - Important: You must complete all steps before the exam.     Seven days before the exam   - Talk to your doctor: If you take blood-thinners (such as Coumadin, Plavix, Xarelto), your prescription or schedule may need to change before the test.  - Continue taking prescribed aspirin; talk to your prescribing doctor with any concerns.  - Stop taking any acid reducing medications (Aciphex, Axid, cimetidine, esomeprazole, famotidine, Losec, metoclopramide, Nexium, nizatidine, omeprazole, pantoprazole, Pepcid, Prilosec, Propulsid, Protonix,  rabeprazole, ranitidine, Reglan, Tagamet and Zantac).     - If you have diabetes: Ask to have your exam early in the morning. Also, ask your doctor if you should change your diet or medicines    One day before the exam   -Stop eating all solid foods at 10 p.m. You may drink clear liquids.     Day of the exam   -You may drink clear liquids until 6 hours before your exam.  -You may take all of your morning medicines (except for diabetes pills) as usual with 4 oz. of water up to 4 hours before your test.  -If you take diabetes medicine (pills): do not take them the morning of your test.  -Bring a list of your medicines and known allergies.  -Please arrive with an adult who can take you home after the test: The medicine will make you sleepy. If you do not have a , we may cancel your test.     What are clear liquids?   You may have:  - Water, tea, coffee (no cream)  - Soda pop, Gatorade (not red or purple)  - Clear nutrition drinks (Enlive, Resource Breeze)   - Jell-O, Popsicles (no milk or fruit pieces) or sorbet (not red or purple)  - Fat-free soup broth or bouillon  - Plain hard cand, such as clear life savers (not red or purple)  - Clear juices and fruit-flavored drinks such as apple juice, white grape juice, Hi-C and Pepito-Aid (not red or purple)   Do not have:  - Milk or milk products such as ice cream, malts or shakes  - Red or purple drinks of any kind such as cranberry juice or grape juice. Avoid red or purple Jell-O, Popsicles, Pepito-Aid, sorbet and candy  - Juices with pulp such as orange, grapefruit, pineapple or tomato juice  - Cream soups of any kind  - Alcohol       During the exam  - The exam lasts from 10 to 20 minutes.   - We will review the risks and benefits of the exam. We will then ask you to sign a consent form.  - We will place a small needle (IV) in your hand or arm. We will give you medicine through the IV to keep you comfortable.   -We will spray a numbing medicine into your throat. This  will reduce gagging.   - We will help you swallow a flexible tube (the endoscope). You may feel some discomfort at first. The tube will not get in the way of your breathing.  - You will not be able to talk with the endoscope in your mouth. Use hand signals instead.  - When we put air into your stomach, you may feel full or have mild cramps. We will remove the air at the end of the exam.  - To reduce discomfort, breathe at a slow, even pace. Try to relax the muscles in your neck and shoulders.  - We may take a small piece of tissue (a biopsy) to test in the lab. It will not hurt. We may also take pictures of your insides.     After the exam  - You will rest in the recovery area until you feel ready to leave. This takes about 30 to 60 minutes.   - We will remove the IV.  - You may burp up air left in your stomach.  - You may feel drowsy or a little dizzy from the medicine.   - Your doctor will discuss your results.  - Your throat may feel numb. One hour after the exam, swallow a small amount of cool water. If you can swallow easily, you may go back to your regular diet and medicines.  - Your throat may be sore for the rest of the day. Throat lozenges or ice chips may help.  - Do not drive for 24 hours.    Bravo Capsule Instructions  The capsule we placed in your food pipe will record acid levels over 96 hours. You may do your usual activities during this time. To help get clear results, follow these directions:    Do not take aspirin or anti-inflammatory pain medicines (ibuprofen, naproxin, Motrin, Advil, Nuprin, Aleve)    Do not take acid reducers (Aciphex, Axid, cimetidine, esomeprazole, famotidine, Losec, metoclopramide, Nexium, nizatidine, omeprazole, pantoprazole, Pepcid, Prilosec, Propulsid, Protonix, rabeprazole, ranitidine, Reglan, Tagamet and Zantac).    Eat no more than 3 to 4 meals a day (no gum, hard candy or snacking in between meals). Stay upright for at least 2 hours after dinner, if you can.    Refer  to the time shown on your recorder, not your clock or watch.    If the  beeps, you may be more than 3 feet away from the  or near other electronics. Hold it to your chest for 30 seconds to reset it. You will hear a beep and the light will flash for 3 seconds to let you know the event was recorded.    If the recorder is on standby (backlight is off), press any button to turn it back on. (Nothing will be recorded.) Next, press the desired button to record an event.    How to record    Meals: Press the Meal button on your recorder at the start of a meal; it will stay lit. Press it again at the end of the meal to turn it off. Write the start and end times in your diary.    Beverages (other than water): Press the Meal button. Finish drinking within 25 minutes. Write the start and end times in your diary.    Lying down and getting up: Press the Supine button when you lie down; it will stay lit. Press it again when you get up (even if just getting up to use the bathroom). The light will turn off. Write the start and end times in your diary.    Symptoms: Press the Symptom button, and write the time in your diary. If symptoms lasts longer than 15 minutes, press the button again.    Symptoms may include:  - Belly pain, chest pain or heartburn  - Coughing or wheezing  - Burping or bringing swallowed food back up into your mouth,  - Feeling sick to your stomach or throwing up  - Trouble swallowing.    Medicines. Log any medicine times in your diary.  (No need to press a button.) Take Acetaminophen (Tylenol) for discomfort. Do not take aspirin, antiinflammatories (ibuprofen) or antacids. Ask your pharmacist if you have questions.    Caring for the recorder  Please keep the unit in its case at all times. Handle it with care.    Do not drop it    Do not get it wet. You may take a shower or tub bath, but leave the recorder on a counter nearby.    Never turn off the monitor or disconnect it. It will turn off by  itself after 96 hours.    If you take the recorder off while sleeping, place it on a stable surface near your chest and within an arm s reach.    After the test    The capsule will pass naturally out of your body within 7 to 10 days with a bowel movement.    The capsule contains a small magnet. Do not have an MRI test for 30 days. An MRI could harm you if the capsule has not passed out of your body.  Bring the recorder and diary back to: [facility] [address] [place] [address]: Check in on the 3rd floor.    Call us at once if you have:  - Unusual pain or problems swallowing, unusual stomach or chest pain.  - Vomit that looks like coffee grounds or black or bloody stools (bowel movements).  - A fever above 100.6  F (37.5  C) when taken under the tongue.    Test results  - You will receive your results in 7 to 10 business days by phone, letter or [employer].    Covid Testing Requirements:     One to two days before your procedure, take an at-home, rapid antigen test. You can purchase these at many stores and pharmacies. You can order free, at-home tests from the federal government by visiting the website covid.gov/tests. If you are unable to find an at-home, rapid antigen test, schedule a PCR test with [employer] by calling [phone]. You can also schedule your PCR test by signing into or signing up for [employer] and completing a COVID-19 symptom check. Once complete, you will get a message sent to your home screen. Use that message to schedule your asymptomatic COVID-19 test. Make sure to choose only COVID-19 testing and indicate you are not symptomatic. Do not choose testing for multiple illnesses (COVID-19, influenza, strep, RSV).    We can t accept at-home, rapid antigen test results that are more than four days old.     If your test is negative, please take a photo of the test. Show the photo to the nurse when you come in for your procedure.     If your test is positive, please call  [phone], option 2 to discuss rescheduling options.  You will not be able to complete procedure with a positive test result.    For questions or appointments, call:  [facility]: [phone], option 2  Monday through Friday, 8 a.m. to 4:30 p.m.  (If it is after hours please reach out to the clinic or provider you were scheduled with.)

## 2022-08-18 NOTE — H&P
Isacc Wang JrGui  [id number]  male  42 year old      Reason for procedure/surgery: egd, bravo    Patient Active Problem List   Diagnosis     Hyperlipidemia LDL goal <130     Acute idiopathic gout of right foot     Family history of colon cancer     Obesity (BMI 35.0-39.9) with comorbidity (H)     Esophageal stricture       Past Surgical History:    Past Surgical History:   Procedure Laterality Date     COLONOSCOPY WITH CO2 INSUFFLATION N/A 10/26/2017    Procedure: COLONOSCOPY WITH CO2 INSUFFLATION;  COLON SCREEN/ FAMILY HX/ SPECIAL SCREENING FOR MALIGNANT NEOPLASMS/ ZOWNIROWYCZ;  Surgeon: Alfonso Ortega MD;  Location: [facility]     ZZC NONSPECIFIC PROCEDURE      cyst removed right ear       Past Medical History:   Past Medical History:   Diagnosis Date     Esophageal reflux      Esophageal stricture 10/07/2020    benign esophageal stricture dilated at [facility]     History of 2019 novel coronavirus disease (COVID-19) 04/30/2020    covid 19 infection (POS pCR) ; no sequelae     Hyperlipidemia LDL goal <130 10/09/2009     Obesity 10/21/2009       Social History:   Social History     Tobacco Use     Smoking status: Never Smoker     Smokeless tobacco: Never Used   Substance Use Topics     Alcohol use: Not Currently       Family History:   Family History   Problem Relation Age of Onset     Hypertension Mother      Cancer Mother         cervical cancer     Depression Mother      Thyroid Disease Mother      Diabetes Father      Hypertension Father      Cerebrovascular Disease Father      Depression Father      Heart Disease Father      Lipids Father      Colon Cancer Father      Colon Cancer Maternal Grandmother      Colon Cancer Maternal Uncle        Allergies:   Allergies   Allergen Reactions     Seasonal Allergies        Active Medications:   Current Outpatient Medications   Medication Sig Dispense Refill     fluticasone (FLONASE) 50 MCG/ACT nasal spray Spray 2 sprays into both nostrils 2 times daily 11.1 mL 3      "omeprazole (PRILOSEC) 20 MG DR capsule TAKE 1 CAPSULE(20 MG) BY MOUTH DAILY 90 capsule 2       Systemic Review:   CONSTITUTIONAL: NEGATIVE for fever, chills, change in weight  ENT/MOUTH: NEGATIVE for ear, mouth and throat problems  RESP: NEGATIVE for significant cough or SOB  CV: NEGATIVE for chest pain, palpitations or peripheral edema    Physical Examination:   Vital Signs: BP (!) 143/94 (BP Location: Right arm)   Pulse 64   Temp 97.7  F (36.5  C) (Temporal)   Resp 16   Ht 1.689 m (5' 6.5\")   Wt 102.1 kg (225 lb)   SpO2 100%   BMI 35.77 kg/m    GENERAL: healthy, alert and no distress  NECK: no adenopathy, no asymmetry, masses, or scars  RESP: lungs clear to auscultation - no rales, rhonchi or wheezes  CV: regular rate and rhythm, normal S1 S2, no S3 or S4, no murmur, click or rub, no peripheral edema and peripheral pulses strong  ABDOMEN: soft, nontender, no hepatosplenomegaly, no masses and bowel sounds normal  MS: no gross musculoskeletal defects noted, no edema    Plan: Appropriate to proceed as scheduled.      Charlie Vuong DO  8/18/2022    PCP:  [facility]    "

## 2022-08-18 NOTE — ANESTHESIA CARE TRANSFER NOTE
Patient: Isacc Wang Jr.    Procedure: Procedure(s):  ESOPHAGOGASTRODUODENOSCOPY, WITH BRAVO PH MONITORING DEVICE INSERTION       Diagnosis: Gastroesophageal reflux disease with esophagitis, unspecified whether hemorrhage [K21.00]  Diagnosis Additional Information: No value filed.    Anesthesia Type:   MAC     Note:    Oropharynx: oropharynx clear of all foreign objects and spontaneously breathing  Level of Consciousness: awake and drowsy  Oxygen Supplementation: room air    Independent Airway: airway patency satisfactory and stable  Dentition: dentition unchanged  Vital Signs Stable: post-procedure vital signs reviewed and stable  Report to RN Given: handoff report given  Patient transferred to: Phase II    Handoff Report: Identifed the Patient, Identified the Reponsible Provider, Reviewed the pertinent medical history, Discussed the surgical course, Reviewed Intra-OP anesthesia mangement and issues during anesthesia, Set expectations for post-procedure period and Allowed opportunity for questions and acknowledgement of understanding      Vitals:  Vitals Value Taken Time   /78    Temp     Pulse 66    Resp     SpO2 96        Electronically Signed By: BRANDON Heck CRNA  August 18, 2022  9:07 AM

## 2022-08-18 NOTE — ANESTHESIA PREPROCEDURE EVALUATION
Anesthesia Pre-Procedure Evaluation    Patient: Isacc Wang Jr.   MRN: [id number] : 1980        Procedure : Procedure(s):  ESOPHAGOGASTRODUODENOSCOPY, WITH BRAVO PH MONITORING DEVICE INSERTION          Past Medical History:   Diagnosis Date     Esophageal reflux      Esophageal stricture 10/07/2020    benign esophageal stricture dilated at [facility]     History of 2019 novel coronavirus disease (COVID-19) 2020    covid 19 infection (POS pCR) ; no sequelae     Hyperlipidemia LDL goal <130 10/09/2009     Obesity 10/21/2009      Past Surgical History:   Procedure Laterality Date     COLONOSCOPY WITH CO2 INSUFFLATION N/A 10/26/2017    Procedure: COLONOSCOPY WITH CO2 INSUFFLATION;  COLON SCREEN/ FAMILY HX/ SPECIAL SCREENING FOR MALIGNANT NEOPLASMS/ ZOWNIROWYCZ;  Surgeon: Alfonso Ortega MD;  Location:  OR     [facility] NONSPECIFIC PROCEDURE      cyst removed right ear      Allergies   Allergen Reactions     Seasonal Allergies       Social History     Tobacco Use     Smoking status: Never Smoker     Smokeless tobacco: Never Used   Substance Use Topics     Alcohol use: Not Currently      Wt Readings from Last 1 Encounters:   22 102.1 kg (225 lb)        Anesthesia Evaluation            ROS/MED HX  ENT/Pulmonary:       Neurologic:       Cardiovascular:       METS/Exercise Tolerance:     Hematologic:       Musculoskeletal:       GI/Hepatic:     (+) GERD,     Renal/Genitourinary:       Endo:     (+) Obesity,     Psychiatric/Substance Use:       Infectious Disease:       Malignancy:       Other:            Physical Exam    Airway        Mallampati: II   TM distance: > 3 FB      Respiratory Devices and Support         Dental           Cardiovascular          Rhythm and rate: regular     Pulmonary           breath sounds clear to auscultation           OUTSIDE LABS:  CBC:   Lab Results   Component Value Date    WBC 8.5 2020    WBC 9.2 2020    HGB 14.1 2020    HGB 14.9 2020    HCT  41.3 11/20/2020    HCT 43.7 08/27/2020     11/20/2020     08/27/2020     BMP:   Lab Results   Component Value Date     10/05/2021     11/20/2020    POTASSIUM 4.1 10/05/2021    POTASSIUM 4.1 11/20/2020    CHLORIDE 108 10/05/2021    CHLORIDE 105 11/20/2020    CO2 30 10/05/2021    CO2 28 11/20/2020    BUN 12 10/05/2021    BUN 13 11/20/2020    CR 0.69 10/05/2021    CR 0.60 (L) 11/20/2020    GLC 91 02/04/2022     (H) 10/05/2021     COAGS: No results found for: PTT, INR, FIBR  POC: No results found for: BGM, HCG, HCGS  HEPATIC:   Lab Results   Component Value Date    ALBUMIN 3.7 10/05/2021    PROTTOTAL 7.8 10/05/2021    ALT 31 10/05/2021    AST 14 10/05/2021    ALKPHOS 64 10/05/2021    BILITOTAL 0.2 10/05/2021     OTHER:   Lab Results   Component Value Date    A1C 5.6 02/04/2022    MILLY 9.1 10/05/2021    MAG 2.0 11/20/2020    LIPASE 74 10/05/2021    AMYLASE 62 10/05/2021    TSH 0.98 11/20/2020    CRP <2.9 08/27/2020    SED 5 01/10/2011       Anesthesia Plan    ASA Status:  2   NPO Status:  NPO Appropriate    Anesthesia Type: MAC.              Consents    Anesthesia Plan(s) and associated risks, benefits, and realistic alternatives discussed. Questions answered and patient/representative(s) expressed understanding.    - Discussed:     - Discussed with:  Patient         Postoperative Care            Comments:                Grabiel Hamilton MD

## 2022-08-18 NOTE — DISCHARGE INSTRUCTIONS
Instructions for Your Upper Endoscopy with Bravo Capsule Placement    After the procedure  You may burp up air left in your stomach.  You may feel drowsy or a little dizzy from the medicine.   Your throat may feel numb. One hour after the exam, swallow a small amount of cool water. If you can swallow easily, you may go back to your regular diet and medicines.  Your throat may be sore for the rest of the day. Throat lozenges or ice chips may help.  Do not drive for 24 hours.    Bravo Capsule Instructions  The capsule placed in your food pipe will record acid levels over 96 hours. You may do your usual activities during this time. To help get clear results, follow these directions:  Do not take aspirin or anti-inflammatory pain medicines (ibuprofen, naproxin, Motrin, Advil, Nuprin, Aleve)  Do not take acid reducers (Aciphex, Axid, cimetidine, esomeprazole, famotidine, Losec, metoclopramide, Nexium, nizatidine, omeprazole, pantoprazole, Pepcid, Prilosec, Propulsid, Protonix, rabeprazole, ranitidine, Reglan, Tagamet and Zantac).  Eat no more than 3 to 4 meals a day (no gum, hard candy or snacking in between meals). Stay upright for at least 2 hours after dinner, if you can.  Refer to the time shown on your recorder, not your clock or watch.  If the  beeps, you may be more than 3 feet away from the  or near other electronics. Hold it to your chest for 30 seconds to reset it. You will hear a beep and the light will flash for 3 seconds to let you know the event was recorded.  If the recorder is on standby (backlight is off), press any button to turn it back on. (Nothing will be recorded.) Next, press the desired button to record an event.    How to record  Meals: Press the Meal button on your recorder at the start of a meal; it will stay lit. Press it again at the end of the meal to turn it off. Write the start and end times in your diary.  Beverages (other than water): Press the Meal button. Finish  drinking within 25 minutes. Write the start and end times in your diary.  Lying down and getting up: Press the Supine button when you lie down; it will stay lit. Press it again when you get up (even if just getting up to use the bathroom). The light will turn off. Write the start and end times in your diary.  Symptoms: Press the Symptom button, and write the time in your diary. If symptoms lasts longer than 15 minutes, press the button again.  Symptoms may include:  Belly pain, chest pain or heartburn  Coughing or wheezing  Burping or bringing swallowed food back up into your mouth,  Feeling sick to your stomach or throwing up  Trouble swallowing.  Medicines: Log any medicine times in your diary. (No need to press a button.) Take Acetaminophen (Tylenol) for discomfort. Do not take aspirin, anti-inflammatories (ibuprofen) or antacids. Ask your pharmacist if you have questions.    Caring for the recorder  Please keep the unit in its case at all times. Handle it with care.  Do not drop it  Do not get it wet. You may take a shower or tub bath, but leave the recorder on a counter nearby within 3 feet.  Never turn off the monitor or disconnect it. It will turn off by itself after 96 hours.  If you take the recorder off while sleeping, place it on a stable surface near your chest and within an arm's reach.    After the test is complete  The capsule will pass naturally out of your body within 7 to 10 days with a bowel movement.  The capsule contains a small magnet. Do not have an MRI test for 30 days. An MRI could harm you if the capsule has not passed out of your body.  Bring the recorder and diary back to [facility] at [address] [place] [id number]. 3rd Floor Non-invasive GI Procedures.  Do not send in the mail.    Call us at once if you have:  Unusual pain or problems swallowing, unusual stomach or chest pain.  Vomit that looks like coffee grounds or black or bloody stools (bowel movements).  A  fever above 100.6  F (37.5  C) when taken under the tongue.    Test results  You will receive your results in 7 to 10 business days by phone, letter or MyChart.    For questions or appointments, call:  [facility]: [phone], option 2  Monday through Friday, 8 a.m. to 4:30 p.m.  (If it is after hours, call [phone]. Ask for the GI fellow resident on call.)

## 2022-08-23 ENCOUNTER — ALLIED HEALTH/NURSE VISIT (OUTPATIENT)
Dept: GASTROENTEROLOGY | Facility: CLINIC | Age: 42
End: 2022-08-23
Payer: COMMERCIAL

## 2022-08-23 DIAGNOSIS — K21.00 GASTROESOPHAGEAL REFLUX DISEASE WITH ESOPHAGITIS: Primary | ICD-10-CM

## 2022-08-23 PROCEDURE — 99207 PR GASTROESOPH REFLUX W MUCOSAL PH ELECTRODE: CPT

## 2022-08-23 NOTE — PROGRESS NOTES
Bravo monitor and diary returned.  Data uploaded and sent to reading provider for interpretation.  Reading Provider: Dr Vuong

## 2022-10-06 ENCOUNTER — DOCUMENTATION ONLY (OUTPATIENT)
Dept: OTOLARYNGOLOGY | Facility: CLINIC | Age: 42
End: 2022-10-06

## 2022-10-06 NOTE — PROGRESS NOTES
PH testing 8/23/22    The overall % acid exposure of 13.6% and [place] overall of 51.7 was abnormal indicating ongoing acid reflux.  There were 11 occurrences of heartburn, and 6 of regurgitation symptoms. SI was 9.1 (1/11) and 33.3 (2/6) respectively overall for acid reflux which indicates a less than complete correlation. The SAP was 74.4 and 70.9 respectively indicating a less than complete correlation. Interpret within clinical context.     EGD 8/18/22   Normal

## 2022-11-19 ENCOUNTER — HEALTH MAINTENANCE LETTER (OUTPATIENT)
Age: 42
End: 2022-11-19

## 2023-02-10 ENCOUNTER — OFFICE VISIT (OUTPATIENT)
Dept: FAMILY MEDICINE | Facility: CLINIC | Age: 43
End: 2023-02-10
Payer: COMMERCIAL

## 2023-02-10 VITALS
BODY MASS INDEX: 38.74 KG/M2 | DIASTOLIC BLOOD PRESSURE: 84 MMHG | TEMPERATURE: 98.6 F | RESPIRATION RATE: 18 BRPM | HEIGHT: 67 IN | WEIGHT: 246.8 LBS | HEART RATE: 70 BPM | OXYGEN SATURATION: 98 % | SYSTOLIC BLOOD PRESSURE: 138 MMHG

## 2023-02-10 DIAGNOSIS — Z12.11 SCREEN FOR COLON CANCER: ICD-10-CM

## 2023-02-10 DIAGNOSIS — E66.01 MORBID OBESITY (H): ICD-10-CM

## 2023-02-10 DIAGNOSIS — Z00.00 ROUTINE GENERAL MEDICAL EXAMINATION AT A HEALTH CARE FACILITY: Primary | ICD-10-CM

## 2023-02-10 DIAGNOSIS — K21.9 GASTROESOPHAGEAL REFLUX DISEASE WITHOUT ESOPHAGITIS: ICD-10-CM

## 2023-02-10 LAB
ANION GAP SERPL CALCULATED.3IONS-SCNC: 4 MMOL/L (ref 3–14)
BUN SERPL-MCNC: 13 MG/DL (ref 7–30)
CALCIUM SERPL-MCNC: 9.6 MG/DL (ref 8.5–10.1)
CHLORIDE BLD-SCNC: 109 MMOL/L (ref 94–109)
CO2 SERPL-SCNC: 28 MMOL/L (ref 20–32)
CREAT SERPL-MCNC: 0.68 MG/DL (ref 0.66–1.25)
GFR SERPL CREATININE-BSD FRML MDRD: >90 ML/MIN/1.73M2
GLUCOSE BLD-MCNC: 96 MG/DL (ref 70–99)
HBA1C MFR BLD: 5.7 % (ref 0–5.6)
POTASSIUM BLD-SCNC: 4.3 MMOL/L (ref 3.4–5.3)
PSA SERPL-MCNC: 0.95 UG/L (ref 0–4)
SODIUM SERPL-SCNC: 141 MMOL/L (ref 133–144)

## 2023-02-10 PROCEDURE — G0103 PSA SCREENING: HCPCS | Performed by: STUDENT IN AN ORGANIZED HEALTH CARE EDUCATION/TRAINING PROGRAM

## 2023-02-10 PROCEDURE — 99213 OFFICE O/P EST LOW 20 MIN: CPT | Mod: 25 | Performed by: STUDENT IN AN ORGANIZED HEALTH CARE EDUCATION/TRAINING PROGRAM

## 2023-02-10 PROCEDURE — 99396 PREV VISIT EST AGE 40-64: CPT | Performed by: STUDENT IN AN ORGANIZED HEALTH CARE EDUCATION/TRAINING PROGRAM

## 2023-02-10 PROCEDURE — 82306 VITAMIN D 25 HYDROXY: CPT | Performed by: STUDENT IN AN ORGANIZED HEALTH CARE EDUCATION/TRAINING PROGRAM

## 2023-02-10 PROCEDURE — 83036 HEMOGLOBIN GLYCOSYLATED A1C: CPT | Performed by: STUDENT IN AN ORGANIZED HEALTH CARE EDUCATION/TRAINING PROGRAM

## 2023-02-10 PROCEDURE — 36415 COLL VENOUS BLD VENIPUNCTURE: CPT | Performed by: STUDENT IN AN ORGANIZED HEALTH CARE EDUCATION/TRAINING PROGRAM

## 2023-02-10 PROCEDURE — 80048 BASIC METABOLIC PNL TOTAL CA: CPT | Performed by: STUDENT IN AN ORGANIZED HEALTH CARE EDUCATION/TRAINING PROGRAM

## 2023-02-10 RX ORDER — PANTOPRAZOLE SODIUM 20 MG/1
20 TABLET, DELAYED RELEASE ORAL DAILY
Qty: 90 TABLET | Refills: 1 | Status: SHIPPED | OUTPATIENT
Start: 2023-02-10 | End: 2023-05-23

## 2023-02-10 RX ORDER — ONDANSETRON 4 MG/1
4 TABLET, ORALLY DISINTEGRATING ORAL EVERY 8 HOURS PRN
Qty: 90 TABLET | Refills: 1 | Status: SHIPPED | OUTPATIENT
Start: 2023-02-10 | End: 2024-05-07

## 2023-02-10 ASSESSMENT — ENCOUNTER SYMPTOMS
ARTHRALGIAS: 0
SORE THROAT: 0
PARESTHESIAS: 0
DIZZINESS: 0
CHILLS: 0
COUGH: 0
HEMATURIA: 0
DIARRHEA: 0
PALPITATIONS: 0
FEVER: 0
WEAKNESS: 0
MYALGIAS: 0
NERVOUS/ANXIOUS: 0
SHORTNESS OF BREATH: 0
HEMATOCHEZIA: 0
EYE PAIN: 0
CONSTIPATION: 0
FREQUENCY: 0
ABDOMINAL PAIN: 0
HEADACHES: 0
HEARTBURN: 0
DYSURIA: 0
NAUSEA: 0
JOINT SWELLING: 0

## 2023-02-10 NOTE — PROGRESS NOTES
SUBJECTIVE:   CC: Isacc is an 42 year old who presents for preventative health visit.   Patient has been advised of split billing requirements and indicates understanding: Yes  Healthy Habits:     Getting at least 3 servings of Calcium per day:  Yes    Bi-annual eye exam:  Yes    Dental care twice a year:  Yes    Sleep apnea or symptoms of sleep apnea:  None    Diet:  Other    Frequency of exercise:  2-3 days/week    Duration of exercise:  30-45 minutes    Taking medications regularly:  Yes    Medication side effects:  None    PHQ-2 Total Score: 0    Additional concerns today:  No    Patient reports that he has suffered with GI issues and concerns after having COVID last year.  Reports being seen by provider here in office who provided him a referral to go see gastroenterology.  At that time gastroenterology did recommend a capsule EGD along with being seen by ENT for possible esophagitis.  Patient reports that he feels unheard and seen from the care he received due to not having complete follow-up after the last procedure was performed.  He reports calling the office of the ENT specialist in October 2020 to regarding a device that was given regarding monitoring his eating however no one returns call.  Patient states that he still intermittently suffers from acid reflux and reports using omeprazole only as needed secondary to side effect concerns.  Patient states that he is interested in having more of the regimen.  Patient endorses a family history of stomach cancer with grandfather dying of stomach cancer mother currently in chemotherapy for stomach cancer.    Patient states he has been working on his weight and states that he does note a 10 pound increase since last seen.  He reports exercising with his sons and a follow-up basketball as well as weightlifting.  He states that he has tried intermittent fasting however does consume coffee with creamer.  Patient states that he is wanting to work hard in improving  his overall weight.          Today's PHQ-2 Score:   PHQ-2 ( 1999 Pf[employer]) 2/10/2023   Q1: Little interest or pleasure in doing things 0   Q2: Feeling down, depressed or hopeless 0   PHQ-2 Score 0   PHQ-2 Total Score (12-17 Years)- Positive if 3 or more points; Administer PHQ-A if positive -   Q1: Little interest or pleasure in doing things Not at all   Q2: Feeling down, depressed or hopeless Not at all   PHQ-2 Score 0       Have you ever done Advance Care Planning? (For example, a Health Directive, POLST, or a discussion with a medical provider or your loved ones about your wishes): No, advance care planning information given to patient to review.  Patient plans to discuss their wishes with loved ones or provider.      Social History     Tobacco Use     Smoking status: Never     Smokeless tobacco: Never   Substance Use Topics     Alcohol use: Not Currently         Alcohol Use 2/10/2023   Prescreen: >3 drinks/day or >7 drinks/week? No   Prescreen: >3 drinks/day or >7 drinks/week? -       Last PSA:   PSA   Date Value Ref Range Status   05/21/2019 0.97 0 - 4 ug/L Final     Comment:     Assay Method:  Chemiluminescence using Siemens Vista analyzer       Reviewed orders with patient. Reviewed health maintenance and updated orders accordingly - Yes  Lab work is in process  Labs reviewed in EPIC    Reviewed and updated as needed this visit by clinical staff   Tobacco  Allergies  Meds              Reviewed and updated as needed this visit by Provider                     Review of Systems   Constitutional: Negative for chills and fever.   HENT: Negative for congestion, ear pain, hearing loss and sore throat.    Eyes: Negative for pain and visual disturbance.   Respiratory: Negative for cough and shortness of breath.    Cardiovascular: Negative for chest pain, palpitations and peripheral edema.   Gastrointestinal: Negative for abdominal pain, constipation, diarrhea, heartburn, hematochezia and nausea.   Genitourinary:  "Positive for impotence. Negative for dysuria, frequency, genital sores, hematuria, penile discharge and urgency.   Musculoskeletal: Negative for arthralgias, joint swelling and myalgias.   Skin: Negative for rash.   Neurological: Negative for dizziness, weakness, headaches and paresthesias.   Psychiatric/Behavioral: Negative for mood changes. The patient is not nervous/anxious.          OBJECTIVE:   /84   Pulse 70   Temp 98.6  F (37  C) (Temporal)   Resp 18   Ht 1.689 m (5' 6.5\")   Wt 111.9 kg (246 lb 12.8 oz)   SpO2 98%   BMI 39.24 kg/m      Physical Exam  GENERAL: healthy, alert and no distress  EYES: Eyes grossly normal to inspection, PERRL and conjunctivae and sclerae normal  HENT: ear canals and TM's normal, nose and mouth without ulcers or lesions  NECK: no adenopathy, no asymmetry, masses, or scars and thyroid normal to palpation  RESP: lungs clear to auscultation - no rales, rhonchi or wheezes  CV: regular rate and rhythm, normal S1 S2, no S3 or S4, no murmur, click or rub, no peripheral edema and peripheral pulses strong  ABDOMEN: soft, nontender, no hepatosplenomegaly, no masses and bowel sounds normal  MS: no gross musculoskeletal defects noted, no edema  SKIN: no suspicious lesions or rashes  NEURO: Normal strength and tone, mentation intact and speech normal  PSYCH: mentation appears normal, affect normal/bright    Diagnostic Test Results:  Labs reviewed in Epic  Results for orders placed or performed in visit on 02/10/23   Hemoglobin A1c     Status: Abnormal   Result Value Ref Range    Hemoglobin A1C 5.7 (H) 0.0 - 5.6 %       ASSESSMENT/PLAN:   (Z00.00) Routine general medical examination at a health care facility  (primary encounter diagnosis)  Comment: Stable  Plan: REVIEW OF HEALTH MAINTENANCE PROTOCOL ORDERS,         PSA, screen, Hemoglobin A1c, Basic metabolic         panel  (Ca, Cl, CO2, Creat, Gluc, K, Na, BUN),         Vitamin D deficiency screening            (Z12.11) Screen for " "colon cancer  Comment: Stable.   Plan: Colonoscopy Screening  Referral            (K21.9) Gastroesophageal reflux disease without esophagitis  Comment: Chronic, uncontrolled.  Patient counseled on the importance of use of consistent PPIs with its use in the ability to decrease acid production.  Counseling provided on use of pantoprazole daily to improve symptoms and patient verbalized that he is interested in trying.  Will send prescription to the pharmacy as well as Zofran to help with any associated nausea  Plan: pantoprazole (PROTONIX) 20 MG EC tablet,         ondansetron (ZOFRAN ODT) 4 MG ODT tab            (E66.01) Morbid obesity (H)  Comment: Chronic, uncontrolled.  Diet and exercise counseling provided during visit.  Electronic and specific BMI with present chills completed during visit to use at baseline.  Patient with notable abdominal fat 138 pounds with 57% fat mass.  BMI according scale noted to be 38.9.  Plan: Patient to follow-up in several months to repeat weigh in     Patient has been advised of split billing requirements and indicates understanding: Yes      COUNSELING:   Reviewed preventive health counseling, as reflected in patient instructions       Regular exercise       Healthy diet/nutrition       Colorectal cancer screening       Prostate cancer screening       Advance Care Planning      BMI:   Estimated body mass index is 39.24 kg/m  as calculated from the following:    Height as of this encounter: 1.689 m (5' 6.5\").    Weight as of this encounter: 111.9 kg (246 lb 12.8 oz).   Weight management plan: Discussed healthy diet and exercise guidelines      He reports that he has never smoked. He has never used smokeless tobacco.            YASMANY FARIAS MD  [facility]  "

## 2023-02-11 LAB — DEPRECATED CALCIDIOL+CALCIFEROL SERPL-MC: 10 UG/L (ref 20–75)

## 2023-02-13 DIAGNOSIS — E55.9 VITAMIN D DEFICIENCY: Primary | ICD-10-CM

## 2023-02-13 RX ORDER — ERGOCALCIFEROL 1.25 MG/1
50000 CAPSULE, LIQUID FILLED ORAL WEEKLY
Qty: 15 CAPSULE | Refills: 2 | Status: SHIPPED | OUTPATIENT
Start: 2023-02-13 | End: 2024-05-07

## 2023-04-14 ENCOUNTER — TELEPHONE (OUTPATIENT)
Dept: GASTROENTEROLOGY | Facility: CLINIC | Age: 43
End: 2023-04-14
Payer: COMMERCIAL

## 2023-04-14 NOTE — TELEPHONE ENCOUNTER
Screening Questions  BLUE  KIND OF PREP RED  LOCATION [review exclusion criteria] GREEN  SEDATION TYPE        Y & LETTER Are you active on mychart?       Libby Alan MD   Ordering/Referring Provider?        [employer] What type of coverage do you have?      N Have you had a positive covid test in the last 14 days?     37.9 1. BMI  [BMI 40+ - review exclusion criteria]    Y  2. Are you able to give consent for your medical care? [IF NO,RN REVIEW]          N  3. Are you taking any prescription pain medications on a routine schedule   (ex narcotics: oxycodone, roxicodone, oxycontin,  and percocet)? [RN Review]          3a. EXTENDED PREP What kind of prescription?     N 4. Do you have any chemical dependencies such as alcohol, street drugs, or methadone?        **If yes 3- 5 , please schedule with MAC sedation.**          IF YES TO ANY 6 - 10 - HOSPITAL SETTING ONLY.     N 6.   Do you need assistance transferring?     N 7.   Have you had a heart or lung transplant?    N 8.   Are you currently on dialysis?   N 9.   Do you use daily home oxygen?   N 10. Do you take nitroglycerin?   10a.  If yes, how often?     11. [FEMALES]  N/A Are you currently pregnant?    11a.  If yes, how many weeks? [ Greater than 12 weeks, OR NEEDED]    N 12. Do you have Pulmonary Hypertension? *NEED PAC APPT AT [facility] w/ MAC*     N 13. [review exclusion criteria]  Do you have any implantable devices in your body (pacemaker, defib, LVAD)?    N 14. In the past 6 months, have you had any heart related issues including cardiomyopathy or heart attack?     14a.  If yes, did it require cardiac stenting if so when?     N 15. Have you had a stroke or Transient ischemic attack (TIA - aka  mini stroke ) within 6 months?      N 16. Do you have mod to severe Obstructive Sleep Apnea?  [Hospital only]    N 17. Do you have SEVERE AND UNCONTROLLED asthma? *NEED PAC APPT AT [facility] w/MAC*     18. Are you currently taking any blood thinners?     18a. No. Continue  "to 19.   18b. Yes/no Blood Thinner: No [CONTINUE TO #19]    N 19. Do you take the medication Phentermine?    19a. If yes, \"Hold for 7 days before procedure.  Please consult your prescribing provider if you have questions about holding this medication.\"     N  20. Do you have chronic kidney disease?      N  21. Do you have a diagnosis of diabetes?     N  22. On a regular basis do you go 3-5 days between bowel movements?      23. Preferred LOCAL Pharmacy for Pre Prescription    [ LIST ONLY ONE PHARMACY]        [id number] - [address] [place] [facility] [place] [address]      - CLOSING REMINDERS -    Informed patient they will need an adult    Cannot take any type of public or medical transportation alone    Conscious Sedation- Needs  for 6 hours after the procedure       MAC/General-Needs  for 24 hours after procedure    Pre-Procedure Covid test to be completed [[facility] PCR Testing Required]    Confirmed Nurse will call to complete assessment       - SCHEDULING DETAILS -  NO Hospital Setting Required? If yes, what is the exclusion?:    RUTH  Surgeon    6/15  Date of Procedure  Lower Endoscopy [Colonoscopy]  Type of Procedure Scheduled  [facility] Location   MIRALAX GATORADE WITHOUT MAGNEISUM CITRATE Which Colonoscopy Prep was Sent?     MODERATE Sedation Type     N PAC / Pre-op Required                 "

## 2023-05-23 ENCOUNTER — OFFICE VISIT (OUTPATIENT)
Dept: FAMILY MEDICINE | Facility: CLINIC | Age: 43
End: 2023-05-23
Payer: COMMERCIAL

## 2023-05-23 VITALS
SYSTOLIC BLOOD PRESSURE: 114 MMHG | DIASTOLIC BLOOD PRESSURE: 74 MMHG | HEART RATE: 94 BPM | WEIGHT: 239.5 LBS | RESPIRATION RATE: 20 BRPM | HEIGHT: 67 IN | TEMPERATURE: 98.6 F | OXYGEN SATURATION: 98 % | BODY MASS INDEX: 37.59 KG/M2

## 2023-05-23 DIAGNOSIS — Z01.818 PREOPERATIVE EXAMINATION: Primary | ICD-10-CM

## 2023-05-23 DIAGNOSIS — K21.9 GASTROESOPHAGEAL REFLUX DISEASE WITHOUT ESOPHAGITIS: ICD-10-CM

## 2023-05-23 LAB
ALBUMIN SERPL BCG-MCNC: 4 G/DL (ref 3.5–5.2)
ALP SERPL-CCNC: 60 U/L (ref 40–129)
ALT SERPL W P-5'-P-CCNC: 28 U/L (ref 10–50)
ANION GAP SERPL CALCULATED.3IONS-SCNC: 10 MMOL/L (ref 7–15)
AST SERPL W P-5'-P-CCNC: 27 U/L (ref 10–50)
BASOPHILS # BLD AUTO: 0 10E3/UL (ref 0–0.2)
BASOPHILS NFR BLD AUTO: 0 %
BILIRUB SERPL-MCNC: <0.2 MG/DL
BUN SERPL-MCNC: 15.3 MG/DL (ref 6–20)
CALCIUM SERPL-MCNC: 9.5 MG/DL (ref 8.6–10)
CHLORIDE SERPL-SCNC: 103 MMOL/L (ref 98–107)
CREAT SERPL-MCNC: 0.73 MG/DL (ref 0.67–1.17)
DEPRECATED HCO3 PLAS-SCNC: 24 MMOL/L (ref 22–29)
EOSINOPHIL # BLD AUTO: 0.1 10E3/UL (ref 0–0.7)
EOSINOPHIL NFR BLD AUTO: 1 %
ERYTHROCYTE [DISTWIDTH] IN BLOOD BY AUTOMATED COUNT: 14.6 % (ref 10–15)
GFR SERPL CREATININE-BSD FRML MDRD: >90 ML/MIN/1.73M2
GLUCOSE SERPL-MCNC: 126 MG/DL (ref 70–99)
HCT VFR BLD AUTO: 40.7 % (ref 40–53)
HGB BLD-MCNC: 13.9 G/DL (ref 13.3–17.7)
LYMPHOCYTES # BLD AUTO: 2.2 10E3/UL (ref 0.8–5.3)
LYMPHOCYTES NFR BLD AUTO: 22 %
MCH RBC QN AUTO: 26.8 PG (ref 26.5–33)
MCHC RBC AUTO-ENTMCNC: 34.2 G/DL (ref 31.5–36.5)
MCV RBC AUTO: 78 FL (ref 78–100)
MONOCYTES # BLD AUTO: 0.7 10E3/UL (ref 0–1.3)
MONOCYTES NFR BLD AUTO: 7 %
NEUTROPHILS # BLD AUTO: 6.7 10E3/UL (ref 1.6–8.3)
NEUTROPHILS NFR BLD AUTO: 69 %
PLATELET # BLD AUTO: 266 10E3/UL (ref 150–450)
POTASSIUM SERPL-SCNC: 4.1 MMOL/L (ref 3.4–5.3)
PROT SERPL-MCNC: 7.3 G/DL (ref 6.4–8.3)
RBC # BLD AUTO: 5.19 10E6/UL (ref 4.4–5.9)
SODIUM SERPL-SCNC: 137 MMOL/L (ref 136–145)
WBC # BLD AUTO: 9.7 10E3/UL (ref 4–11)

## 2023-05-23 PROCEDURE — 93000 ELECTROCARDIOGRAM COMPLETE: CPT | Performed by: STUDENT IN AN ORGANIZED HEALTH CARE EDUCATION/TRAINING PROGRAM

## 2023-05-23 PROCEDURE — 85025 COMPLETE CBC W/AUTO DIFF WBC: CPT | Performed by: STUDENT IN AN ORGANIZED HEALTH CARE EDUCATION/TRAINING PROGRAM

## 2023-05-23 PROCEDURE — 99214 OFFICE O/P EST MOD 30 MIN: CPT | Performed by: STUDENT IN AN ORGANIZED HEALTH CARE EDUCATION/TRAINING PROGRAM

## 2023-05-23 PROCEDURE — 36415 COLL VENOUS BLD VENIPUNCTURE: CPT | Performed by: STUDENT IN AN ORGANIZED HEALTH CARE EDUCATION/TRAINING PROGRAM

## 2023-05-23 PROCEDURE — 80053 COMPREHEN METABOLIC PANEL: CPT | Performed by: STUDENT IN AN ORGANIZED HEALTH CARE EDUCATION/TRAINING PROGRAM

## 2023-05-23 NOTE — PROGRESS NOTES
[facility]  [address]  [place] [address]  Phone: [phone]  Primary Provider: Libby Farias  Pre-op Performing Provider: LIBBY FARIAS      PREOPERATIVE EVALUATION:  Today's date: 5/23/2023    Isacc Wang Jr. is a 42 year old male who presents for a preoperative evaluation.  Surgical Information:  Surgery/Procedure: Colonoscopy. Moderate Sedation.   Surgery Location:  GI  Surgeon: Andrea Tapia MD  Surgery Date: 6/15/23  Time of Surgery: 9:45 AM  Where patient plans to recover: At home with family  Fax number for surgical facility: Note does not need to be faxed, will be available electronically in Epic.    Assessment & Plan     The proposed surgical procedure is considered LOW risk.    (Z01.818) Preoperative examination  (primary encounter diagnosis)  Comment: Stable. Pending labs. EKG completed in clinic   Plan: EKG 12-lead complete w/read - Clinics,         Comprehensive metabolic panel (BMP + Alb, Alk         Phos, ALT, AST, Total. Bili, TP), CBC with         platelets and differential            (K21.9) Gastroesophageal reflux disease without esophagitis  Comment: Chronic, stable. Will send refill of medication  Plan: omeprazole (PRILOSEC) 20 MG DR capsule        Pending pickup of medications from pharmacy                - No identified additional risk factors other than previously addressed    Antiplatelet or Anticoagulation Medication Instructions:   - Patient is on no antiplatelet or anticoagulation medications.    Additional Medication Instructions:  Patient is to take all scheduled medications on the day of surgery    RECOMMENDATION:  APPROVAL GIVEN to proceed with proposed procedure, without further diagnostic evaluation.            Subjective       HPI related to upcoming procedure:  Colonoscopy. Moderate Sedation.         5/23/2023     2:27 PM   Preop Questions   1. Have you ever had a heart attack or stroke? No   2. Have you ever had surgery on  your heart or blood vessels, such as a stent placement, a coronary artery bypass, or surgery on an artery in your head, neck, heart, or legs? No   3. Do you have chest pain with activity? No   4. Do you have a history of  heart failure? No   5. Do you currently have a cold, bronchitis or symptoms of other infection? No   6. Do you have a cough, shortness of breath, or wheezing? No   7. Do you or anyone in your family have previous history of blood clots? No   8. Do you or does anyone in your family have a serious bleeding problem such as prolonged bleeding following surgeries or cuts? No   9. Have you ever had problems with anemia or been told to take iron pills? No   10. Have you had any abnormal blood loss such as black, tarry or bloody stools? No   11. Have you ever had a blood transfusion? No   12. Are you willing to have a blood transfusion if it is medically needed before, during, or after your surgery? NO -    13. Have you or any of your relatives ever had problems with anesthesia? No   14. Do you have sleep apnea, excessive snoring or daytime drowsiness? No   15. Do you have any artifical heart valves or other implanted medical devices like a pacemaker, defibrillator, or continuous glucose monitor? No   16. Do you have artificial joints? No   17. Are you allergic to latex? No       Health Care Directive:  Patient does not have a Health Care Directive or Living Will: Discussed advance care planning with patient; however, patient declined at this time.    Preoperative Review of :   reviewed - no record of controlled substances prescribed.      Status of Chronic Conditions:  See problem list for active medical problems.  Problems all longstanding and stable, except as noted/documented.  See ROS for pertinent symptoms related to these conditions.      Review of Systems  CONSTITUTIONAL: NEGATIVE for fever, chills, change in weight  INTEGUMENTARY/SKIN: NEGATIVE for worrisome rashes, moles or lesions  EYES:  NEGATIVE for vision changes or irritation  ENT/MOUTH: NEGATIVE for ear, mouth and throat problems  RESP: NEGATIVE for significant cough or SOB  CV: NEGATIVE for chest pain, palpitations or peripheral edema  GI: NEGATIVE for nausea, abdominal pain, heartburn, or change in bowel habits  : NEGATIVE for frequency, dysuria, or hematuria  MUSCULOSKELETAL: NEGATIVE for significant arthralgias or myalgia  NEURO: NEGATIVE for weakness, dizziness or paresthesias  ENDOCRINE: NEGATIVE for temperature intolerance, skin/hair changes  HEME: NEGATIVE for bleeding problems  PSYCHIATRIC: NEGATIVE for changes in mood or affect    Patient Active Problem List    Diagnosis Date Noted     Esophageal stricture 10/07/2020     Priority: Medium     benign esophageal stricture dilated at [facility]       Obesity (BMI 35.0-39.9) with comorbidity (H) 05/21/2019     Priority: Medium     12/9/2020: BMI 35.8       Family history of colon cancer 09/29/2017     Priority: Medium     Acute idiopathic gout of right foot 12/03/2015     Priority: Medium     Hyperlipidemia LDL goal <130 10/09/2009     Priority: Medium      Past Medical History:   Diagnosis Date     Esophageal reflux      Esophageal stricture 10/07/2020    benign esophageal stricture dilated at [facility]     History of 2019 novel coronavirus disease (COVID-19) 04/30/2020    covid 19 infection (POS pCR) ; no sequelae     Hyperlipidemia LDL goal <130 10/09/2009     Obesity 10/21/2009     Past Surgical History:   Procedure Laterality Date     COLONOSCOPY WITH CO2 INSUFFLATION N/A 10/26/2017    Procedure: COLONOSCOPY WITH CO2 INSUFFLATION;  COLON SCREEN/ FAMILY HX/ SPECIAL SCREENING FOR MALIGNANT NEOPLASMS/ ZOWNIROWYCZ;  Surgeon: Alfonso Ortega MD;  Location:  OR     [facility] NONSPECIFIC PROCEDURE      cyst removed right ear     Current Outpatient Medications   Medication Sig Dispense Refill     fluticasone (FLONASE) 50 MCG/ACT nasal spray Spray 2 sprays into both nostrils 2 times daily 11.1 mL  "3     ondansetron (ZOFRAN ODT) 4 MG ODT tab Take 1 tablet (4 mg) by mouth every 8 hours as needed for nausea or vomiting 90 tablet 1     pantoprazole (PROTONIX) 20 MG EC tablet Take 1 tablet (20 mg) by mouth daily (Patient not taking: Reported on 5/23/2023) 90 tablet 1     vitamin D2 (ERGOCALCIFEROL) 07473 units (1250 mcg) capsule Take 1 capsule (50,000 Units) by mouth once a week (Patient not taking: Reported on 5/23/2023) 15 capsule 2       Allergies   Allergen Reactions     Seasonal Allergies         Social History     Tobacco Use     Smoking status: Never     Smokeless tobacco: Never   Vaping Use     Vaping status: Never Used   Substance Use Topics     Alcohol use: Not Currently     Family History   Problem Relation Age of Onset     Hypertension Mother      Cancer Mother         cervical cancer     Depression Mother      Thyroid Disease Mother      Diabetes Father      Hypertension Father      Cerebrovascular Disease Father      Depression Father      Heart Disease Father      Lipids Father      Colon Cancer Father      Colon Cancer Maternal Grandmother      Colon Cancer Maternal Uncle      History   Drug Use No         Objective     /74   Pulse 94   Temp 98.6  F (37  C) (Temporal)   Resp 20   Ht 1.689 m (5' 6.5\")   Wt 108.6 kg (239 lb 8 oz)   SpO2 98%   BMI 38.08 kg/m      Physical Exam    GENERAL APPEARANCE: healthy, alert and no distress     EYES: EOMI,  PERRL     HENT: ear canals and TM's normal and nose and mouth without ulcers or lesions     NECK: no adenopathy, no asymmetry, masses, or scars and thyroid normal to palpation     RESP: lungs clear to auscultation - no rales, rhonchi or wheezes     CV: regular rates and rhythm, normal S1 S2, no S3 or S4 and no murmur, click or rub     ABDOMEN:  soft, nontender, no HSM or masses and bowel sounds normal     MS: extremities normal- no gross deformities noted, no evidence of inflammation in joints, FROM in all extremities.     SKIN: no suspicious " lesions or rashes     NEURO: Normal strength and tone, sensory exam grossly normal, mentation intact and speech normal     PSYCH: mentation appears normal. and affect normal/bright     LYMPHATICS: No cervical adenopathy    Recent Labs   Lab Test 02/10/23  0900 02/04/22  1139 10/05/21  1710     --  142   POTASSIUM 4.3  --  4.1   CR 0.68  --  0.69   A1C 5.7* 5.6  --         Diagnostics:  Labs pending at this time.  Results will be reviewed when available.   EKG: appears normal, NSR, normal axis, normal intervals, no acute ST/T changes c/w ischemia, low voltage in precordial leads, voltage criteria for LVH    Revised Cardiac Risk Index (RCRI):  The patient has the following serious cardiovascular risks for perioperative complications:   - No serious cardiac risks = 0 points     RCRI Interpretation: 0 points: Class I (very low risk - 0.4% complication rate)           Signed Electronically by: YASMANY FARIAS MD  Copy of this evaluation report is provided to requesting physician.

## 2023-06-15 ENCOUNTER — HOSPITAL ENCOUNTER (OUTPATIENT)
Facility: CLINIC | Age: 43
Discharge: HOME | End: 2023-06-15
Attending: STUDENT IN AN ORGANIZED HEALTH CARE EDUCATION/TRAINING PROGRAM | Admitting: STUDENT IN AN ORGANIZED HEALTH CARE EDUCATION/TRAINING PROGRAM
Payer: COMMERCIAL

## 2023-06-15 VITALS
DIASTOLIC BLOOD PRESSURE: 105 MMHG | HEART RATE: 85 BPM | SYSTOLIC BLOOD PRESSURE: 137 MMHG | BODY MASS INDEX: 36.88 KG/M2 | RESPIRATION RATE: 17 BRPM | HEIGHT: 67 IN | OXYGEN SATURATION: 99 % | WEIGHT: 235 LBS

## 2023-06-15 DIAGNOSIS — Z80.0 FAMILY HISTORY OF COLON CANCER: Primary | ICD-10-CM

## 2023-06-15 LAB — COLONOSCOPY: NORMAL

## 2023-06-15 PROCEDURE — 99153 MOD SED SAME PHYS/QHP EA: CPT | Performed by: STUDENT IN AN ORGANIZED HEALTH CARE EDUCATION/TRAINING PROGRAM

## 2023-06-15 PROCEDURE — G0500 MOD SEDAT ENDO SERVICE >5YRS: HCPCS | Performed by: STUDENT IN AN ORGANIZED HEALTH CARE EDUCATION/TRAINING PROGRAM

## 2023-06-15 PROCEDURE — G0105 COLORECTAL SCRN; HI RISK IND: HCPCS | Performed by: STUDENT IN AN ORGANIZED HEALTH CARE EDUCATION/TRAINING PROGRAM

## 2023-06-15 PROCEDURE — 250N000011 HC RX IP 250 OP 636: Performed by: STUDENT IN AN ORGANIZED HEALTH CARE EDUCATION/TRAINING PROGRAM

## 2023-06-15 PROCEDURE — 45378 DIAGNOSTIC COLONOSCOPY: CPT | Performed by: STUDENT IN AN ORGANIZED HEALTH CARE EDUCATION/TRAINING PROGRAM

## 2023-06-15 RX ORDER — NALOXONE HYDROCHLORIDE 0.4 MG/ML
0.4 INJECTION, SOLUTION INTRAMUSCULAR; INTRAVENOUS; SUBCUTANEOUS
Status: DISCONTINUED | OUTPATIENT
Start: 2023-06-15 | End: 2023-06-15 | Stop reason: HOSPADM

## 2023-06-15 RX ORDER — NALOXONE HYDROCHLORIDE 0.4 MG/ML
0.2 INJECTION, SOLUTION INTRAMUSCULAR; INTRAVENOUS; SUBCUTANEOUS
Status: DISCONTINUED | OUTPATIENT
Start: 2023-06-15 | End: 2023-06-15 | Stop reason: HOSPADM

## 2023-06-15 RX ORDER — FLUMAZENIL 0.1 MG/ML
0.2 INJECTION, SOLUTION INTRAVENOUS
Status: DISCONTINUED | OUTPATIENT
Start: 2023-06-15 | End: 2023-06-15 | Stop reason: HOSPADM

## 2023-06-15 RX ORDER — SIMETHICONE 40MG/0.6ML
133 SUSPENSION, DROPS(FINAL DOSAGE FORM)(ML) ORAL
Status: DISCONTINUED | OUTPATIENT
Start: 2023-06-15 | End: 2023-06-15 | Stop reason: HOSPADM

## 2023-06-15 RX ORDER — FENTANYL CITRATE 50 UG/ML
50-100 INJECTION, SOLUTION INTRAMUSCULAR; INTRAVENOUS EVERY 5 MIN PRN
Status: DISCONTINUED | OUTPATIENT
Start: 2023-06-15 | End: 2023-06-15 | Stop reason: HOSPADM

## 2023-06-15 RX ORDER — DIPHENHYDRAMINE HYDROCHLORIDE 50 MG/ML
25-50 INJECTION INTRAMUSCULAR; INTRAVENOUS
Status: DISCONTINUED | OUTPATIENT
Start: 2023-06-15 | End: 2023-06-15 | Stop reason: HOSPADM

## 2023-06-15 RX ORDER — ATROPINE SULFATE 0.1 MG/ML
1 INJECTION INTRAVENOUS
Status: DISCONTINUED | OUTPATIENT
Start: 2023-06-15 | End: 2023-06-15 | Stop reason: HOSPADM

## 2023-06-15 RX ORDER — ONDANSETRON 2 MG/ML
4 INJECTION INTRAMUSCULAR; INTRAVENOUS
Status: DISCONTINUED | OUTPATIENT
Start: 2023-06-15 | End: 2023-06-15 | Stop reason: HOSPADM

## 2023-06-15 RX ORDER — PROCHLORPERAZINE MALEATE 10 MG
10 TABLET ORAL EVERY 6 HOURS PRN
Status: DISCONTINUED | OUTPATIENT
Start: 2023-06-15 | End: 2023-06-15 | Stop reason: HOSPADM

## 2023-06-15 RX ORDER — ONDANSETRON 4 MG/1
4 TABLET, ORALLY DISINTEGRATING ORAL EVERY 6 HOURS PRN
Status: DISCONTINUED | OUTPATIENT
Start: 2023-06-15 | End: 2023-06-15 | Stop reason: HOSPADM

## 2023-06-15 RX ORDER — ONDANSETRON 2 MG/ML
4 INJECTION INTRAMUSCULAR; INTRAVENOUS EVERY 6 HOURS PRN
Status: DISCONTINUED | OUTPATIENT
Start: 2023-06-15 | End: 2023-06-15 | Stop reason: HOSPADM

## 2023-06-15 RX ORDER — LIDOCAINE 40 MG/G
CREAM TOPICAL
Status: DISCONTINUED | OUTPATIENT
Start: 2023-06-15 | End: 2023-06-15 | Stop reason: HOSPADM

## 2023-06-15 RX ORDER — BISACODYL 5 MG/1
TABLET, DELAYED RELEASE ORAL
Qty: 4 TABLET | Refills: 0 | Status: SHIPPED | OUTPATIENT
Start: 2023-06-15 | End: 2024-05-07

## 2023-06-15 RX ORDER — EPINEPHRINE 1 MG/ML
0.1 INJECTION, SOLUTION INTRAMUSCULAR; SUBCUTANEOUS
Status: DISCONTINUED | OUTPATIENT
Start: 2023-06-15 | End: 2023-06-15 | Stop reason: HOSPADM

## 2023-06-15 RX ADMIN — MIDAZOLAM 2 MG: 1 INJECTION INTRAMUSCULAR; INTRAVENOUS at 09:54

## 2023-06-15 RX ADMIN — FENTANYL CITRATE 100 MCG: 50 INJECTION, SOLUTION INTRAMUSCULAR; INTRAVENOUS at 09:55

## 2023-06-15 RX ADMIN — FENTANYL CITRATE 50 MCG: 50 INJECTION, SOLUTION INTRAMUSCULAR; INTRAVENOUS at 10:15

## 2023-06-15 ASSESSMENT — ACTIVITIES OF DAILY LIVING (ADL)
ADLS_ACUITY_SCORE: 35
ADLS_ACUITY_SCORE: 35

## 2023-06-15 NOTE — DISCHARGE INSTRUCTIONS
STANDARD Golytely (Colyte, Nulytely)  Prep Instructions for your Colonoscopy      Please read these instructions carefully at least 7 days prior to your colonoscopy procedure. Be sure to follow all directions completely. The inside of your colon must be clean to allow for a complete examination for the presence of any growths, polyps, and/or abnormalities, as well as their biopsy or removal. A number of tips are included in order to make this part of the procedure as comfortable as possible.    Getting ready:   You will receive a call from a Nurse to review instructions and health history.  This assessment must be completed prior to your procedure.  Failure to complete the Nurse assessment may result in the procedure being cancelled.  You must arrange for an adult to drive you home after your exam. Your colonoscopy cannot be done unless you have a ride. If you need to use public transportation, someone must ride with you and stay with you for a minimum of 6-24 hours.  Check with your insurance company to be sure they will cover this exam.    7 days before the exam:  Talk to your prescribing provider: If you take blood thinners (such as Coumadin, Plavix, Xarelto, Eliquis, Lovenox, or others), these medications may need to be stopped temporarily before your procedure. Your prescribing provider will tell you what to do.   Talk to your prescribing provider: If you take prescription NSAIDS (such as Sulindac, Celebrex, Mobic, Relafen). Your prescribing provider will tell you what to do.   Stop taking fiber supplements (bran, Metamucil, Fibercon), multi-vitamins with iron, and medicines that contain iron.  Continue taking prescribed aspirin; talk to your prescribing doctor with any concerns.  Stop eating corn, nuts and foods with seeds.  These can stay in the colon for days.  If you have diabetes:  Ask to have your exam early in the morning.  Also, ask your doctor if you should change your diet or medicines.    3 days  before the exam:  Begin a low-fiber diet: No raw fruits or vegetables, whole wheat, seeds, nuts, popcorn or other high-fiber foods (see list below). No Olestra (a fat substitute).    One day before the exam:  You can have a light, low-fiber breakfast. But drink only clear liquids after 9 a.m. (see list below). Drink at least 8 to 10 full glasses of clear liquids during the day.   Stop taking NSAID pain relievers, such as Advil, Ibuprofen, Motrin, etc.  You may take Tylenol.  Fill the jug that contains the Golytely powder with warm water. Cover and shake until well mixed. Use a full gallon of water. Chill for 3 hours, but do not add ice.  You will start drinking half of the Golytely solution at 6 p.m. The timing of drinking the 2nd half of the Golytely solution depends on your exam time. See Step 2 below.  After you start drinking the solution, stay near a toilet. You may have watery stools (diarrhea), mild cramping, bloating , and nausea.     Step One:  At 3 p.m., take 2 tablets of Dulcolax (bisacodyl).  At 6 p.m. start drinking the Golytely solution. Drink an 8-ounce glass every 15 minutes until the jug is half empty. Drink each glass quickly.     Step Two:   If you arrive before 11 AM:  At 11 p.m. on the day before your exam:  Take 2 Dulcolax (Bisacodyl) tablets.   Start drinking the other half of the Golytely jug. Drink one 8-ounce glass every 15 minutes until the jug is empty. Drink each glass quickly.  If you arrive after 11 AM:  At 6 AM on the day of the exam:  Take 2 tablets of Dulcolax (Bisacodyl).   Drink the other half of the Golytely jug.   Drink one 8-ounce glass every 15 minutes until the jug is empty.   Drink each glass quickly.   You should finish the prep 4 hours before the exam.      Day of exam:    You may take your necessary morning medications with sips of water  Do not take diabetes medicine by mouth until after your exam.  You may drink clear liquids only up until 2 hours before your arrival  time.  Do not smoke, chew tobacco, or swallow anything, including water or gum for at least 2 hours before your arrival time. This is a safety issue. Your procedure could be cancelled if you do not follow directions.  Please do not wear jewelry. Leave all valuables at home.   Please arrive with a responsible adult who can take you home after the test and stay with you for a minimum of 24 hours: The medicine used will make you sleepy and forgetful. If you do not have someone to take you home, we will cancel your procedure. If using public transportation you must have someone to ride with you.  Please perform your nebulizer treatments and airway clearance therapy in the morning prior to the procedure (if applicable).  If you have asthma, bring your inhalers.      CLEAR LIQUIDS   You may have:  Water, tea, coffee (no milk or cream)  Soda pop, Gatorade (not red or purple)  Jell-O, Popsicles (no milk or fruit pieces - not red or purple)  Fat-free soup broth or bouillon  Plain hard candy, such as clear life savers (not red or purple)  Clear juices and fruit-flavored drinks, such as apple juice, white grape juice, Hi-C, and Pepito-Aid (not red or purple)   Do not have:  Milk or milk products such as ice cream, malts or shakes, or coffee creamer  Red or purple drinks of any kind such as cranberry juice or grape juice. Avoid red or purple Jell-O, Popsicles, Pepito-Aid, sorbet, sherbet and candy  Juices with pulp such as orange, grapefruit, pineapple or tomato juice  Cream soups of any kind  Alcohol and beer  Protein drinks or protein powder     LOW FIBER DIET   You may have:    Starches: White bread, rolls, biscuits, croissants, [place] toast, white flour tortillas, waffles, pancakes, [place] toast; white rice, noodles, pasta, macaroni; cooked and peeled potatoes; plain crackers, saltines; cooked farina or cream of rice; puffed rice, corn flakes, Rice Krispies, Special K   Vegetables: tender cooked and canned, vegetable  broths  Fruits and fruit juices: Strained fruit juice, canned fruit without seeds or skin (not pineapple), applesauce, pear sauce, ripe bananas, melons (not watermelon)   Milk products: Milk (plain or flavored), cheese, cottage cheese, yogurt (no berries), custard, ice cream    Proteins: Tender, well-cooked ground beef, lamb, veal, ham, pork, chicken, turkey, fish or organ meats; eggs; creamy peanut butter   Fats and condiments:  Margarine, butter, oils, mayonnaise, sour cream, salad dressing, plain gravy; spices, cooked herbs; sugar, clear jelly, honey, syrup   Snacks, sweets and drinks: Pretzels, hard candy; plain cakes and cookies (no nuts or seeds); gelatin, plain pudding, sherbet, Popsicles; coffee, tea, carbonated ( fizzy ) drinks Do not have:    Starches: Breads or rolls that contain nuts, seeds or fruit; whole wheat or whole grain breads that contain more than 1 gram of fiber per slice; cornbread; corn or whole wheat tortillas; potatoes with skin; brown rice, wild rice, kasha (buckwheat), and oatmeal   Vegetables: Any raw or steamed vegetables; vegetables with seeds; corn in any form   Fruits and fruit juices: Prunes, prune juice, raisins and other dried fruits, berries and other fruits with seeds, canned pineapple juices with pulp such as orange, grapefruit, pineapple or tomato juice  Milk products: Any yogurt with nuts, seeds or berries   Proteins: Tough, fibrous meats with gristle; cooked dried beans, peas or lentils; crunchy peanut butter  Fats and condiments: Pickles, olives, relish, horseradish; jam, marmalade, preserves   Snacks, sweets and drinks: Popcorn, nuts, seeds, granola, coconut, candies made with nuts or seeds; all desserts that contain nuts, seeds, raisins and other dried fruits, coconut, whole grains or bran.        FAQ:    How do you know if your colon is cleaned out?   After completing the bowel prep, your bowel movements should be all liquid and yellow. Your bowel movements will look  similar to urine in the toilet. If there are pieces of stool (poop) in the toilet, or if you can't see to the bottom of the toilet, please call our office for advice. Call [phone] and ask to speak with a nurse.   Why do you need a responsible  to take you home and stay with you?  We require a responsible adult to take you home for your safety. The sedation medicines used to relax you during the procedure can impair your judgement and reaction time, make you forgetful and possible a little unsteady. Do not drive, make any important decisions, or sign any legal documents for 24 hours after your procedure.   It is normal to feel bloated and gassy after your procedure. Walking will help move the air through your colon. You can take non-aspirin pain relievers that contain acetaminophen (Tylenol).   When can you eat after your procedure?  You may resume your normal diet when you feel ready, unless advised otherwise by the doctor performing your procedure. Do not drink alcohol for 24 hours after your procedure.   You many resume normal activities (work, exercise, etc.) after 24 hours.   When will you get test results?  You should have your procedure results and any lab results (if applicable) by letter, MyChart message, or phone call within 2 weeks. If you have any questions, please call the doctor that referred you for the procedure.       Thank you for choosing [facility], for your procedure. If you are sent a survey regarding your care, please take the time to complete the questionnaire. We value your feedback!             Updated: 6/22/2022

## 2023-06-15 NOTE — H&P
"[facility] Anesthesia Pre-op History and Physical    Isacc Wang Jr. MRN# [id number]   Age: 42 year old YOB: 1980      Date of Surgery: 06/15/23   [facility]      Date of Exam 6/15/2023 Facility (Same day)       Primary care provider: Libby Alan         Chief Complaint and/or Reason for Procedure:   screening colonoscopy         Active problem list:     Patient Active Problem List    Diagnosis Date Noted     Esophageal stricture 10/07/2020     Priority: Medium     benign esophageal stricture dilated at [facility]       Obesity (BMI 35.0-39.9) with comorbidity (H) 05/21/2019     Priority: Medium     12/9/2020: BMI 35.8       Family history of colon cancer 09/29/2017     Priority: Medium     Acute idiopathic gout of right foot 12/03/2015     Priority: Medium     Hyperlipidemia LDL goal <130 10/09/2009     Priority: Medium            Medications (include herbals and vitamins):     Current Outpatient Medications   Medication Instructions     fluticasone (FLONASE) 50 MCG/ACT nasal spray 2 sprays, Both Nostrils, 2 TIMES DAILY     omeprazole (PRILOSEC) 20 mg, Oral, DAILY     ondansetron (ZOFRAN ODT) 4 mg, Oral, EVERY 8 HOURS PRN     vitamin D2 (ERGOCALCIFEROL) 50,000 Units, Oral, WEEKLY                Allergies:      Allergies   Allergen Reactions     Seasonal Allergies                Physical Exam:   All vitals have been reviewed  Patient Vitals for the past 8 hrs:   SpO2 Height Weight   06/15/23 0923 98 % 1.689 m (5' 6.5\") 106.6 kg (235 lb)     Airway assessment:   Mallampatti classification: Class II (visualization of the soft palate, fauces, and uvula)}     Lungs:   No increased work of breathing, good air exchange, clear to auscultation bilaterally     Cardiovascular:   regular rate and rhythm             Lab / Radiology Results:   N/A          Anesthetic risk and/or ASA classification:   Class II     Andrea Tapia MD       "

## 2023-06-16 ENCOUNTER — HOSPITAL ENCOUNTER (OUTPATIENT)
Facility: CLINIC | Age: 43
Discharge: HOME | End: 2023-06-16
Attending: STUDENT IN AN ORGANIZED HEALTH CARE EDUCATION/TRAINING PROGRAM | Admitting: STUDENT IN AN ORGANIZED HEALTH CARE EDUCATION/TRAINING PROGRAM
Payer: COMMERCIAL

## 2023-06-16 VITALS
TEMPERATURE: 97 F | SYSTOLIC BLOOD PRESSURE: 121 MMHG | HEART RATE: 71 BPM | RESPIRATION RATE: 11 BRPM | DIASTOLIC BLOOD PRESSURE: 93 MMHG | OXYGEN SATURATION: 100 %

## 2023-06-16 LAB — COLONOSCOPY: NORMAL

## 2023-06-16 PROCEDURE — G0121 COLON CA SCRN NOT HI RSK IND: HCPCS | Performed by: STUDENT IN AN ORGANIZED HEALTH CARE EDUCATION/TRAINING PROGRAM

## 2023-06-16 PROCEDURE — 250N000011 HC RX IP 250 OP 636: Performed by: STUDENT IN AN ORGANIZED HEALTH CARE EDUCATION/TRAINING PROGRAM

## 2023-06-16 PROCEDURE — G0500 MOD SEDAT ENDO SERVICE >5YRS: HCPCS | Performed by: STUDENT IN AN ORGANIZED HEALTH CARE EDUCATION/TRAINING PROGRAM

## 2023-06-16 PROCEDURE — G0105 COLORECTAL SCRN; HI RISK IND: HCPCS | Performed by: STUDENT IN AN ORGANIZED HEALTH CARE EDUCATION/TRAINING PROGRAM

## 2023-06-16 PROCEDURE — 45378 DIAGNOSTIC COLONOSCOPY: CPT | Performed by: STUDENT IN AN ORGANIZED HEALTH CARE EDUCATION/TRAINING PROGRAM

## 2023-06-16 RX ORDER — FLUMAZENIL 0.1 MG/ML
0.2 INJECTION, SOLUTION INTRAVENOUS
Status: DISCONTINUED | OUTPATIENT
Start: 2023-06-16 | End: 2023-06-16 | Stop reason: HOSPADM

## 2023-06-16 RX ORDER — NALOXONE HYDROCHLORIDE 0.4 MG/ML
0.4 INJECTION, SOLUTION INTRAMUSCULAR; INTRAVENOUS; SUBCUTANEOUS
Status: DISCONTINUED | OUTPATIENT
Start: 2023-06-16 | End: 2023-06-16 | Stop reason: HOSPADM

## 2023-06-16 RX ORDER — ONDANSETRON 2 MG/ML
4 INJECTION INTRAMUSCULAR; INTRAVENOUS EVERY 6 HOURS PRN
Status: DISCONTINUED | OUTPATIENT
Start: 2023-06-16 | End: 2023-06-16 | Stop reason: HOSPADM

## 2023-06-16 RX ORDER — SIMETHICONE 40MG/0.6ML
133 SUSPENSION, DROPS(FINAL DOSAGE FORM)(ML) ORAL
Status: DISCONTINUED | OUTPATIENT
Start: 2023-06-16 | End: 2023-06-16 | Stop reason: HOSPADM

## 2023-06-16 RX ORDER — EPINEPHRINE 1 MG/ML
0.1 INJECTION, SOLUTION INTRAMUSCULAR; SUBCUTANEOUS
Status: DISCONTINUED | OUTPATIENT
Start: 2023-06-16 | End: 2023-06-16 | Stop reason: HOSPADM

## 2023-06-16 RX ORDER — ATROPINE SULFATE 0.1 MG/ML
1 INJECTION INTRAVENOUS
Status: DISCONTINUED | OUTPATIENT
Start: 2023-06-16 | End: 2023-06-16 | Stop reason: HOSPADM

## 2023-06-16 RX ORDER — LIDOCAINE 40 MG/G
CREAM TOPICAL
Status: DISCONTINUED | OUTPATIENT
Start: 2023-06-16 | End: 2023-06-16 | Stop reason: HOSPADM

## 2023-06-16 RX ORDER — DIPHENHYDRAMINE HYDROCHLORIDE 50 MG/ML
25-50 INJECTION INTRAMUSCULAR; INTRAVENOUS
Status: DISCONTINUED | OUTPATIENT
Start: 2023-06-16 | End: 2023-06-16 | Stop reason: HOSPADM

## 2023-06-16 RX ORDER — ONDANSETRON 4 MG/1
4 TABLET, ORALLY DISINTEGRATING ORAL EVERY 6 HOURS PRN
Status: DISCONTINUED | OUTPATIENT
Start: 2023-06-16 | End: 2023-06-16 | Stop reason: HOSPADM

## 2023-06-16 RX ORDER — ONDANSETRON 2 MG/ML
4 INJECTION INTRAMUSCULAR; INTRAVENOUS
Status: DISCONTINUED | OUTPATIENT
Start: 2023-06-16 | End: 2023-06-16 | Stop reason: HOSPADM

## 2023-06-16 RX ORDER — PROCHLORPERAZINE MALEATE 10 MG
10 TABLET ORAL EVERY 6 HOURS PRN
Status: DISCONTINUED | OUTPATIENT
Start: 2023-06-16 | End: 2023-06-16 | Stop reason: HOSPADM

## 2023-06-16 RX ORDER — NALOXONE HYDROCHLORIDE 0.4 MG/ML
0.2 INJECTION, SOLUTION INTRAMUSCULAR; INTRAVENOUS; SUBCUTANEOUS
Status: DISCONTINUED | OUTPATIENT
Start: 2023-06-16 | End: 2023-06-16 | Stop reason: HOSPADM

## 2023-06-16 RX ORDER — FENTANYL CITRATE 50 UG/ML
50-100 INJECTION, SOLUTION INTRAMUSCULAR; INTRAVENOUS EVERY 5 MIN PRN
Status: DISCONTINUED | OUTPATIENT
Start: 2023-06-16 | End: 2023-06-16 | Stop reason: HOSPADM

## 2023-06-16 RX ADMIN — MIDAZOLAM 2 MG: 1 INJECTION INTRAMUSCULAR; INTRAVENOUS at 07:53

## 2023-06-16 RX ADMIN — FENTANYL CITRATE 100 MCG: 50 INJECTION, SOLUTION INTRAMUSCULAR; INTRAVENOUS at 07:53

## 2023-06-16 ASSESSMENT — ACTIVITIES OF DAILY LIVING (ADL): ADLS_ACUITY_SCORE: 35

## 2023-07-10 ENCOUNTER — TELEPHONE (OUTPATIENT)
Dept: FAMILY MEDICINE | Facility: CLINIC | Age: 43
End: 2023-07-10
Payer: COMMERCIAL

## 2023-07-10 NOTE — LETTER
July 11, 2023      Isacc Wang   [address]   [place] [address]        To Whom It May Concern:    Isacc Wang JrGui was seen in our clinic. He may return to work without restrictions.      Sincerely,        YASMANY FARIAS MD

## 2023-07-10 NOTE — TELEPHONE ENCOUNTER
FYI - Status Update    Who is Calling: patient    Update: Patient is requesting a letter for days missed  at work but also a COVID test order as well. Please advise.     Does caller want a call/response back: Yes     Could we send this information to you in [employer] or would you prefer to receive a phone call?:   Patient would prefer a phone call   Okay to leave a detailed message?: Yes at Cell number on file:    Telephone Information:   Mobile [phone]

## 2023-07-10 NOTE — LETTER
July 11, 2023      Isacc Wang   [address]   [place] [address]        To Whom It May Concern:    Isacc Wang JrGui was seen in our clinic. He may return to work without restrictions.      Sincerely,        YASMANY FARIAS MD

## 2023-07-11 ENCOUNTER — TELEPHONE (OUTPATIENT)
Dept: FAMILY MEDICINE | Facility: CLINIC | Age: 43
End: 2023-07-11
Payer: COMMERCIAL

## 2023-07-11 NOTE — TELEPHONE ENCOUNTER
Reason for Call:  Appointment Request    Patient requesting this type of appt:  Patient is calling in to get scheduled. He sent a E-visit request for COVID to Dr. Alan.     Requested provider: Libby Alan    Reason patient unable to be scheduled: Needs to be scheduled by clinic    When does patient want to be seen/preferred time: See E-visit request     Comments: Patient would like a return to work note.     7/4/2023 - Current he can't return to work till he has a note to return.    Could we send this information to you in [employer] or would you prefer to receive a phone call?:   Patient would prefer a phone call   Okay to leave a detailed message?: Yes at Cell number on file:    Telephone Information:   Mobile [phone]     Call taken on 7/11/2023 at 11:03 AM by Andrzej Moralez

## 2023-07-11 NOTE — TELEPHONE ENCOUNTER
Please call patient and inform him that a communication letter was sent via [employer] providing him a return to work without restrictions    [facility] [place]

## 2023-08-17 DIAGNOSIS — K21.9 GASTROESOPHAGEAL REFLUX DISEASE WITHOUT ESOPHAGITIS: ICD-10-CM

## 2023-09-24 ENCOUNTER — VIRTUAL VISIT (OUTPATIENT)
Dept: URGENT CARE | Facility: CLINIC | Age: 43
End: 2023-09-24
Payer: COMMERCIAL

## 2023-09-24 ENCOUNTER — NURSE TRIAGE (OUTPATIENT)
Dept: NURSING | Facility: CLINIC | Age: 43
End: 2023-09-24

## 2023-09-24 DIAGNOSIS — Z20.822 EXPOSURE TO 2019 NOVEL CORONAVIRUS: Primary | ICD-10-CM

## 2023-09-24 PROCEDURE — 87635 SARS-COV-2 COVID-19 AMP PRB: CPT | Performed by: NURSE PRACTITIONER

## 2023-09-24 PROCEDURE — 99442 PR PHYSICIAN TELEPHONE EVALUATION 11-20 MIN: CPT | Mod: 95

## 2023-09-24 NOTE — PROGRESS NOTES
Isacc is a 43 year old who is being evaluated via a billable telephone visit.      What phone number would you like to be contacted at? [phone]  How would you like to obtain your AVS? Jamshid    [place] Location (provider location):  Off-site    Assessment & Plan     (Z20.822) Exposure to 2019 novel coronavirus  (primary encounter diagnosis)    Plan: Asymptomatic COVID-19 Virus (Coronavirus) by         PCR      [facility]  Virtual Urgent Care  [facility] VIRTUAL URGENT CARE    Subjective   Isacc is a 43 year old, presenting for the following health issues:  COVID    HPI     No symptoms  Covid exposure at work needs test to return      Objective           Vitals:  No vitals were obtained today due to virtual visit.    Physical Exam   healthy, alert, and no distress  PSYCH: Alert and oriented times 3; coherent speech, normal   rate and volume, able to articulate logical thoughts, able   to abstract reason, no tangential thoughts, no hallucinations   or delusions  His affect is normal  RESP: No cough, no audible wheezing, able to talk in full sentences  Remainder of exam unable to be completed due to telephone visits      Phone call duration: 12 minutes

## 2023-09-24 NOTE — TELEPHONE ENCOUNTER
Patient calling stating his employer is requiring COVID 19 testing for known exposure at work.    Patient denies symptoms. Declines further information on exposure. Reviewed with patient testing would require a MD order.    Requesting to schedule only. Connected to Central Scheduling.    Silvia Kelly RN  [facility]    Reason for Disposition   Requesting regular office appointment    Additional Information   Negative: [1] Caller is not with the adult (patient) AND [2] reporting urgent symptoms   Negative: Lab result questions   Negative: Medication questions   Negative: Caller can't be reached by phone   Negative: Caller has already spoken to PCP or another triager   Negative: RN needs further essential information from caller in order to complete triage    Protocols used: Information Only Call - No Triage-A-

## 2023-09-25 LAB — SARS-COV-2 RNA RESP QL NAA+PROBE: NEGATIVE

## 2024-01-11 ENCOUNTER — PATIENT OUTREACH (OUTPATIENT)
Dept: CARE COORDINATION | Facility: CLINIC | Age: 44
End: 2024-01-11
Payer: COMMERCIAL

## 2024-01-25 ENCOUNTER — PATIENT OUTREACH (OUTPATIENT)
Dept: CARE COORDINATION | Facility: CLINIC | Age: 44
End: 2024-01-25
Payer: COMMERCIAL

## 2024-03-26 DIAGNOSIS — K21.9 GASTROESOPHAGEAL REFLUX DISEASE WITHOUT ESOPHAGITIS: ICD-10-CM

## 2024-04-07 ENCOUNTER — HEALTH MAINTENANCE LETTER (OUTPATIENT)
Age: 44
End: 2024-04-07

## 2024-05-07 ENCOUNTER — OFFICE VISIT (OUTPATIENT)
Dept: FAMILY MEDICINE | Facility: CLINIC | Age: 44
End: 2024-05-07
Payer: COMMERCIAL

## 2024-05-07 VITALS
DIASTOLIC BLOOD PRESSURE: 83 MMHG | WEIGHT: 247.2 LBS | BODY MASS INDEX: 41.19 KG/M2 | TEMPERATURE: 98.6 F | SYSTOLIC BLOOD PRESSURE: 125 MMHG | HEART RATE: 82 BPM | OXYGEN SATURATION: 97 % | HEIGHT: 65 IN | RESPIRATION RATE: 20 BRPM

## 2024-05-07 DIAGNOSIS — E78.5 HYPERLIPIDEMIA LDL GOAL <130: ICD-10-CM

## 2024-05-07 DIAGNOSIS — E66.01 MORBID OBESITY (H): ICD-10-CM

## 2024-05-07 DIAGNOSIS — Z00.00 ROUTINE GENERAL MEDICAL EXAMINATION AT A HEALTH CARE FACILITY: Primary | ICD-10-CM

## 2024-05-07 DIAGNOSIS — D17.9 LIPOMA, UNSPECIFIED SITE: ICD-10-CM

## 2024-05-07 DIAGNOSIS — Z13.1 SCREENING FOR DIABETES MELLITUS: ICD-10-CM

## 2024-05-07 LAB — HBA1C MFR BLD: 5.5 % (ref 0–5.6)

## 2024-05-07 PROCEDURE — 99396 PREV VISIT EST AGE 40-64: CPT | Performed by: STUDENT IN AN ORGANIZED HEALTH CARE EDUCATION/TRAINING PROGRAM

## 2024-05-07 PROCEDURE — 80053 COMPREHEN METABOLIC PANEL: CPT | Performed by: STUDENT IN AN ORGANIZED HEALTH CARE EDUCATION/TRAINING PROGRAM

## 2024-05-07 PROCEDURE — 36415 COLL VENOUS BLD VENIPUNCTURE: CPT | Performed by: STUDENT IN AN ORGANIZED HEALTH CARE EDUCATION/TRAINING PROGRAM

## 2024-05-07 PROCEDURE — 80061 LIPID PANEL: CPT | Performed by: STUDENT IN AN ORGANIZED HEALTH CARE EDUCATION/TRAINING PROGRAM

## 2024-05-07 PROCEDURE — 99214 OFFICE O/P EST MOD 30 MIN: CPT | Mod: 25 | Performed by: STUDENT IN AN ORGANIZED HEALTH CARE EDUCATION/TRAINING PROGRAM

## 2024-05-07 PROCEDURE — 83036 HEMOGLOBIN GLYCOSYLATED A1C: CPT | Performed by: STUDENT IN AN ORGANIZED HEALTH CARE EDUCATION/TRAINING PROGRAM

## 2024-05-07 SDOH — HEALTH STABILITY: PHYSICAL HEALTH: ON AVERAGE, HOW MANY DAYS PER WEEK DO YOU ENGAGE IN MODERATE TO STRENUOUS EXERCISE (LIKE A BRISK WALK)?: 3 DAYS

## 2024-05-07 ASSESSMENT — SOCIAL DETERMINANTS OF HEALTH (SDOH): HOW OFTEN DO YOU GET TOGETHER WITH FRIENDS OR RELATIVES?: THREE TIMES A WEEK

## 2024-05-07 NOTE — PATIENT INSTRUCTIONS
"Preventive Care Advice   This is general advice we often give to help people stay healthy. Your care team may have specific advice just for you. Please talk to your care team about your own preventive care needs.  Lifestyle  Exercise at least 150 minutes each week (30 minutes a day, 5 days a week).  Do muscle strengthening activities 2 days a week. These help control your weight and prevent disease.  No smoking.  Wear sunscreen to prevent skin cancer.  Have your home tested for radon every 2 to 5 years. Radon is a colorless, odorless gas that can harm your lungs. To learn more, go to www.health.[facility].mn. and search for \"Radon in Homes.\"  Keep guns unloaded and locked up in a safe place like a safe or gun vault, or, use a gun lock and hide the keys. Always lock away bullets separately. To learn more, visit [employer].[place].gov and search for \"safe gun storage.\"  Nutrition  Eat 5 or more servings of fruits and vegetables each day.  Try wheat bread, brown rice and whole grain pasta (instead of white bread, rice, and pasta).  Get enough calcium and vitamin D. Check the label on foods and aim for 100% of the RDA (recommended daily allowance).  Regular exams  Have a dental exam and cleaning every 6 months.  See your health care team every year to talk about:  Any changes in your health.  Any medicines your care team has prescribed.  Preventive care, family planning, and ways to prevent chronic diseases.  Shots (vaccines)   HPV shots (up to age 26), if you've never had them before.  Hepatitis B shots (up to age 59), if you've never had them before.  COVID-19 shot: Get this shot when it's due.  Flu shot: Get a flu shot every year.  Tetanus shot: Get a tetanus shot every 10 years.  Pneumococcal, hepatitis A, and RSV shots: Ask your care team if you need these based on your risk.  Shingles shot (for age 50 and up).  General health tests  Diabetes screening:  Starting at age 35, Get screened for diabetes at least every 3 years.  If " you are younger than age 35, ask your care team if you should be screened for diabetes.  Cholesterol test: At age 39, start having a cholesterol test every 5 years, or more often if advised.  Bone density scan (DEXA): At age 50, ask your care team if you should have this scan for osteoporosis (brittle bones).  Hepatitis C: Get tested at least once in your life.  Abdominal aortic aneurysm screening: Talk to your doctor about having this screening if you:  Have ever smoked; and  Are biologically male; and  Are between the ages of 65 and 75.  STIs (sexually transmitted infections)  Before age 24: Ask your care team if you should be screened for STIs.  After age 24: Get screened for STIs if you're at risk. You are at risk for STIs (including HIV) if:  You are sexually active with more than one person.  You don't use condoms every time.  You or a partner was diagnosed with a sexually transmitted infection.  If you are at risk for HIV, ask about PrEP medicine to prevent HIV.  Get tested for HIV at least once in your life, whether you are at risk for HIV or not.  Cancer screening tests  Cervical cancer screening: If you have a cervix, begin getting regular cervical cancer screening tests at age 21. Most people who have regular screenings with normal results can stop after age 65. Talk about this with your provider.  Breast cancer scan (mammogram): If you've ever had breasts, begin having regular mammograms starting at age 40. This is a scan to check for breast cancer.  Colon cancer screening: It is important to start screening for colon cancer at age 45.  Have a colonoscopy test every 10 years (or more often if you're at risk) Or, ask your provider about stool tests like a FIT test every year or Cologuard test every 3 years.  To learn more about your testing options, visit: www.[employer]/176233.pdf.  For help making a decision, visit: efe/jd26325.  Prostate cancer screening test: If you have a prostate and are age 55  to 69, ask your provider if you would benefit from a yearly prostate cancer screening test.  Lung cancer screening: If you are a current or former smoker age 50 to 80, ask your care team if ongoing lung cancer screenings are right for you.  For informational purposes only. Not to replace the advice of your health care provider. Copyright   2023 [employer]. All rights reserved. Clinically reviewed by the [facility]. [employer] [id number] - REV 04/24.    Learning About Stress  What is stress?     Stress is your body's response to a hard situation. Your body can have a physical, emotional, or mental response. Stress is a fact of life for most people, and it affects everyone differently. What causes stress for you may not be stressful for someone else.  A lot of things can cause stress. You may feel stress when you go on a job interview, take a test, or run a race. This kind of short-term stress is normal and even useful. It can help you if you need to work hard or react quickly. For example, stress can help you finish an important job on time.  Long-term stress is caused by ongoing stressful situations or events. Examples of long-term stress include long-term health problems, ongoing problems at work, or conflicts in your family. Long-term stress can harm your health.  How does stress affect your health?  When you are stressed, your body responds as though you are in danger. It makes hormones that speed up your heart, make you breathe faster, and give you a burst of energy. This is called the fight-or-flight stress response. If the stress is over quickly, your body goes back to normal and no harm is done.  But if stress happens too often or lasts too long, it can have bad effects. Long-term stress can make you more likely to get sick, and it can make symptoms of some diseases worse. If you tense up when you are stressed, you may develop neck, shoulder, or low back pain. Stress is  linked to high blood pressure and heart disease.  Stress also harms your emotional health. It can make you young, tense, or depressed. Your relationships may suffer, and you may not do well at work or school.  What can you do to manage stress?  You can try these things to help manage stress:   Do something active. Exercise or activity can help reduce stress. Walking is a great way to get started. Even everyday activities such as housecleaning or yard work can help.  Try yoga or jay chi. These techniques combine exercise and meditation. You may need some training at first to learn them.  Do something you enjoy. For example, listen to music or go to a movie. Practice your hobby or do volunteer work.  Meditate. This can help you relax, because you are not worrying about what happened before or what may happen in the future.  Do guided imagery. Imagine yourself in any setting that helps you feel calm. You can use online videos, books, or a teacher to guide you.  Do breathing exercises. For example:  From a standing position, bend forward from the waist with your knees slightly bent. Let your arms dangle close to the floor.  Breathe in slowly and deeply as you return to a standing position. Roll up slowly and lift your head last.  Hold your breath for just a few seconds in the standing position.  Breathe out slowly and bend forward from the waist.  Let your feelings out. Talk, laugh, cry, and express anger when you need to. Talking with supportive friends or family, a counselor, or a umm leader about your feelings is a healthy way to relieve stress. Avoid discussing your feelings with people who make you feel worse.  Write. It may help to write about things that are bothering you. This helps you find out how much stress you feel and what is causing it. When you know this, you can find better ways to cope.  What can you do to prevent stress?  You might try some of these things to help prevent stress:  Manage your time.  "This helps you find time to do the things you want and need to do.  Get enough sleep. Your body recovers from the stresses of the day while you are sleeping.  Get support. Your family, friends, and community can make a difference in how you experience stress.  Limit your news feed. Avoid or limit time on social media or news that may make you feel stressed.  Do something active. Exercise or activity can help reduce stress. Walking is a great way to get started.  Where can you learn more?  Go to https://www.[employer].net/patiented  Enter N032 in the search box to learn more about \"Learning About Stress.\"  Current as of: October 24, 2023               Content Version: 14.0    1353-7710 [employer].   Care instructions adapted under license by your healthcare professional. If you have questions about a medical condition or this instruction, always ask your healthcare professional. [employer] disclaims any warranty or liability for your use of this information.      "

## 2024-05-07 NOTE — PROGRESS NOTES
"Preventive Care Visit  [facility]  YASMANY VASQUEZ MD, Family Medicine  May 7, 2024      Assessment & Plan     (Z00.00) Routine general medical examination at a health care facility  (primary encounter diagnosis)  Comment: Stable  Plan: REVIEW OF HEALTH MAINTENANCE PROTOCOL ORDERS,         Comprehensive metabolic panel (BMP + Alb, Alk         Phos, ALT, AST, Total. Bili, TP)            (E78.5) Hyperlipidemia LDL goal <130  Comment: Chronic, stable  Plan: Lipid panel reflex to direct LDL Non-fasting            (E66.01) Morbid obesity (H)  Comment: Chronic, uncontrolled. Dietary changes and exercise discussed  Plan: Continue to monitor     (D17.9) Lipoma, unspecified site  Comment: Chronic, uncontrolled. Pending eval   Plan: Adult Dermatology  Referral            (Z13.1) Screening for diabetes mellitus  Comment: Stable  Plan: Hemoglobin A1c            Dictation Disclaimer: Some of this Note has been completed with voice-recognition dictation software. Although errors are generally corrected real-time, there is the potential for a rare error to be present in the completed chart.                   BMI  Estimated body mass index is 41.78 kg/m  as calculated from the following:    Height as of this encounter: 1.638 m (5' 4.5\").    Weight as of this encounter: 112.1 kg (247 lb 3.2 oz).   Weight management plan: Discussed healthy diet and exercise guidelines    Counseling  Appropriate preventive services were discussed with this patient, including applicable screening as appropriate for fall prevention, nutrition, physical activity, Tobacco-use cessation, weight loss and cognition.  Checklist reviewing preventive services available has been given to the patient.  Reviewed patient's diet, addressing concerns and/or questions.   He is at risk for lack of exercise and has been provided with information to increase physical activity for the benefit of his well-being.   He is at risk " for psychosocial distress and has been provided with information to reduce risk.           Milton Dexter is a 43 year old, presenting for the following:  Physical        5/7/2024     3:12 PM   Additional Questions   Roomed by [facility] Directive  Patient does not have a Health Care Directive or Living Will: Discussed advance care planning with patient; information given to patient to review.    HPI  History of Present Illness  The patient is a 43-year-old male who presents for evaluation of multiple medical concerns.    The patient reports fluctuating weight, currently weighing approximately 242 pounds, with the lowest recorded weight being 232 pounds. Despite resuming gym workouts, he has noticed a slight weight gain.    The patient presents with multiple lipomas, one of which is located on the posterior aspect of his leg. He expresses concern that the lipomas may have metastasized to the muscle.    The patient has been on omeprazole for an extended period and is inquiring about the continuation of this medication, noting its efficacy in managing his symptoms.       He has a family history of heart disease.                 5/7/2024   General Health   How would you rate your overall physical health? Excellent   Feel stress (tense, anxious, or unable to sleep) Only a little   (!) STRESS CONCERN      5/7/2024   Nutrition   Three or more servings of calcium each day? Yes   Diet: Low salt    Other   If other, please elaborate: it varys on how i fill   How many servings of fruit and vegetables per day? (!) 2-3   How many sweetened beverages each day? (!) 2         5/7/2024   Exercise   Days per week of moderate/strenous exercise 3 days         5/7/2024   Social Factors   Frequency of gathering with friends or relatives Three times a week   Worry food won't last until get money to buy more No   Food not last or not have enough money for food? No   Do you have housing?  Yes   Are you worried about  "losing your housing? No   Lack of transportation? No   Unable to get utilities (heat,electricity)? No         5/7/2024   Dental   Dentist two times every year? Yes            Today's PHQ-2 Score:       5/7/2024     2:58 PM   PHQ-2 ( 1999 [employer])   Q1: Little interest or pleasure in doing things 0   Q2: Feeling down, depressed or hopeless 0   PHQ-2 Score 0   Q1: Little interest or pleasure in doing things Not at all   Q2: Feeling down, depressed or hopeless Not at all   PHQ-2 Score 0           5/7/2024   Substance Use   Alcohol more than 3/day or more than 7/wk No   Do you use any other substances recreationally? No     Social History     Tobacco Use    Smoking status: Never    Smokeless tobacco: Never   Vaping Use    Vaping status: Never Used   Substance Use Topics    Alcohol use: Not Currently    Drug use: No           5/7/2024   STI Screening   New sexual partner(s) since last STI/HIV test? No   ASCVD Risk   The 10-year ASCVD risk score (Ramses CORDERO, et al., 2019) is: 4.1%    Values used to calculate the score:      Age: 43 years      Sex: Male      Is Non- : Yes      Diabetic: No      Tobacco smoker: No      Systolic Blood Pressure: 125 mmHg      Is BP treated: No      HDL Cholesterol: 46 mg/dL      Total Cholesterol: 212 mg/dL        5/7/2024   Contraception/Family Planning   Questions about contraception or family planning No        Reviewed and updated as needed this visit by Provider                         ROS: 10 point ROS neg other than the symptoms noted above in the HPI.       Objective    Exam  /83   Pulse 82   Temp 98.6  F (37  C) (Oral)   Resp 20   Ht 1.638 m (5' 4.5\")   Wt 112.1 kg (247 lb 3.2 oz)   SpO2 97%   BMI 41.78 kg/m     Estimated body mass index is 41.78 kg/m  as calculated from the following:    Height as of this encounter: 1.638 m (5' 4.5\").    Weight as of this encounter: 112.1 kg (247 lb 3.2 oz).    Physical Exam  GENERAL: healthy, alert and no " distress  EYES: Eyes grossly normal to inspection, PERRL and conjunctivae and sclerae normal  Neck: No visible JVD or lymphadenopathy   RESP: symmetrical rise in chest   CV: No peripheral edema notable   MS: no gross musculoskeletal defects noted  SKIN: lipomas seen on right triceps and right forearm   PSYCH: mentation appears normal, affect normal/bright          Signed Electronically by: YASMANY VASQUEZ MD

## 2024-05-08 LAB
ALBUMIN SERPL BCG-MCNC: 4.1 G/DL (ref 3.5–5.2)
ALP SERPL-CCNC: 59 U/L (ref 40–150)
ALT SERPL W P-5'-P-CCNC: 22 U/L (ref 0–70)
ANION GAP SERPL CALCULATED.3IONS-SCNC: 9 MMOL/L (ref 7–15)
AST SERPL W P-5'-P-CCNC: 18 U/L (ref 0–45)
BILIRUB SERPL-MCNC: 0.2 MG/DL
BUN SERPL-MCNC: 11.6 MG/DL (ref 6–20)
CALCIUM SERPL-MCNC: 9.6 MG/DL (ref 8.6–10)
CHLORIDE SERPL-SCNC: 106 MMOL/L (ref 98–107)
CHOLEST SERPL-MCNC: 187 MG/DL
CREAT SERPL-MCNC: 0.79 MG/DL (ref 0.67–1.17)
DEPRECATED HCO3 PLAS-SCNC: 24 MMOL/L (ref 22–29)
EGFRCR SERPLBLD CKD-EPI 2021: >90 ML/MIN/1.73M2
FASTING STATUS PATIENT QL REPORTED: NO
FASTING STATUS PATIENT QL REPORTED: NO
GLUCOSE SERPL-MCNC: 99 MG/DL (ref 70–99)
HDLC SERPL-MCNC: 43 MG/DL
LDLC SERPL CALC-MCNC: 115 MG/DL
NONHDLC SERPL-MCNC: 144 MG/DL
POTASSIUM SERPL-SCNC: 4.7 MMOL/L (ref 3.4–5.3)
PROT SERPL-MCNC: 7.5 G/DL (ref 6.4–8.3)
SODIUM SERPL-SCNC: 139 MMOL/L (ref 135–145)
TRIGL SERPL-MCNC: 143 MG/DL

## 2024-05-14 ENCOUNTER — TELEPHONE (OUTPATIENT)
Dept: FAMILY MEDICINE | Facility: CLINIC | Age: 44
End: 2024-05-14
Payer: COMMERCIAL

## 2024-05-14 NOTE — LETTER
May 14, 2024      Isacc Wang Jr.  [address]   [place] [address]        To Whom It May Concern:    Isacc Wang Jr. was seen in our clinic 05/7/2024 for his annual physical.  Patient has chronic conditions that appear to be stable.  Patient is working on his overall health with regards to weight loss that will improve and decrease his risk for comorbidities including hypertension, type 2 diabetes, worsening hyperlipidemia.      Sincerely,        YASMANY VASQUEZ MD

## 2024-05-14 NOTE — TELEPHONE ENCOUNTER
Forms/Letter Request    Type of form/letter: OTHER: Patient brought in 5/13/2024  Do we have the form/letter: Unknown  Who is the form from? Patient  Where did/will the form come from? Patient or family brought in       When is form/letter needed by: 05/14/24  How would you like the form/letter returned:   Patient Notified form requests are processed in 5-7 business days:No    Could we send this information to you in [facility] or would you prefer to receive a phone call?:   Patient would prefer a phone call   Okay to leave a detailed message?: Yes at Cell number on file:    Telephone Information:   Mobile [phone]     Patient said it would be complete and ready for  today May 13, 2024    Patient did give it / Organizational Physical form. There is a dead line. He needs this done and in by 1:30 pm today May 14, 2024    This was not given to the  it was give to a nurse on her team.     Mora Moralez,

## 2024-05-14 NOTE — PROGRESS NOTES
Spoke with patient on the phone. Pt's tone of voice was elevated and not understanding as I was explaining the policy for forms to be completed per our facility.     I stressed to the patient that these forms that he has provided do not exhibits a medical examination and that my medical license is on the line with forms that I complete.    According to pt, he was not informed of the 72 hours to 1 week policy when he arrived yesterday to drop the form off.     I do not think this patient-physician relationship is a good fit and will be removing myself as his PCP

## 2024-10-07 DIAGNOSIS — K21.9 GASTROESOPHAGEAL REFLUX DISEASE WITHOUT ESOPHAGITIS: ICD-10-CM

## 2024-10-22 ENCOUNTER — OFFICE VISIT (OUTPATIENT)
Dept: DERMATOLOGY | Facility: CLINIC | Age: 44
End: 2024-10-22
Payer: COMMERCIAL

## 2024-10-22 DIAGNOSIS — D17.9 LIPOMA, UNSPECIFIED SITE: ICD-10-CM

## 2024-10-22 PROCEDURE — 99203 OFFICE O/P NEW LOW 30 MIN: CPT | Performed by: PHYSICIAN ASSISTANT

## 2024-10-22 ASSESSMENT — PAIN SCALES - GENERAL: PAINLEVEL: NO PAIN (0)

## 2024-10-22 NOTE — PROGRESS NOTES
HPI:   Chief complaints: Isacc Wang Jr. is a pleasant 44 year old male who presents for evaluation of three lumps beneath the skin. He has had all the areas for a while but they are starting to become tender      PHYSICAL EXAM:    There were no vitals taken for this visit.  Skin exam performed as follows: Type 5 skin. Mood appropriate  Alert and Oriented X 3. Well developed, well nourished in no distress.  General appearance: Normal  Head including face: Normal  Eyes: conjunctiva and lids: Normal  Mouth: Lips, teeth, gums: Normal  Neck: Normal  Skin: Scalp and body hair: See below    Right forearm with 2.5 cm smooth rubbery subcutaneous nodule  Right triceps with 2.5 cm smooth rubbery subcutaneous nodule  Right posterior thigh with 7 x 5 cm smooth rubbery subcutaneous nodule    ASSESSMENT/PLAN:     Multiple lipomas - all becoming bothersome. He will schedule for excision of these.           Follow-up: PRN  CC:   Scribed By: Tara Thurston, MS, PATANYA

## 2024-10-22 NOTE — PATIENT INSTRUCTIONS
Proper skin care from [facility]:    -Eliminate harsh soaps as they strip the natural oils from the skin, often resulting in dry itchy skin ( i.e. Dial, Zest, [place] Spring)  -Use mild soaps such as Cetaphil or Dove Sensitive Skin in the shower. You do not need to use soap on arms, legs, and trunk every time you shower unless visibly soiled.   -Avoid hot or cold showers.  -After showering, lightly dry off and apply moisturizing within 2-3 minutes. This will help trap moisture in the skin.   -Aggressive use of a moisturizer at least 1-2 times a day to the entire body (including -Vanicream, Cetaphil, Aquaphor or Cerave) and moisturize hands after every washing.  -We recommend using moisturizers that come in a tub that needs to be scooped out, not a pump. This has more of an oil base. It will hold moisture in your skin much better than a water base moisturizer. The above recommended are non-pore clogging.      Wear a sunscreen with at least SPF 30 on your face, ears, neck and V of the chest daily. Wear sunscreen on other areas of the body if those areas are exposed to the sun throughout the day. Sunscreens can contain physical and/or chemical blockers. Physical blockers are less likely to clog pores, these include zinc oxide and titanium dioxide. Reapply every two hour and after swimming.     Sunscreen examples: https://www.ewg.org/sunscreen/    UV radiation  UVA radiation remains constant throughout the day and throughout the year. It is a longer wavelength than UVB and therefore penetrates deeper into the skin leading to immediate and delayed tanning, photoaging, and skin cancer. 70-80% of UVA and UVB radiation occurs between the hours of 10am-2pm.  UVB radiation  UVB radiation causes the most harmful effects and is more significant during the summer months. However, snow and ice can reflect UVB radiation leading to skin damage during the winter months as well. UVB radiation is responsible for tanning,  burning, inflammation, delayed erythema (pinkness), pigmentation (brown spots), and skin cancer.     I recommend self monthly full body exams and yearly full body exams with a dermatology provider. If you develop a new or changing lesion please follow up for examination. Most skin cancers are pink and scaly or pink and pearly. However, we do see blue/brown/black skin cancers.  Consider the ABCDEs of melanoma when giving yourself your monthly full body exam ( don't forget the groin, buttocks, feet, toes, etc). A-asymmetry, B-borders, C-color, D-diameter, E-elevation or evolving. If you see any of these changes please follow up in clinic. If you cannot see your back I recommend purchasing a hand held mirror to use with a larger wall mirror.       Checking for Skin Cancer  You can find cancer early by checking your skin each month. There are 3 kinds of skin cancer. They are melanoma, basal cell carcinoma, and squamous cell carcinoma. Doing monthly skin checks is the best way to find new marks or skin changes. Follow the instructions below for checking your skin.   The ABCDEs of checking moles for melanoma   Check your moles or growths for signs of melanoma using ABCDE:   Asymmetry: the sides of the mole or growth don t match  Border: the edges are ragged, notched, or blurred  Color: the color within the mole or growth varies  Diameter: the mole or growth is larger than 6 mm (size of a pencil eraser)  Evolving: the size, shape, or color of the mole or growth is changing (evolving is not shown in the images below)    Checking for other types of skin cancer  Basal cell carcinoma or squamous cell carcinoma have symptoms such as:     A spot or mole that looks different from all other marks on your skin  Changes in how an area feels, such as itching, tenderness, or pain  Changes in the skin's surface, such as oozing, bleeding, or scaliness  A sore that does not heal  New swelling or redness beyond the border of a  mole    Who s at risk?  Anyone can get skin cancer. But you are at greater risk if you have:   Fair skin, light-colored hair, or light-colored eyes  Many moles or abnormal moles on your skin  A history of sunburns from sunlight or tanning beds  A family history of skin cancer  A history of exposure to radiation or chemicals  A weakened immune system  If you have had skin cancer in the past, you are at risk for recurring skin cancer.   How to check your skin  Do your monthly skin checkups in front of a full-length mirror. Check all parts of your body, including your:   Head (ears, face, neck, and scalp)  Torso (front, back, and sides)  Arms (tops, undersides, upper, and lower armpits)  Hands (palms, backs, and fingers, including under the nails)  Buttocks and genitals  Legs (front, back, and sides)  Feet (tops, soles, toes, including under the nails, and between toes)  If you have a lot of moles, take digital photos of them each month. Make sure to take photos both up close and from a distance. These can help you see if any moles change over time.   Most skin changes are not cancer. But if you see any changes in your skin, call your doctor right away. Only he or she can diagnose a problem. If you have skin cancer, seeing your doctor can be the first step toward getting the treatment that could save your life.   [employer] last reviewed this educational content on 4/1/2019 2000-2020 [employer]. [address] [place] [address], [place] [address]. All rights reserved. This information is not intended as a substitute for professional medical care. Always follow your healthcare professional's instructions.       When should I call my doctor?  If you are worsening or not improving, please, contact us or seek urgent care as noted below.     Who should I call with questions (adults)?    [facility] [phone]  For urgent needs outside of business hours call the [facility] at  [phone] and ask for the dermatology resident on call to be paged  If this is a medical emergency and you are unable to reach an ER, Call [phone]      If you need a prescription refill, please contact your pharmacy. Refills are approved or denied by our Physicians during normal business hours, Monday through Fridays  Per office policy, refills will not be granted if you have not been seen within the past year (or sooner depending on your child's condition)

## 2024-10-22 NOTE — LETTER
10/22/2024      Isacc Wang Jr.  [address]  [facility] [address]      Dear Colleague,    Thank you for referring your patient, Isacc Wang Jr., to the [facility]. Please see a copy of my visit note below.    HPI:   Chief complaints: Isacc Wang Jr. is a pleasant 44 year old male who presents for evaluation of three lumps beneath the skin. He has had all the areas for a while but they are starting to become tender      PHYSICAL EXAM:    There were no vitals taken for this visit.  Skin exam performed as follows: Type 5 skin. Mood appropriate  Alert and Oriented X 3. Well developed, well nourished in no distress.  General appearance: Normal  Head including face: Normal  Eyes: conjunctiva and lids: Normal  Mouth: Lips, teeth, gums: Normal  Neck: Normal  Skin: Scalp and body hair: See below    Right forearm with 2.5 cm smooth rubbery subcutaneous nodule  Right triceps with 2.5 cm smooth rubbery subcutaneous nodule  Right posterior thigh with 7 x 5 cm smooth rubbery subcutaneous nodule    ASSESSMENT/PLAN:     Multiple lipomas - all becoming bothersome. He will schedule for excision of these.           Follow-up: PRN  CC:   Scribed By: Tara Thurston, MS, PATANYA      Again, thank you for allowing me to participate in the care of your patient.        Sincerely,        Tara Thurston PA-C

## 2024-11-27 ENCOUNTER — OFFICE VISIT (OUTPATIENT)
Dept: DERMATOLOGY | Facility: CLINIC | Age: 44
End: 2024-11-27
Payer: COMMERCIAL

## 2024-11-27 DIAGNOSIS — D17.20 LIPOMA OF FOREARM: ICD-10-CM

## 2024-11-27 NOTE — LETTER
11/27/2024      Isacc Wang Jr.  [address]  [facility] [address]      Dear Colleague,    Thank you for referring your patient, Isacc Wang Jr., to the [facility]. Please see a copy of my visit note below.    DERMATOLOGIC SURGERY REPORT    NAME OF PROCEDURE:  EXCISION AND CLOSURE    Surgeon:  Manuel Owens MD    PREOPERATIVE DIAGNOSIS: Lipoma  POSTOPERATIVE DIAGNOSIS: Same  Lesion Size: 22 mm lesion with 0 mm margins  Final excision size: 22 mm  Repair type: Complex  FINAL REPAIR LENGTH:  30 mm  Location: R forearm   Prior Biopsy Accession #: [id number]    INDICATIONS:Excision was indicated for treatment. We discussed the principles of treatment and most likely complications including bleeding, infection, wound dehiscence, pain, nerve damage, and scarring. Informed consent was obtained and the patient underwent the procedure as follows.    PROCEDURE:  The patient was taken to the operative suite. The treatment area was anesthetized with 1% lidocaine with [id number] epinephrine buffered with bicarbonate. The area was washed with hibiclens, rinsed with saline and draped with sterile towels. The lesion was delineated and excised down to subcutaneous fat. Hemostasis was obtained by electrocoagulation.     REPAIR:  In order to repair this defect while maintaining the normal anatomic relations and function, we elected to utilize a linear closure. Closure was oriented so that the wound was in the patient's natural skin tension lines. Two redundant cones were removed by triangulation. Due to tightness of the surrounding skin deeper layers of the subcutaneous tissue were undermined extensively to a distance  greater than width of the defect on both sides by dissection in the subcutaneous plane until adequate tissue mobility was obtained. Deep dermal and subcutaneous layer closure was performed using 4-0 monocryl deep, intradermal and subcutaneous sutures.  Final cutaneous  approximation was achieved with 4-0 running subcuticular monocryl sutures.      A total of 5 mL of anesthesia was administered for all surgical sites. Estimated blood loss was less than 5 mL for all surgical sites. A sterile pressure dressing was applied and wound care instructions, with a written handout, were given. The patient was discharged alert and ambulatory.    Manuel Owens M.D.      Department of Dermatology       Again, thank you for allowing me to participate in the care of your patient.        Sincerely,        Manuel Owens MD

## 2024-11-27 NOTE — PROGRESS NOTES
DERMATOLOGIC SURGERY REPORT    NAME OF PROCEDURE:  EXCISION AND CLOSURE    Surgeon:  Manuel Owens MD    PREOPERATIVE DIAGNOSIS: Lipoma  POSTOPERATIVE DIAGNOSIS: Same  Lesion Size: 22 mm lesion with 0 mm margins  Final excision size: 22 mm  Repair type: Complex  FINAL REPAIR LENGTH:  30 mm  Location: R forearm   Prior Biopsy Accession #: [id number]    INDICATIONS:Excision was indicated for treatment. We discussed the principles of treatment and most likely complications including bleeding, infection, wound dehiscence, pain, nerve damage, and scarring. Informed consent was obtained and the patient underwent the procedure as follows.    PROCEDURE:  The patient was taken to the operative suite. The treatment area was anesthetized with 1% lidocaine with 1:[id number] epinephrine buffered with bicarbonate. The area was washed with hibiclens, rinsed with saline and draped with sterile towels. The lesion was delineated and excised down to subcutaneous fat. Hemostasis was obtained by electrocoagulation.     REPAIR:  In order to repair this defect while maintaining the normal anatomic relations and function, we elected to utilize a linear closure. Closure was oriented so that the wound was in the patient's natural skin tension lines. Two redundant cones were removed by triangulation. Due to tightness of the surrounding skin deeper layers of the subcutaneous tissue were undermined extensively to a distance  greater than width of the defect on both sides by dissection in the subcutaneous plane until adequate tissue mobility was obtained. Deep dermal and subcutaneous layer closure was performed using 4-0 monocryl deep, intradermal and subcutaneous sutures.  Final cutaneous approximation was achieved with 4-0 running subcuticular monocryl sutures.      A total of 5 mL of anesthesia was administered for all surgical sites. Estimated blood loss was less than 5 mL for all surgical sites. A sterile pressure dressing was applied and wound care  instructions, with a written handout, were given. The patient was discharged alert and ambulatory.    Manuel Owens M.D.      Department of Dermatology

## 2024-11-27 NOTE — PATIENT INSTRUCTIONS
Excision Wound Care Instructions  I will experience scar, altered skin color, bleeding, swelling, pain, crusting and redness. I may experience altered sensation. Risks are excessive bleeding, infection, muscle weakness, thick (hypertrophic or keloidal) scar, and recurrence. A second procedure may be recommended to obtain the best cosmetic or functional result.  Possible complications of any surgical procedure are bleeding, infection, scarring, alteration in skin color and sensation, muscle weakness in the area, wound dehiscence or seperation, or recurrence of the lesion or disease. On occasion, after healing, a secondary procedure or revision may be recommended in order to obtain the best cosmetic or functional result.   After your surgery, a pressure bandage will be placed over the area that has sutures. This will help prevent bleeding. Please follow these instructions, as they will help you to prevent complications as your wound heals.  For the First 24 hours After Surgery:  Leave the pressure bandage on and keep it dry. If it should come loose, you may retape it, but do not take it off.  Relax and take it easy. Do not do any vigorous exercise, heavy lifting, or bending forward. This could cause the wound to bleed.  Post-operative pain is usually mild. You may alternate between 1000 mg of Tylenol (acetaminophen) and 400 mg of Ibuprofen every 4 hours.  Do not take more than 4,000mg of acetaminophen in a 24 hour period or 3200 mg of Ibuprofen in a 24 hr period.  Avoid alcohol and vitamin E as these may increase your tendency to bleed.  You may put an ice pack around the bandaged area for 20 minutes every 2-3 hours. This may help reduce swelling, bruising, and pain. Make sure the ice pack is waterproof so that the pressure bandage does not get wet.   You may see a small amount of drainage or blood on your pressure bandage. This is normal. However, if drainage or bleeding continues or saturates the bandage, you will  need to apply firm pressure over the bandage with a washcloth for 15 minutes. If bleeding continues after applying pressure for 15 minutes then go to the nearest emergency room.  After the first 24 hours from Surgery  Carefully remove the bandage and start daily wound care and dressing changes. You may also now shower and get the wound wet.  Daily Wound Care:  Wash wound with a mild soap and water.  Use caution when washing the wound, be gentle and do not let the forceful shower stream hit the wound directly.  Pat dry.  Apply Vaseline (from a new container or tube) over the suture line with a Q-tip. It is very important to keep the wound continuously moist, as wounds heal best in a moist environment.  If you had stitches placed keep the site covered until sutures have either been removed or dissolve.  You can cover it with a Telfa (non-stick) dressing and tape or a band-aid.    If you are unable to keep wound covered, you must apply Vaseline every 2-3 hours (while awake) to ensure it is being kept moist for optimal healing. A dressing overnight is recommended to keep the area moist.  Call Us If:  You have pain that is not controlled with Tylenol/Ibuprofen  You have signs or symptoms of an infection, such as: fever over 100 degrees F, redness, warmth, or foul-smelling or yellow drainage from the wound.  Who should I call with questions?  [facility]: [phone]   [facility]: [phone]  [facility]: [phone]  For urgent needs outside of business hours call the [facility] at [phone] and ask to speak with the dermatology resident on call

## 2024-12-03 LAB
PATH REPORT.COMMENTS IMP SPEC: NORMAL
PATH REPORT.COMMENTS IMP SPEC: NORMAL
PATH REPORT.FINAL DX SPEC: NORMAL
PATH REPORT.GROSS SPEC: NORMAL
PATH REPORT.MICROSCOPIC SPEC OTHER STN: NORMAL
PATH REPORT.RELEVANT HX SPEC: NORMAL

## 2024-12-04 ENCOUNTER — OFFICE VISIT (OUTPATIENT)
Dept: DERMATOLOGY | Facility: CLINIC | Age: 44
End: 2024-12-04
Payer: COMMERCIAL

## 2024-12-04 DIAGNOSIS — D48.9 NEOPLASM OF UNCERTAIN BEHAVIOR: ICD-10-CM

## 2024-12-04 NOTE — PROGRESS NOTES
DERMATOLOGIC SURGERY REPORT    NAME OF PROCEDURE:  EXCISION AND CLOSURE    Surgeon:  Manuel Owens MD    PREOPERATIVE DIAGNOSIS: NUB  POSTOPERATIVE DIAGNOSIS: Same  Lesion Size: 55 mm lesion with 0 mm margins  Final excision size: 55 mm  Repair type: Complex   FINAL REPAIR LENGTH:  86 mm  Location: R thigh   Prior Biopsy Accession #: [id number]    INDICATIONS:Excision was indicated for treatment. We discussed the principles of treatment and most likely complications including bleeding, infection, wound dehiscence, pain, nerve damage, and scarring. Informed consent was obtained and the patient underwent the procedure as follows.    PROCEDURE:  The patient was taken to the operative suite. The treatment area was anesthetized with 1% lidocaine with 1:[id number] epinephrine buffered with bicarbonate. The area was washed with hibiclens, rinsed with saline and draped with sterile towels. The lesion was delineated and excised down to subcutaneous fat. Hemostasis was obtained by electrocoagulation.     REPAIR:  In order to repair this defect while maintaining the normal anatomic relations and function, we elected to utilize a linear closure. Closure was oriented so that the wound was in the patient's natural skin tension lines. Two redundant cones were removed by triangulation. Due to tightness of the surrounding skin deeper layers of the subcutaneous tissue were undermined extensively to a distance  greater than width of the defect on both sides by dissection in the subcutaneous plane until adequate tissue mobility was obtained. Deep dermal and subcutaneous layer closure was performed using 3-0 monocryl deep, intradermal and subcutaneous sutures.  Final cutaneous approximation was achieved with 3-0 monocryl running horizontal mattress sutures.      A total of 5 mL of anesthesia was administered for all surgical sites. Estimated blood loss was less than 5 mL for all surgical sites. A sterile pressure dressing was applied and wound care  instructions, with a written handout, were given. The patient was discharged alert and ambulatory.    Manuel Owens M.D.      Department of Dermatology

## 2024-12-04 NOTE — PATIENT INSTRUCTIONS
Excision Wound Care Instructions  I will experience scar, altered skin color, bleeding, swelling, pain, crusting and redness. I may experience altered sensation. Risks are excessive bleeding, infection, muscle weakness, thick (hypertrophic or keloidal) scar, and recurrence. A second procedure may be recommended to obtain the best cosmetic or functional result.  Possible complications of any surgical procedure are bleeding, infection, scarring, alteration in skin color and sensation, muscle weakness in the area, wound dehiscence or seperation, or recurrence of the lesion or disease. On occasion, after healing, a secondary procedure or revision may be recommended in order to obtain the best cosmetic or functional result.   After your surgery, a pressure bandage will be placed over the area that has sutures. This will help prevent bleeding. Please follow these instructions until you come back to clinic for suture removal on day 7, as they will help you to prevent complications as your wound heals.  For the First 24 hours After Surgery:  Leave the pressure bandage on and keep it dry. If it should come loose, you may retape it, but do not take it off.  Relax and take it easy. Do not do any vigorous exercise, heavy lifting, or bending forward. This could cause the wound to bleed.  Post-operative pain is usually mild. You may alternate between 1000 mg of Tylenol (acetaminophen) and 400 mg of Ibuprofen every 4 hours.  Do not take more than 4,000mg of acetaminophen in a 24 hour period or 3200 mg of Ibuprofen in a 24 hr period.  Avoid alcohol and vitamin E as these may increase your tendency to bleed.  You may put an ice pack around the bandaged area for 20 minutes every 2-3 hours. This may help reduce swelling, bruising, and pain. Make sure the ice pack is waterproof so that the pressure bandage does not get wet.   You may see a small amount of drainage or blood on your pressure bandage. This is normal. However, if drainage  or bleeding continues or saturates the bandage, you will need to apply firm pressure over the bandage with a washcloth for 15 minutes. If bleeding continues after applying pressure for 15 minutes then go to the nearest emergency room.  After the first 24 hours from Surgery  Carefully remove the bandage and start daily wound care and dressing changes. You may also now shower and get the wound wet.  Daily Wound Care:  Wash wound with a mild soap and water.  Use caution when washing the wound, be gentle and do not let the forceful shower stream hit the wound directly.  Pat dry.  Apply Vaseline (from a new container or tube) over the suture line with a Q-tip. It is very important to keep the wound continuously moist, as wounds heal best in a moist environment.  If you had stitches placed keep the site covered until sutures have either been removed or dissolve.  You can cover it with a Telfa (non-stick) dressing and tape or a band-aid.    If you are unable to keep wound covered, you must apply Vaseline every 2-3 hours (while awake) to ensure it is being kept moist for optimal healing. A dressing overnight is recommended to keep the area moist.  Call Us If:  You have pain that is not controlled with Tylenol/Ibuprofen  You have signs or symptoms of an infection, such as: fever over 100 degrees F, redness, warmth, or foul-smelling or yellow drainage from the wound.  Who should I call with questions?  [facility]: [phone]   [facility]: [phone]  [facility]: [phone]  For urgent needs outside of business hours call the [facility] at [phone] and ask to speak with the dermatology resident on call

## 2024-12-04 NOTE — LETTER
12/4/2024      Isacc Wang Jr.  [address]  [facility] [address]      Dear Colleague,    Thank you for referring your patient, Isacc Wang Jr., to the [facility]. Please see a copy of my visit note below.    DERMATOLOGIC SURGERY REPORT    NAME OF PROCEDURE:  EXCISION AND CLOSURE    Surgeon:  Manuel Owens MD    PREOPERATIVE DIAGNOSIS: NUB  POSTOPERATIVE DIAGNOSIS: Same  Lesion Size: 55 mm lesion with 0 mm margins  Final excision size: 55 mm  Repair type: Complex   FINAL REPAIR LENGTH:  86 mm  Location: R thigh   Prior Biopsy Accession #: [id number]    INDICATIONS:Excision was indicated for treatment. We discussed the principles of treatment and most likely complications including bleeding, infection, wound dehiscence, pain, nerve damage, and scarring. Informed consent was obtained and the patient underwent the procedure as follows.    PROCEDURE:  The patient was taken to the operative suite. The treatment area was anesthetized with 1% lidocaine with [id number] epinephrine buffered with bicarbonate. The area was washed with hibiclens, rinsed with saline and draped with sterile towels. The lesion was delineated and excised down to subcutaneous fat. Hemostasis was obtained by electrocoagulation.     REPAIR:  In order to repair this defect while maintaining the normal anatomic relations and function, we elected to utilize a linear closure. Closure was oriented so that the wound was in the patient's natural skin tension lines. Two redundant cones were removed by triangulation. Due to tightness of the surrounding skin deeper layers of the subcutaneous tissue were undermined extensively to a distance  greater than width of the defect on both sides by dissection in the subcutaneous plane until adequate tissue mobility was obtained. Deep dermal and subcutaneous layer closure was performed using 3-0 monocryl deep, intradermal and subcutaneous sutures.  Final cutaneous  approximation was achieved with 3-0 monocryl running horizontal mattress sutures.      A total of 5 mL of anesthesia was administered for all surgical sites. Estimated blood loss was less than 5 mL for all surgical sites. A sterile pressure dressing was applied and wound care instructions, with a written handout, were given. The patient was discharged alert and ambulatory.    Manuel Owens M.D.      Department of Dermatology       Again, thank you for allowing me to participate in the care of your patient.        Sincerely,        Manuel Owens MD

## 2024-12-09 ENCOUNTER — ALLIED HEALTH/NURSE VISIT (OUTPATIENT)
Dept: DERMATOLOGY | Facility: CLINIC | Age: 44
End: 2024-12-09
Payer: COMMERCIAL

## 2024-12-09 DIAGNOSIS — Z48.89 ENCOUNTER FOR POST SURGICAL WOUND CHECK: Primary | ICD-10-CM

## 2024-12-09 PROCEDURE — 99207 PR NO CHARGE NURSE ONLY: CPT | Performed by: STUDENT IN AN ORGANIZED HEALTH CARE EDUCATION/TRAINING PROGRAM

## 2024-12-09 NOTE — PROGRESS NOTES
Isacc Wang Jr. comes into clinic today at the request of Dr. Owens Ordering Provider for Wound Check Action taken: wound healing well, pt able to return to work .      This service provided today was under the supervising provider of the day Dr. Owens, who was available if needed.    Dai Alvarenga RN

## 2024-12-11 ENCOUNTER — ALLIED HEALTH/NURSE VISIT (OUTPATIENT)
Dept: DERMATOLOGY | Facility: CLINIC | Age: 44
End: 2024-12-11
Payer: COMMERCIAL

## 2024-12-11 DIAGNOSIS — Z48.02 VISIT FOR SUTURE REMOVAL: Primary | ICD-10-CM

## 2024-12-11 PROCEDURE — 99207 PR NO CHARGE NURSE ONLY: CPT | Performed by: STUDENT IN AN ORGANIZED HEALTH CARE EDUCATION/TRAINING PROGRAM

## 2024-12-11 NOTE — PROGRESS NOTES
Isacc Wang Jr. comes into clinic today at the request of Dr. Owens Ordering Provider for Suture Removal:  Incision was dry, clean and intact, incision cleansed with wound cleanser and sutures were removed. Pt tolerated the procedure.     This service provided today was under the supervising provider of the murali Herman, who was available if needed.    Dai Alvarenga RN

## 2025-01-08 ENCOUNTER — OFFICE VISIT (OUTPATIENT)
Dept: DERMATOLOGY | Facility: CLINIC | Age: 45
End: 2025-01-08
Payer: COMMERCIAL

## 2025-01-08 DIAGNOSIS — D17.21 LIPOMA OF RIGHT UPPER EXTREMITY: ICD-10-CM

## 2025-01-08 DIAGNOSIS — D17.20 LIPOMA OF LOWER EXTREMITY, UNSPECIFIED LATERALITY: Primary | ICD-10-CM

## 2025-01-08 NOTE — PROGRESS NOTES
DERMATOLOGIC SURGERY REPORT    NAME OF PROCEDURE:  EXCISION AND CLOSURE    Surgeon:  Manuel Owens MD    PREOPERATIVE DIAGNOSIS: Lipoma  POSTOPERATIVE DIAGNOSIS: Same  Lesion Size: 31 mm lesion with 0mm margins  Final excision size: 31 mm  Repair type: intermediate  FINAL REPAIR LENGTH:  36mm  Location: R upper arm  Prior Biopsy Accession #: [id number]    INDICATIONS:Excision was indicated for treatment. We discussed the principles of treatment and most likely complications including bleeding, infection, wound dehiscence, pain, nerve damage, and scarring. Informed consent was obtained and the patient underwent the procedure as follows.    PROCEDURE:  The patient was taken to the operative suite. The treatment area was anesthetized with 1% lidocaine with [id number] epinephrine buffered with bicarbonate. The area was washed with hibiclens, rinsed with saline and draped with sterile towels. The lesion was delineated and excised down to subcutaneous fat. Hemostasis was obtained by electrocoagulation.     REPAIR:  In order to repair this defect while maintaining the normal anatomic relations and function, we elected to utilize a linear closure. Closure was oriented so that the wound was in the patient's natural skin tension lines. Two redundant cones were removed by triangulation. Due to tightness of the surrounding skin deeper layers of the subcutaneous tissue were undermined extensively to a distance  greater than width of the defect on both sides by dissection in the subcutaneous plane until adequate tissue mobility was obtained. Deep dermal and subcutaneous layer closure was performed using 3-0 monocryl deep, intradermal and subcutaneous sutures.  Final cutaneous approximation was achieved with 3-0 monocryl running subcuticular sutures.      A total of 5 mL of anesthesia was administered for all surgical sites. Estimated blood loss was less than 5 mL for all surgical sites. A sterile pressure dressing was applied and wound  care instructions, with a written handout, were given. The patient was discharged alert and ambulatory.    Manuel Owens M.D.      Department of Dermatology

## 2025-02-03 DIAGNOSIS — K21.9 GASTROESOPHAGEAL REFLUX DISEASE WITHOUT ESOPHAGITIS: ICD-10-CM

## 2025-02-03 RX ORDER — OMEPRAZOLE 20 MG/1
20 CAPSULE, DELAYED RELEASE ORAL DAILY
Qty: 90 CAPSULE | Refills: 0 | Status: SHIPPED | OUTPATIENT
Start: 2025-02-03

## (undated) DEVICE — KIT ENDO TURNOVER/PROCEDURE W/CLEAN A SCOPE LINERS 103888

## (undated) DEVICE — KIT ENDO TURNOVER/PROCEDURE CARRY-ON 101822

## (undated) DEVICE — SOL WATER IRRIG 1000ML BOTTLE 07139-09

## (undated) DEVICE — SYR 30ML SLIP TIP W/O NDL 302833

## (undated) DEVICE — ENDO BITE BLOCK ADULT OMNI-BLOC

## (undated) DEVICE — TUBING SUCTION 12"X1/4" N612

## (undated) DEVICE — GOWN IMPERVIOUS 2XL BLUE

## (undated) DEVICE — SUCTION MANIFOLD NEPTUNE 2 SYS 1 PORT 702-025-000

## (undated) DEVICE — GLOVE EXAM NITRILE LG PF LATEX FREE 5064

## (undated) DEVICE — SOL WATER IRRIG 500ML BOTTLE 2F7113

## (undated) RX ORDER — FENTANYL CITRATE 50 UG/ML
INJECTION, SOLUTION INTRAMUSCULAR; INTRAVENOUS
Status: DISPENSED
Start: 2017-10-26

## (undated) RX ORDER — SIMETHICONE 40MG/0.6ML
SUSPENSION, DROPS(FINAL DOSAGE FORM)(ML) ORAL
Status: DISPENSED
Start: 2023-06-16

## (undated) RX ORDER — FENTANYL CITRATE 50 UG/ML
INJECTION, SOLUTION INTRAMUSCULAR; INTRAVENOUS
Status: DISPENSED
Start: 2023-06-15

## (undated) RX ORDER — SIMETHICONE 40MG/0.6ML
SUSPENSION, DROPS(FINAL DOSAGE FORM)(ML) ORAL
Status: DISPENSED
Start: 2023-06-15

## (undated) RX ORDER — FENTANYL CITRATE 50 UG/ML
INJECTION, SOLUTION INTRAMUSCULAR; INTRAVENOUS
Status: DISPENSED
Start: 2023-06-16

## (undated) RX ORDER — LIDOCAINE HYDROCHLORIDE 20 MG/ML
SOLUTION OROPHARYNGEAL
Status: DISPENSED
Start: 2022-06-10

## (undated) RX ORDER — DIPHENHYDRAMINE HYDROCHLORIDE 50 MG/ML
INJECTION INTRAMUSCULAR; INTRAVENOUS
Status: DISPENSED
Start: 2020-09-21

## (undated) RX ORDER — FENTANYL CITRATE 50 UG/ML
INJECTION, SOLUTION INTRAMUSCULAR; INTRAVENOUS
Status: DISPENSED
Start: 2020-09-21